# Patient Record
Sex: FEMALE | Race: WHITE | NOT HISPANIC OR LATINO | Employment: OTHER | URBAN - METROPOLITAN AREA
[De-identification: names, ages, dates, MRNs, and addresses within clinical notes are randomized per-mention and may not be internally consistent; named-entity substitution may affect disease eponyms.]

---

## 2017-11-06 ENCOUNTER — OFFICE VISIT (OUTPATIENT)
Dept: MEDICAL GROUP | Facility: PHYSICIAN GROUP | Age: 67
End: 2017-11-06
Payer: MEDICARE

## 2017-11-06 VITALS
WEIGHT: 143.08 LBS | OXYGEN SATURATION: 97 % | SYSTOLIC BLOOD PRESSURE: 124 MMHG | HEIGHT: 66 IN | TEMPERATURE: 98.4 F | HEART RATE: 58 BPM | BODY MASS INDEX: 22.99 KG/M2 | DIASTOLIC BLOOD PRESSURE: 80 MMHG

## 2017-11-06 DIAGNOSIS — Z79.891 CHRONIC USE OF OPIATE FOR THERAPEUTIC PURPOSE: ICD-10-CM

## 2017-11-06 DIAGNOSIS — G89.29 CHRONIC BILATERAL LOW BACK PAIN WITH BILATERAL SCIATICA: ICD-10-CM

## 2017-11-06 DIAGNOSIS — E89.0 POSTSURGICAL HYPOTHYROIDISM: ICD-10-CM

## 2017-11-06 DIAGNOSIS — M54.42 CHRONIC BILATERAL LOW BACK PAIN WITH BILATERAL SCIATICA: ICD-10-CM

## 2017-11-06 DIAGNOSIS — I47.10 PSVT (PAROXYSMAL SUPRAVENTRICULAR TACHYCARDIA): ICD-10-CM

## 2017-11-06 DIAGNOSIS — I49.1 PAC (PREMATURE ATRIAL CONTRACTION): ICD-10-CM

## 2017-11-06 DIAGNOSIS — F11.20 OPIATE DEPENDENCE, CONTINUOUS (HCC): ICD-10-CM

## 2017-11-06 DIAGNOSIS — Z85.850 HISTORY OF THYROID CANCER: ICD-10-CM

## 2017-11-06 DIAGNOSIS — M54.41 CHRONIC BILATERAL LOW BACK PAIN WITH BILATERAL SCIATICA: ICD-10-CM

## 2017-11-06 DIAGNOSIS — Z23 NEED FOR IMMUNIZATION AGAINST INFLUENZA: ICD-10-CM

## 2017-11-06 DIAGNOSIS — K21.9 GASTROESOPHAGEAL REFLUX DISEASE, ESOPHAGITIS PRESENCE NOT SPECIFIED: ICD-10-CM

## 2017-11-06 PROCEDURE — 99000 SPECIMEN HANDLING OFFICE-LAB: CPT | Performed by: FAMILY MEDICINE

## 2017-11-06 PROCEDURE — 99204 OFFICE O/P NEW MOD 45 MIN: CPT | Mod: 25 | Performed by: FAMILY MEDICINE

## 2017-11-06 PROCEDURE — G0008 ADMIN INFLUENZA VIRUS VAC: HCPCS | Performed by: FAMILY MEDICINE

## 2017-11-06 PROCEDURE — 90662 IIV NO PRSV INCREASED AG IM: CPT | Performed by: FAMILY MEDICINE

## 2017-11-06 RX ORDER — ATENOLOL 25 MG/1
25 TABLET ORAL DAILY
COMMUNITY
End: 2018-02-07

## 2017-11-06 RX ORDER — ASPIRIN 81 MG/1
81 TABLET, CHEWABLE ORAL
COMMUNITY
End: 2023-04-03

## 2017-11-06 RX ORDER — MULTIVIT,IRON,MINERALS/LUTEIN
1 TABLET ORAL DAILY
COMMUNITY
End: 2023-01-05

## 2017-11-06 RX ORDER — HYDROCODONE BITARTRATE AND ACETAMINOPHEN 5; 325 MG/1; MG/1
1 TABLET ORAL EVERY 8 HOURS PRN
Qty: 100 TAB | Refills: 0 | Status: SHIPPED | OUTPATIENT
Start: 2017-11-06 | End: 2018-02-06 | Stop reason: SDUPTHER

## 2017-11-06 RX ORDER — LEVOTHYROXINE SODIUM 88 UG/1
88 TABLET ORAL
COMMUNITY
End: 2017-11-27 | Stop reason: SDUPTHER

## 2017-11-06 RX ORDER — TRAZODONE HYDROCHLORIDE 50 MG/1
50 TABLET ORAL NIGHTLY
COMMUNITY
End: 2019-01-21

## 2017-11-06 RX ORDER — METOPROLOL SUCCINATE 25 MG/1
25 TABLET, EXTENDED RELEASE ORAL DAILY
COMMUNITY
End: 2017-11-27 | Stop reason: SDUPTHER

## 2017-11-06 RX ORDER — LANOLIN ALCOHOL/MO/W.PET/CERES
1000 CREAM (GRAM) TOPICAL DAILY
COMMUNITY
End: 2023-04-03

## 2017-11-06 RX ORDER — OMEPRAZOLE 40 MG/1
40 CAPSULE, DELAYED RELEASE ORAL DAILY
COMMUNITY
End: 2018-08-06 | Stop reason: SDUPTHER

## 2017-11-06 RX ORDER — HYDROCODONE BITARTRATE AND ACETAMINOPHEN 5; 325 MG/1; MG/1
1-2 TABLET ORAL EVERY 4 HOURS PRN
COMMUNITY
End: 2017-11-06 | Stop reason: SDUPTHER

## 2017-11-06 ASSESSMENT — ENCOUNTER SYMPTOMS: DEPRESSION: 0

## 2017-11-06 ASSESSMENT — LIFESTYLE VARIABLES: HISTORY_ALCOHOL_USE: 0

## 2017-11-06 ASSESSMENT — PATIENT HEALTH QUESTIONNAIRE - PHQ9: CLINICAL INTERPRETATION OF PHQ2 SCORE: 0

## 2017-11-06 NOTE — LETTER
[facility]  Cheryl Pineda M.D.  [address]  [place] [address]  Fax: [phone]   Authorization for Release/Disclosure of   Protected Health Information   Name: KRISSY JIMENEZ : 1950 SSN: [id number]   Address: [address] [place] [address] Phone:    [phone] (home)    I authorize the entity listed below to release/disclose the PHI below to:   [facility]/Cheryl Pineda M.D. and Cheryl Pineda M.D.   Provider or Entity Name:  [facility], [place], [place] Phone:      Fax:     Reason for request: continuity of care   Information to be released:    [  ] LAST COLONOSCOPY,  including any PATH REPORT and follow-up  [  ] LAST FIT/COLOGUARD RESULT [  ] LAST DEXA  [  ] LAST MAMMOGRAM  [  ] LAST PAP  [  ] LAST LABS [  ] RETINA EXAM REPORT  [  ] IMMUNIZATION RECORDS  [  ] Release all info      [  ] Check here and initial the line next to each item to release ALL health information INCLUDING  _____ Care and treatment for drug and / or alcohol abuse  _____ HIV testing, infection status, or AIDS  _____ Genetic Testing    DATES OF SERVICE OR TIME PERIOD TO BE DISCLOSED: _____________  I understand and acknowledge that:  * This Authorization may be revoked at any time by you in writing, except if your health information has already been used or disclosed.  * Your health information that will be used or disclosed as a result of you signing this authorization could be re-disclosed by the recipient. If this occurs, your re-disclosed health information may no longer be protected by State or Federal laws.  * You may refuse to sign this Authorization. Your refusal will not affect your ability to obtain treatment.  * This Authorization becomes effective upon signing and will  on (date) __________.      If no date is indicated, this Authorization will  one (1) year from the signature date.    Name: Krissy Jimenez    Signature:   Date:     2017       PLEASE  FAX REQUESTED RECORDS BACK TO: [phone]

## 2017-11-07 NOTE — PROGRESS NOTES
Subjective:      Argelia Jimenez is a 67 y.o. female who presents with Establish Care and Immunizations (flu HD)            HPI     This is a 67-year-old white female who is here as a new patient to get established. She most from the [place], [place] in July 2017.    She takes metoprolol XL for rapid heartbeat and  PACs. She said she was previously on atenolol which was switched to weeks ago to metoprolol due to shortage of the medication. Since she moved to the area, she has noticed shortness of breath and more palpitations which she attributes to the altitude. They are lasting longer than what they used to be from 20 seconds to about 3-5 minutes. She denies feeling faint or having presyncope or syncopal episode due to the rapid heartbeat. She said he can go up as high as 180 bpm.    She has history of thyroid cancer and underwent initially a left thyroid lobectomy and then later on she had right thyroid lobectomy to complete the total thyroidectomy in the 1990s. She said she has been cancer free. She takes thyroid replacement levothyroxine 88 µg 6 days a week. She said her last TSH was in June 2017.    She also has GERD for which he takes omeprazole 40 mg daily. She has been on this dose for a while and has not tried a lower dose or on as needed basis only.    She takes trazodone for insomnia 50 mg 2 times a week with good results.    She suffers from chronic low back pain with radiation to the lower extremities worse on the right than the left. She said prior MRI showed stenosis. She has tried ibuprofen in the past which has not helped. Her previous PCP in [place] put her on hydrocodone/APAP 5/325 which she takes 1-3 tablets a day and sometimes she doesn't take it at all in one day. #100 tablets last her 3 months. She said her last refill was in July 2017 and so she needs one today.    Chronic pain recheck:   Last dose of controlled substance: 3-4 days ago  Chronic pain treated with hydrocodone/APAP 5/325  one to 3 tablets a day as needed and sometimes she doesn't take it at all for one day     She  reports that she drinks alcohol.  She  reports that she does not use drugs.    Consequences of Chronic Opiate therapy:  (5 A's)  Analgesia: Compared to no treatment or prior treatment, pain is currently not changed  Activity: not changed  Adverse Events: She denies constipation, dry mouth, itchy skin, nausea and sedation  Aberrant Behaviors: She reports she is taking medication as prescribed and is not veering from agreed treatment regimen. There have been no inappropriate refills or lost/stolen meds reported.   Affect/Mood: Pain is not impacting patient's mood.  Patient reports anxiety.    Nonnarcotic treatments that are being used: NSAIDs/RICK-2-she has tried ibuprofen which did not work.   Treatment goals discussed.    Opioid Risk Score: 1    Interpretation of Opioid Risk Score   Score 0-3 = Low risk of abuse. Do UDS at least once per year.  Score 4-7 = Moderate risk of abuse. Do UDS 1-4 times per year.  Score 8+ = High risk of abuse. Refer to specialist.    Last order of CONTROLLED SUBSTANCE TREATMENT AGREEMENT was found on 11/6/2017 from Office Visit on 11/6/2017     UDS Summary                URINE DRUG SCREEN Overdue 1950         No prior UDS.    I have reviewed the medical records, the Prescription Monitoring Program and I have determined that controlled substance treatment is medically indicated.    Her last mammogram was this year and she said it came back normal. She said she does her mammogram yearly because her mother had breast cancer in her 60s. She also does her colonoscopy regularly every 5 years because of brother had colon cancer in his 40s. The last mammogram was last year and was normal. All her mammograms have been normal. Per patient she already had Prevnar 13 and Pneumovax 23 as well as tdap and shingles from her previous physician.    I reviewed the following    Past Medical History:    Diagnosis Date   • Chronic bilateral low back pain with bilateral sciatica    • Chronic use of opiate for therapeutic purpose    • GERD (gastroesophageal reflux disease)    • History of thyroid cancer    • Insomnia    • PAC (premature atrial contraction)    • Postsurgical hypothyroidism    • PSVT (paroxysmal supraventricular tachycardia) (CMS-[facility])         Past Surgical History:   Procedure Laterality Date   • APPENDECTOMY     • OTHER Left     ruptured ovarian cyst   • THYROIDECTOMY Left    • THYROIDECTOMY Right    • TONSILLECTOMY         Not on File    Current Outpatient Prescriptions   Medication Sig Dispense Refill   • omeprazole (PRILOSEC) 40 MG delayed-release capsule Take 40 mg by mouth every day.     • metoprolol SR (TOPROL XL) 25 MG TABLET SR 24 HR Take 25 mg by mouth every day.     • atenolol (TENORMIN) 25 MG Tab Take 25 mg by mouth every day.     • levothyroxine (SYNTHROID) 88 MCG Tab Take 88 mcg by mouth Every morning on an empty stomach. Takes 6 days a week     • trazodone (DESYREL) 50 MG Tab Take 50 mg by mouth every evening.     • Cyanocobalamin (VITAMIN B-12) 1000 MCG Tab Take 1,000 mcg by mouth every day.     • Omega-3 Fatty Acids (FISH OIL PO) Take  by mouth.     • Multiple Minerals-Vitamins (CALCIUM-MAGNESIUM-ZINC-D3) Tab Take  by mouth.     • BIOTIN PO Take  by mouth.     • CRANBERRY EXTRACT PO Take  by mouth.     • aspirin (ASA) 81 MG Chew Tab chewable tablet Take 81 mg by mouth every day.     • hydrocodone-acetaminophen (NORCO) 5-325 MG Tab per tablet Take 1 Tab by mouth every 8 hours as needed. 100 Tab 0     No current facility-administered medications for this visit.         Family History   Problem Relation Age of Onset   • Cancer Mother 60     breast cancer   • Heart Disease Sister      rapid heartbeat   • Cancer Brother      colon cancer   • Alcohol/Drug Brother      alcoholism   • No Known Problems Brother        Social History     Social History   • Marital status:      Spouse  "name: N/A   • Number of children: N/A   • Years of education: N/A     Occupational History   • retired dental assistant      Social History Main Topics   • Smoking status: Never Smoker   • Smokeless tobacco: Never Used   • Alcohol use Yes      Comment: 2 beers, 2-3 vodka/week   • Drug use: No   • Sexual activity: Yes     Partners: Male      Comment: 2 childen  (son and daughter)     Other Topics Concern   • Not on file     Social History Narrative   • No narrative on file          ROS     Review of Systems  Constitutional: Negative for fever, chills, weight loss and malaise/fatigue.   HEENT: Negative for ear pain, nosebleeds, congestion, sore throat and neck pain   Eyes: Negative for blurred vision.   Respiratory: Negative for cough, sputum production, shortness of breath and wheezing.    Cardiovascular: Negative for chest pain, palpitations, orthopnea and leg swelling.   Gastrointestinal: Negative for heartburn, nausea, vomiting and abdominal pain.   Genitourinary: Negative for dysuria, urgency and frequency.   Musculoskeletal: Negative for myalgias, joint pain. Positive chronic low back pain.  Skin: Negative for rash and itching.   Neurological: Negative for dizziness, tingling, tremors, sensory change, focal weakness and headaches.   Endo/Heme/Allergies: Does not bruise/bleed easily.   Psychiatric/Behavioral: Negative for depression, anxiety, or memory loss.     All other systems reviewed and are negative except as in HPI.         Objective:     /80   Pulse (!) 58   Temp 36.9 °C (98.4 °F)   Ht 1.676 m (5' 6\")   Wt 64.9 kg (143 lb 1.3 oz)   SpO2 97%   BMI 23.09 kg/m²      Physical Exam   Constitutional: She is oriented to person, place, and time. She appears well-developed and well-nourished. No distress.   HENT:   Head: Normocephalic and atraumatic.   Right Ear: External ear normal.   Left Ear: External ear normal.   Nose: Nose normal.   Mouth/Throat: Oropharynx is clear and moist. No oropharyngeal " exudate.   Eyes: Conjunctivae and EOM are normal. Pupils are equal, round, and reactive to light. Right eye exhibits no discharge. Left eye exhibits no discharge. No scleral icterus.   Neck: Normal range of motion. Neck supple. No JVD present. No tracheal deviation present. No thyromegaly present.   Cardiovascular: Normal rate, regular rhythm, normal heart sounds and intact distal pulses.  Exam reveals no gallop and no friction rub.    No murmur heard.  Pulmonary/Chest: Effort normal and breath sounds normal. No stridor. No respiratory distress. She has no wheezes. She has no rales. She exhibits no tenderness.   Abdominal: Soft. Bowel sounds are normal. She exhibits no distension and no mass. There is no tenderness. There is no rebound and no guarding. No hernia.   Musculoskeletal: Normal range of motion. She exhibits no edema, tenderness or deformity.   Back exam-no pinpoint tenderness spinous processes of the lumbosacral spine, no spasms of the paravertebral muscles lumbosacral area   Lymphadenopathy:     She has no cervical adenopathy.   Neurological: She is alert and oriented to person, place, and time. She has normal reflexes. She displays normal reflexes. No cranial nerve deficit. She exhibits normal muscle tone. Coordination normal.   Skin: Skin is warm and dry. No rash noted. She is not diaphoretic. No erythema. No pallor.   Psychiatric: She has a normal mood and affect. Her behavior is normal. Judgment and thought content normal.   Nursing note and vitals reviewed.                 Assessment/Plan:     1. PAC (premature atrial contraction)  Currently in sinus rhythm without any arrhythmias. Continue metoprolol. I will get records from previous physician.  - TSH; Future  - LIPID PROFILE; Future  - COMP METABOLIC PANEL; Future  - CBC WITH DIFFERENTIAL; Future    2. PSVT (paroxysmal supraventricular tachycardia) (CMS-HCC)  She has more episodes of lasting longer than before since she moved to the area. They  have been better since she has switched to metoprolol. We will keep her on metoprolol. I will get records from previous physician.  - TSH; Future  - LIPID PROFILE; Future  - COMP METABOLIC PANEL; Future  - CBC WITH DIFFERENTIAL; Future    3. History of thyroid cancer  Status post thyroidectomy both right and left lobes done separately. She is on thyroid replacement. Per patient she has been cancer free. I will get records from previous physician.  - TSH; Future  - LIPID PROFILE; Future  - COMP METABOLIC PANEL; Future  - CBC WITH DIFFERENTIAL; Future    4. Postsurgical hypothyroidism  We will do follow-up TSH. We will adjust the dose depending on results.  - TSH; Future  - LIPID PROFILE; Future  - COMP METABOLIC PANEL; Future  - CBC WITH DIFFERENTIAL; Future    5. Gastroesophageal reflux disease, esophagitis presence not specified  Discussed potential long-term problems with PPI especially with high-dose that she is taking. We will decrease the omeprazole 20 mg daily and after that have her take it on as needed basis.  - TSH; Future  - LIPID PROFILE; Future  - COMP METABOLIC PANEL; Future  - CBC WITH DIFFERENTIAL; Future    6. Chronic bilateral low back pain with bilateral sciatica  Per patient her MRI years ago showed spinal stenosis and abnormal curvature of the spine. Ibuprofen was tried which did not work. Controlled substance treatment agreement was signed. Urine drug screen was done today. I refilled her hydrocodone/APAP 5/325 one tablet every 8 hours as needed #100 with zero refills which should last for 3 months according to her. I will get records from previous physician.I have reviewed the medical records, the prescription monitoring program and I have determined that the controlled prescription medication is medically indicated. I have advised the patient to keep the medication in a safe place and not to drive while taking the medication.    7. Chronic use of opiate for therapeutic purpose  Plan the same  as #6.  - Controlled Substance Treatment Agreement  - hydrocodone-acetaminophen (NORCO) 5-325 MG Tab per tablet; Take 1 Tab by mouth every 8 hours as needed.  Dispense: 100 Tab; Refill: 0  - TSH; Future  - LIPID PROFILE; Future  - COMP METABOLIC PANEL; Future  - CBC WITH DIFFERENTIAL; Future  - [facility] SCREEN; Future    8. Opiate dependence, continuous (CMS-HCC)  Plan the same as #6.  - Controlled Substance Treatment Agreement  - hydrocodone-acetaminophen (NORCO) 5-325 MG Tab per tablet; Take 1 Tab by mouth every 8 hours as needed.  Dispense: 100 Tab; Refill: 0  - TSH; Future  - LIPID PROFILE; Future  - COMP METABOLIC PANEL; Future  - CBC WITH DIFFERENTIAL; Future  - [facility] SCREEN; Future    9. Need for immunization against influenza  Flu shot was given.  - INFLUENZA VACCINE, HIGH DOSE (65+ ONLY)    I will see her every 3 months. I will get records from previous physician.      Please note that this dictation was created using voice recognition software. I have worked with consultants from the vendor as well as technical experts from [facility] to optimize the interface. I have made every reasonable attempt to correct obvious errors, but I expect that there are errors of grammar and possibly content I did not discover before finalizing the note.

## 2017-11-08 ENCOUNTER — HOSPITAL ENCOUNTER (OUTPATIENT)
Dept: LAB | Facility: MEDICAL CENTER | Age: 67
End: 2017-11-08
Attending: FAMILY MEDICINE
Payer: MEDICARE

## 2017-11-08 DIAGNOSIS — K21.9 GASTROESOPHAGEAL REFLUX DISEASE, ESOPHAGITIS PRESENCE NOT SPECIFIED: ICD-10-CM

## 2017-11-08 DIAGNOSIS — I49.1 PAC (PREMATURE ATRIAL CONTRACTION): ICD-10-CM

## 2017-11-08 DIAGNOSIS — F11.20 OPIATE DEPENDENCE, CONTINUOUS (HCC): ICD-10-CM

## 2017-11-08 DIAGNOSIS — Z85.850 HISTORY OF THYROID CANCER: ICD-10-CM

## 2017-11-08 DIAGNOSIS — E89.0 POSTSURGICAL HYPOTHYROIDISM: ICD-10-CM

## 2017-11-08 DIAGNOSIS — Z79.891 CHRONIC USE OF OPIATE FOR THERAPEUTIC PURPOSE: ICD-10-CM

## 2017-11-08 DIAGNOSIS — I47.10 PSVT (PAROXYSMAL SUPRAVENTRICULAR TACHYCARDIA): ICD-10-CM

## 2017-11-08 LAB
ALBUMIN SERPL BCP-MCNC: 4.1 G/DL (ref 3.2–4.9)
ALBUMIN/GLOB SERPL: 1.6 G/DL
ALP SERPL-CCNC: 64 U/L (ref 30–99)
ALT SERPL-CCNC: 21 U/L (ref 2–50)
ANION GAP SERPL CALC-SCNC: 9 MMOL/L (ref 0–11.9)
AST SERPL-CCNC: 22 U/L (ref 12–45)
BASOPHILS # BLD AUTO: 1.1 % (ref 0–1.8)
BASOPHILS # BLD: 0.05 K/UL (ref 0–0.12)
BILIRUB SERPL-MCNC: 0.6 MG/DL (ref 0.1–1.5)
BUN SERPL-MCNC: 12 MG/DL (ref 8–22)
CALCIUM SERPL-MCNC: 9.6 MG/DL (ref 8.5–10.5)
CHLORIDE SERPL-SCNC: 102 MMOL/L (ref 96–112)
CHOLEST SERPL-MCNC: 197 MG/DL (ref 100–199)
CO2 SERPL-SCNC: 26 MMOL/L (ref 20–33)
CREAT SERPL-MCNC: 0.66 MG/DL (ref 0.5–1.4)
EOSINOPHIL # BLD AUTO: 0.12 K/UL (ref 0–0.51)
EOSINOPHIL NFR BLD: 2.7 % (ref 0–6.9)
ERYTHROCYTE [DISTWIDTH] IN BLOOD BY AUTOMATED COUNT: 48.4 FL (ref 35.9–50)
GFR SERPL CREATININE-BSD FRML MDRD: >60 ML/MIN/1.73 M 2
GLOBULIN SER CALC-MCNC: 2.5 G/DL (ref 1.9–3.5)
GLUCOSE SERPL-MCNC: 62 MG/DL (ref 65–99)
HCT VFR BLD AUTO: 44.7 % (ref 37–47)
HDLC SERPL-MCNC: 70 MG/DL
HGB BLD-MCNC: 15 G/DL (ref 12–16)
IMM GRANULOCYTES # BLD AUTO: 0.02 K/UL (ref 0–0.11)
IMM GRANULOCYTES NFR BLD AUTO: 0.4 % (ref 0–0.9)
LDLC SERPL CALC-MCNC: 111 MG/DL
LYMPHOCYTES # BLD AUTO: 1.97 K/UL (ref 1–4.8)
LYMPHOCYTES NFR BLD: 44 % (ref 22–41)
MCH RBC QN AUTO: 33.8 PG (ref 27–33)
MCHC RBC AUTO-ENTMCNC: 33.6 G/DL (ref 33.6–35)
MCV RBC AUTO: 100.7 FL (ref 81.4–97.8)
MONOCYTES # BLD AUTO: 0.59 K/UL (ref 0–0.85)
MONOCYTES NFR BLD AUTO: 13.2 % (ref 0–13.4)
NEUTROPHILS # BLD AUTO: 1.73 K/UL (ref 2–7.15)
NEUTROPHILS NFR BLD: 38.6 % (ref 44–72)
NRBC # BLD AUTO: 0 K/UL
NRBC BLD AUTO-RTO: 0 /100 WBC
PLATELET # BLD AUTO: 299 K/UL (ref 164–446)
PMV BLD AUTO: 9.6 FL (ref 9–12.9)
POTASSIUM SERPL-SCNC: 3.9 MMOL/L (ref 3.6–5.5)
PROT SERPL-MCNC: 6.6 G/DL (ref 6–8.2)
RBC # BLD AUTO: 4.44 M/UL (ref 4.2–5.4)
SODIUM SERPL-SCNC: 137 MMOL/L (ref 135–145)
TRIGL SERPL-MCNC: 80 MG/DL (ref 0–149)
TSH SERPL DL<=0.005 MIU/L-ACNC: 3.27 UIU/ML (ref 0.3–3.7)
WBC # BLD AUTO: 4.5 K/UL (ref 4.8–10.8)

## 2017-11-08 PROCEDURE — 36415 COLL VENOUS BLD VENIPUNCTURE: CPT

## 2017-11-08 PROCEDURE — 80061 LIPID PANEL: CPT

## 2017-11-08 PROCEDURE — 85025 COMPLETE CBC W/AUTO DIFF WBC: CPT

## 2017-11-08 PROCEDURE — 84443 ASSAY THYROID STIM HORMONE: CPT

## 2017-11-08 PROCEDURE — 80053 COMPREHEN METABOLIC PANEL: CPT

## 2017-11-09 ENCOUNTER — PATIENT MESSAGE (OUTPATIENT)
Dept: MEDICAL GROUP | Facility: PHYSICIAN GROUP | Age: 67
End: 2017-11-09

## 2017-11-09 DIAGNOSIS — D75.89 MACROCYTOSIS WITHOUT ANEMIA: ICD-10-CM

## 2017-11-09 DIAGNOSIS — D70.9 NEUTROPENIA, UNSPECIFIED TYPE (HCC): ICD-10-CM

## 2017-11-09 NOTE — TELEPHONE ENCOUNTER
----- Message from Cheryl Pineda M.D. sent at 11/9/2017  6:50 AM PST -----  No diabetes. Liver and kidney functions are normal.  Thyroid blood work or TSH came back normal. Continue the same dose of thyroid medication.  No anemia. Slightly low white cell count which could be due to a viral illness. The red cells are bigger than usual which could indicate deficiency and vitamins B-12 or folic acid. Alcohol use can cause this problem. Try to decrease alcohol consumption and if possible quit completely.  Cholesterol panel came back mild elevation of LDL or bad cholesterol. 10 year risk of having heart disease is at 6.0%. We treat with cholesterol medication if 7.5% or higher. We therefore don't need to put patient on cholesterol medication. Watch the fatty foods. Eat more fruits and vegetables and try to have fish 3 times a week. Exercise regularly and maintain a healthy weight.  I will recheck blood work in 1-2 months to recheck the white cell count as well as to check vitamins B-12 and folic acid level. This is a nonfasting blood work. Orders are in the computer. Patient can go to the lab to get this done in 1-2 months.    Cheryl Pineda MD

## 2017-11-09 NOTE — TELEPHONE ENCOUNTER
Phone Number Called: [phone] (home)     Message: Pt notified of results below.     Left Message for patient to call back: N\A

## 2018-01-05 ENCOUNTER — HOSPITAL ENCOUNTER (OUTPATIENT)
Dept: LAB | Facility: MEDICAL CENTER | Age: 68
End: 2018-01-05
Attending: FAMILY MEDICINE
Payer: MEDICARE

## 2018-01-05 DIAGNOSIS — D70.9 NEUTROPENIA, UNSPECIFIED TYPE (HCC): ICD-10-CM

## 2018-01-05 DIAGNOSIS — D75.89 MACROCYTOSIS WITHOUT ANEMIA: ICD-10-CM

## 2018-01-05 LAB
BASOPHILS # BLD AUTO: 1 % (ref 0–1.8)
BASOPHILS # BLD: 0.06 K/UL (ref 0–0.12)
EOSINOPHIL # BLD AUTO: 0.11 K/UL (ref 0–0.51)
EOSINOPHIL NFR BLD: 1.8 % (ref 0–6.9)
ERYTHROCYTE [DISTWIDTH] IN BLOOD BY AUTOMATED COUNT: 45.2 FL (ref 35.9–50)
FOLATE SERPL-MCNC: 12 NG/ML
HCT VFR BLD AUTO: 43.4 % (ref 37–47)
HGB BLD-MCNC: 14.9 G/DL (ref 12–16)
IMM GRANULOCYTES # BLD AUTO: 0.04 K/UL (ref 0–0.11)
IMM GRANULOCYTES NFR BLD AUTO: 0.7 % (ref 0–0.9)
LYMPHOCYTES # BLD AUTO: 1.9 K/UL (ref 1–4.8)
LYMPHOCYTES NFR BLD: 31.1 % (ref 22–41)
MCH RBC QN AUTO: 33.3 PG (ref 27–33)
MCHC RBC AUTO-ENTMCNC: 34.3 G/DL (ref 33.6–35)
MCV RBC AUTO: 97.1 FL (ref 81.4–97.8)
MONOCYTES # BLD AUTO: 0.46 K/UL (ref 0–0.85)
MONOCYTES NFR BLD AUTO: 7.5 % (ref 0–13.4)
NEUTROPHILS # BLD AUTO: 3.54 K/UL (ref 2–7.15)
NEUTROPHILS NFR BLD: 57.9 % (ref 44–72)
NRBC # BLD AUTO: 0 K/UL
NRBC BLD-RTO: 0 /100 WBC
PLATELET # BLD AUTO: 292 K/UL (ref 164–446)
PMV BLD AUTO: 9.5 FL (ref 9–12.9)
RBC # BLD AUTO: 4.47 M/UL (ref 4.2–5.4)
VIT B12 SERPL-MCNC: 1216 PG/ML (ref 211–911)
WBC # BLD AUTO: 6.1 K/UL (ref 4.8–10.8)

## 2018-01-05 PROCEDURE — 85025 COMPLETE CBC W/AUTO DIFF WBC: CPT

## 2018-01-05 PROCEDURE — 36415 COLL VENOUS BLD VENIPUNCTURE: CPT

## 2018-01-05 PROCEDURE — 82607 VITAMIN B-12: CPT

## 2018-01-05 PROCEDURE — 82746 ASSAY OF FOLIC ACID SERUM: CPT

## 2018-01-08 ENCOUNTER — TELEPHONE (OUTPATIENT)
Dept: MEDICAL GROUP | Facility: PHYSICIAN GROUP | Age: 68
End: 2018-01-08

## 2018-01-08 NOTE — TELEPHONE ENCOUNTER
----- Message from Cheryl Pineda M.D. sent at 1/7/2018  2:36 PM PST -----  Blood work came back the white cell count is now back to normal. No anemia. The size of the red cells have improved not as big as before. We don't need any further testing or workup at this time. Folic acid is normal. Vitamin B 12 is over the normal range. No need to supplement on vitamin B 12 or folic acid.

## 2018-01-08 NOTE — TELEPHONE ENCOUNTER
VOICEMAIL  1. Caller Name: Argelia Jimenez                        Call Back Number: [phone] (home)       2. Message: Called and left patient a message to call back to get lab results. LM     3. Patient approves office to leave a detailed voicemail/MyChart message: N\A

## 2018-02-05 ENCOUNTER — TELEPHONE (OUTPATIENT)
Dept: MEDICAL GROUP | Facility: PHYSICIAN GROUP | Age: 68
End: 2018-02-05

## 2018-02-05 NOTE — TELEPHONE ENCOUNTER
ESTABLISHED PATIENT PRE-VISIT PLANNING     Note: Patient will not be contacted if there is no indication to call.     1.  Reviewed notes from the last few office visits within the medical group: Yes    2.  If any orders were placed at last visit or intended to be done for this visit (i.e. 6 mos follow-up), do we have Results/Consult Notes?        •  Labs - Labs ordered, completed on 01/05/18 and results are in chart.   Note: If patient appointment is for lab review and patient did not complete labs, check with provider if OK to reschedule patient until labs completed.       •  Imaging - Imaging was not ordered at last office visit.       •  Referrals - No referrals were ordered at last office visit.    3. Is this appointment scheduled as a Hospital Follow-Up? No    4.  Immunizations were updated in [employer] using Web[employer]?: Yes       •  Web [employer] Recommendations: PREVNAR (PCV13) , TDAP and ZOSTAVAX (Shingles)    5.  Patient is due for the following Health Maintenance Topics:   Health Maintenance Due   Topic Date Due   • Annual Wellness Visit  1950   • IMM DTaP/Tdap/Td Vaccine (1 - Tdap) 08/23/1969   • PAP SMEAR  08/23/1971   • MAMMOGRAM  08/23/1990   • COLONOSCOPY  08/23/2000   • IMM ZOSTER VACCINE  08/23/2010   • BONE DENSITY  08/23/2015   • IMM PNEUMOCOCCAL 65+ (ADULT) LOW/MEDIUM RISK SERIES (1 of 2 - PCV13) 08/23/2015       - Patient has completed FLU Immunization(s) per WebIZ. Chart has been updated.    6.  Patient was NOT informed to arrive 15 min prior to their scheduled appointment and bring in their medication bottles.

## 2018-02-06 ENCOUNTER — OFFICE VISIT (OUTPATIENT)
Dept: MEDICAL GROUP | Facility: PHYSICIAN GROUP | Age: 68
End: 2018-02-06
Payer: MEDICARE

## 2018-02-06 VITALS
SYSTOLIC BLOOD PRESSURE: 118 MMHG | WEIGHT: 142.64 LBS | BODY MASS INDEX: 22.92 KG/M2 | HEIGHT: 66 IN | HEART RATE: 58 BPM | OXYGEN SATURATION: 97 % | TEMPERATURE: 98.5 F | DIASTOLIC BLOOD PRESSURE: 80 MMHG

## 2018-02-06 DIAGNOSIS — I47.10 PSVT (PAROXYSMAL SUPRAVENTRICULAR TACHYCARDIA): ICD-10-CM

## 2018-02-06 DIAGNOSIS — F11.20 OPIATE DEPENDENCE, CONTINUOUS (HCC): ICD-10-CM

## 2018-02-06 DIAGNOSIS — E89.0 POSTSURGICAL HYPOTHYROIDISM: ICD-10-CM

## 2018-02-06 DIAGNOSIS — G89.29 CHRONIC BILATERAL LOW BACK PAIN WITH BILATERAL SCIATICA: ICD-10-CM

## 2018-02-06 DIAGNOSIS — M54.42 CHRONIC BILATERAL LOW BACK PAIN WITH BILATERAL SCIATICA: ICD-10-CM

## 2018-02-06 DIAGNOSIS — I49.1 PAC (PREMATURE ATRIAL CONTRACTION): ICD-10-CM

## 2018-02-06 DIAGNOSIS — Z79.891 CHRONIC USE OF OPIATE FOR THERAPEUTIC PURPOSE: ICD-10-CM

## 2018-02-06 DIAGNOSIS — M54.41 CHRONIC BILATERAL LOW BACK PAIN WITH BILATERAL SCIATICA: ICD-10-CM

## 2018-02-06 PROCEDURE — 99215 OFFICE O/P EST HI 40 MIN: CPT | Performed by: FAMILY MEDICINE

## 2018-02-06 RX ORDER — HYDROCODONE BITARTRATE AND ACETAMINOPHEN 5; 325 MG/1; MG/1
1 TABLET ORAL EVERY 8 HOURS PRN
Qty: 100 TAB | Refills: 0 | Status: SHIPPED | OUTPATIENT
Start: 2018-02-06 | End: 2018-05-09 | Stop reason: SDUPTHER

## 2018-02-07 NOTE — PROGRESS NOTES
Subjective:      Argelia Jimenez is a 67 y.o. female who presents with Follow-Up (Chronic Back Pain)            HPI     Patient returns for follow-up of her medical problems also for chronic pain recheck.    For her chronic low back pain with radiation into the lower extremities due to stenosis as per prior MRI, she continues to take hydrocodone/APAP 5/325 one to 3 tablets per day. Sometimes she doesn't take it all day long. #100 tablets last her for 3 months. She needs refill today. Ibuprofen was tried in the past without help. Her last urine drug screen was in 11/17 that came back hydrocodone was not detected because the last dose of the medication was 3 days before. Negative for illicit drugs or recreational drugs. Last controlled substance treatment agreement was signed in 11/17.    Chronic pain recheck:   Last dose of controlled substance: 3 days ago.  Chronic pain treated with hydrocodone/APAP 5/325 one to 3 tablets per day.     She  reports that she drinks alcohol.  She  reports that she does not use drugs.    Consequences of Chronic Opiate therapy:  (5 A's)  Analgesia: Compared to no treatment or prior treatment, pain is currently not changed  Activity: not changed  Adverse Events: She denies constipation, dry mouth, itchy skin, nausea and sedation  Aberrant Behaviors: She reports she is taking medication as prescribed and is not veering from agreed treatment regimen. There have been no inappropriate refills or lost/stolen meds reported.   Affect/Mood: Pain is not impacting patient's mood.  Patient reports anxiety.    Nonnarcotic treatments that are being used: NSAIDs/RICK-2. Tried ibuprofen which did not help.  Treatment goals discussed.    Opioid Risk Score: 1    Interpretation of Opioid Risk Score   Score 0-3 = Low risk of abuse. Do UDS at least once per year.  Score 4-7 = Moderate risk of abuse. Do UDS 1-4 times per year.  Score 8+ = High risk of abuse. Refer to specialist.    Last order of CONTROLLED  "SUBSTANCE TREATMENT AGREEMENT was found on 11/6/2017 from Office Visit on 11/6/2017     UDS Summary                URINE DRUG SCREEN Next Due 11/4/2018      Done 11/9/2017 [facility] PAIN MANAGEMENT SCREEN     Patient has more history with this topic...        Most recent UDS reviewed today and is not consistent with prescribed medications. Negative for hydrocodone because the last dose was 3 days before the UDS was done.    I have reviewed the medical records, the Prescription Monitoring Program and I have determined that controlled substance treatment is medically indicated.    She continues to take thyroid replacement for hypothyroidism. Last TSH was in 11/17 that came back adequately replaced.    She has history of PACs and PSVT. Patient is on metoprolol XL which controls her problem.       Past medical history, past surgical history, family history reviewed-no changes    Social history reviewed-no changes    Allergies reviewed-no changes    Medications reviewed-no changes    ROS     Review of systems as per history of present illness, the rest are negative.       Objective:     /80   Pulse (!) 58   Temp 36.9 °C (98.5 °F)   Ht 1.676 m (5' 6\")   Wt 64.7 kg (142 lb 10.2 oz)   SpO2 97%   BMI 23.02 kg/m²      Physical Exam     Examined alert, awake, oriented, not in distress    Neck-supple, no lymphadenopathy, no thyromegaly  Lungs-clear to auscultation, no rales, no wheezes  Heart-regular rate and rhythm, no murmur  Extremities-no edema, clubbing, cyanosis  Back exam-no pinpoint tenderness spinous processes of the lumbosacral spine, no spasms of the paravertebral muscles  Neuro exam-motor strength 5/5 lower extremity flexors and extensors, DTRs 2+, sensation intact to light touch          Assessment/Plan:     1. Chronic bilateral low back pain with bilateral sciatica  I refilled her hydrocodone/APAP 5/325 to be taken one tablet every 8 hours as needed. 100 tablets last her 3 months. Consent for " occasional prescription signed. Up-to-date with UDS and controlled substance treatment agreement. Discussed avoidance of alcohol, marijuana, other recreational drugs and illicit drugs when taking opiates. I have reviewed the medical records, the prescription monitoring program and I have determined that the controlled prescription medication is medically indicated. I have advised the patient to keep the medication in a safe place and not to drive while taking the medication.  - TSH; Future  - LIPID PROFILE; Future  - COMP METABOLIC PANEL; Future  - HYDROcodone-acetaminophen (NORCO) 5-325 MG Tab per tablet; Take 1 Tab by mouth every 8 hours as needed for up to 90 days.  Dispense: 100 Tab; Refill: 0  - CONSENT FOR OPIATE PRESCRIPTION    2. Chronic use of opiate for therapeutic purpose  Same as #1.  - TSH; Future  - LIPID PROFILE; Future  - COMP METABOLIC PANEL; Future  - HYDROcodone-acetaminophen (NORCO) 5-325 MG Tab per tablet; Take 1 Tab by mouth every 8 hours as needed for up to 90 days.  Dispense: 100 Tab; Refill: 0  - CONSENT FOR OPIATE PRESCRIPTION    3. Opiate dependence, continuous (CMS-HCC)  Same as #1.  - HYDROcodone-acetaminophen (NORCO) 5-325 MG Tab per tablet; Take 1 Tab by mouth every 8 hours as needed for up to 90 days.  Dispense: 100 Tab; Refill: 0  - CONSENT FOR OPIATE PRESCRIPTION    4. Postsurgical hypothyroidism  Last TSH came back adequately replaced. We will do follow-up TSH next visit which is in 3 months.  - TSH; Future  - LIPID PROFILE; Future  - COMP METABOLIC PANEL; Future    5. PAC (premature atrial contraction)  Stable and currently in sinus rhythm. She has been doing well on metoprolol.  - TSH; Future  - LIPID PROFILE; Future  - COMP METABOLIC PANEL; Future    6. PSVT (paroxysmal supraventricular tachycardia) (CMS-HCC)  Stable and doing well on metoprolol.  - TSH; Future  - LIPID PROFILE; Future  - COMP METABOLIC PANEL; Future    40 minutes were spent on this visit. More than 50% of which  were spent going over chronic pain recheck, risks and benefits of taking opiates, going over consent for opiate prescription, going over her other medical problems, medications, plan of care, follow-up.      Please note that this dictation was created using voice recognition software. I have worked with consultants from the vendor as well as technical experts from [facility] to optimize the interface. I have made every reasonable attempt to correct obvious errors, but I expect that there are errors of grammar and possibly content I did not discover before finalizing the note.

## 2018-02-09 ENCOUNTER — PATIENT OUTREACH (OUTPATIENT)
Dept: HEALTH INFORMATION MANAGEMENT | Facility: OTHER | Age: 68
End: 2018-02-09

## 2018-02-09 NOTE — PROGRESS NOTES
Attempt #:Final    [facility] Verified: yes  Verify PCP: yes    Communication Preference Obtained: no     Review Care Team: yes    Annual Wellness Visit Scheduling  1. Scheduling Status:Not Scheduled. Patient states they are not interested      Care Gap Scheduling (Attempt to Schedule EACH Overdue Care Gap!)// Pt said that all her records are in our system.  Health Maintenance Due   Topic Date Due   • Annual Wellness Visit  1950   • PAP SMEAR  08/23/1971   • MAMMOGRAM  08/23/1990   • COLONOSCOPY  08/23/2000   • BONE DENSITY  08/23/2015     MyChart Activation: already active

## 2018-05-03 ENCOUNTER — HOSPITAL ENCOUNTER (OUTPATIENT)
Dept: LAB | Facility: MEDICAL CENTER | Age: 68
End: 2018-05-03
Attending: FAMILY MEDICINE
Payer: MEDICARE

## 2018-05-03 DIAGNOSIS — Z79.891 CHRONIC USE OF OPIATE FOR THERAPEUTIC PURPOSE: ICD-10-CM

## 2018-05-03 DIAGNOSIS — E89.0 POSTSURGICAL HYPOTHYROIDISM: ICD-10-CM

## 2018-05-03 DIAGNOSIS — I49.1 PAC (PREMATURE ATRIAL CONTRACTION): ICD-10-CM

## 2018-05-03 DIAGNOSIS — M54.41 CHRONIC BILATERAL LOW BACK PAIN WITH BILATERAL SCIATICA: ICD-10-CM

## 2018-05-03 DIAGNOSIS — M54.42 CHRONIC BILATERAL LOW BACK PAIN WITH BILATERAL SCIATICA: ICD-10-CM

## 2018-05-03 DIAGNOSIS — I47.10 PSVT (PAROXYSMAL SUPRAVENTRICULAR TACHYCARDIA): ICD-10-CM

## 2018-05-03 DIAGNOSIS — G89.29 CHRONIC BILATERAL LOW BACK PAIN WITH BILATERAL SCIATICA: ICD-10-CM

## 2018-05-03 LAB
ALBUMIN SERPL BCP-MCNC: 4.3 G/DL (ref 3.2–4.9)
ALBUMIN/GLOB SERPL: 1.6 G/DL
ALP SERPL-CCNC: 65 U/L (ref 30–99)
ALT SERPL-CCNC: 20 U/L (ref 2–50)
ANION GAP SERPL CALC-SCNC: 8 MMOL/L (ref 0–11.9)
AST SERPL-CCNC: 24 U/L (ref 12–45)
BILIRUB SERPL-MCNC: 0.8 MG/DL (ref 0.1–1.5)
BUN SERPL-MCNC: 23 MG/DL (ref 8–22)
CALCIUM SERPL-MCNC: 10 MG/DL (ref 8.5–10.5)
CHLORIDE SERPL-SCNC: 102 MMOL/L (ref 96–112)
CHOLEST SERPL-MCNC: 215 MG/DL (ref 100–199)
CO2 SERPL-SCNC: 27 MMOL/L (ref 20–33)
CREAT SERPL-MCNC: 0.78 MG/DL (ref 0.5–1.4)
GLOBULIN SER CALC-MCNC: 2.7 G/DL (ref 1.9–3.5)
GLUCOSE SERPL-MCNC: 82 MG/DL (ref 65–99)
HDLC SERPL-MCNC: 74 MG/DL
LDLC SERPL CALC-MCNC: 124 MG/DL
POTASSIUM SERPL-SCNC: 4.1 MMOL/L (ref 3.6–5.5)
PROT SERPL-MCNC: 7 G/DL (ref 6–8.2)
SODIUM SERPL-SCNC: 137 MMOL/L (ref 135–145)
TRIGL SERPL-MCNC: 84 MG/DL (ref 0–149)
TSH SERPL DL<=0.005 MIU/L-ACNC: 0.77 UIU/ML (ref 0.38–5.33)

## 2018-05-03 PROCEDURE — 80061 LIPID PANEL: CPT

## 2018-05-03 PROCEDURE — 80053 COMPREHEN METABOLIC PANEL: CPT

## 2018-05-03 PROCEDURE — 84443 ASSAY THYROID STIM HORMONE: CPT

## 2018-05-03 PROCEDURE — 36415 COLL VENOUS BLD VENIPUNCTURE: CPT

## 2018-05-09 ENCOUNTER — OFFICE VISIT (OUTPATIENT)
Dept: MEDICAL GROUP | Facility: PHYSICIAN GROUP | Age: 68
End: 2018-05-09
Payer: MEDICARE

## 2018-05-09 VITALS
WEIGHT: 140.65 LBS | HEART RATE: 55 BPM | TEMPERATURE: 98.7 F | OXYGEN SATURATION: 96 % | SYSTOLIC BLOOD PRESSURE: 130 MMHG | HEIGHT: 66 IN | DIASTOLIC BLOOD PRESSURE: 70 MMHG | BODY MASS INDEX: 22.6 KG/M2

## 2018-05-09 DIAGNOSIS — E89.0 POSTSURGICAL HYPOTHYROIDISM: ICD-10-CM

## 2018-05-09 DIAGNOSIS — R22.32 MASS OF LEFT UPPER EXTREMITY: ICD-10-CM

## 2018-05-09 DIAGNOSIS — M54.41 CHRONIC BILATERAL LOW BACK PAIN WITH BILATERAL SCIATICA: ICD-10-CM

## 2018-05-09 DIAGNOSIS — I49.1 PAC (PREMATURE ATRIAL CONTRACTION): ICD-10-CM

## 2018-05-09 DIAGNOSIS — M54.42 CHRONIC BILATERAL LOW BACK PAIN WITH BILATERAL SCIATICA: ICD-10-CM

## 2018-05-09 DIAGNOSIS — D36.7 DERMOID CYST OF LEFT LOWER EXTREMITY: ICD-10-CM

## 2018-05-09 DIAGNOSIS — I47.10 PSVT (PAROXYSMAL SUPRAVENTRICULAR TACHYCARDIA): ICD-10-CM

## 2018-05-09 DIAGNOSIS — F11.20 OPIATE DEPENDENCE, CONTINUOUS (HCC): ICD-10-CM

## 2018-05-09 DIAGNOSIS — G89.29 CHRONIC BILATERAL LOW BACK PAIN WITH BILATERAL SCIATICA: ICD-10-CM

## 2018-05-09 DIAGNOSIS — Z79.891 CHRONIC USE OF OPIATE FOR THERAPEUTIC PURPOSE: ICD-10-CM

## 2018-05-09 PROCEDURE — 99215 OFFICE O/P EST HI 40 MIN: CPT | Performed by: FAMILY MEDICINE

## 2018-05-09 RX ORDER — HYDROCODONE BITARTRATE AND ACETAMINOPHEN 5; 325 MG/1; MG/1
1 TABLET ORAL EVERY 8 HOURS PRN
Qty: 100 TAB | Refills: 0 | Status: SHIPPED | OUTPATIENT
Start: 2018-05-09 | End: 2018-12-13 | Stop reason: SDUPTHER

## 2018-05-09 RX ORDER — HYDROCODONE BITARTRATE AND ACETAMINOPHEN 5; 325 MG/1; MG/1
1-2 TABLET ORAL EVERY 4 HOURS PRN
COMMUNITY
End: 2018-07-24

## 2018-05-10 NOTE — PROGRESS NOTES
Subjective:      Argelia Jimenez is a 67 y.o. female who presents with Other (lump on lft arm) and Medication Refill (pain)            HPI     Patient returns for follow-up of her medical problems.    In terms of her chronic low back pain with radiation to the lower extremities due to spinal stenosis per MRI, patient continues to take hydrocodone/APAP 5/325 one to 3 tablets per day although sometimes she does not take any at all in one day. She gets 100 tablets in 3 months. She said she has been using less pain meds and she may have needed only 90 tablets in the last 3 months. She tried ibuprofen without help. The last urine drug screen was in 11/17 that came back hydrocodone was not detected because the last dose was 3 days prior to collection. She was negative for illicit drugs or recreational drugs. Controlled substance treatment agreement was signed in 11/17. Consent for opiate prescription was signed in 2/18.    Chronic pain recheck:   Last dose of controlled substance one week ago  Chronic pain treated with hydrocodone/APAP 10/325 taken 1-3 tablets a day as needed    She  reports that she drinks alcohol.  She  reports that she does not use drugs.    Consequences of Chronic Opiate therapy:  (5 A's)  Analgesia: Compared to no treatment or prior treatment, pain is currently not changed  Activity: not changed  Adverse Events: She denies constipation, dry mouth, itchy skin, nausea and sedation  Aberrant Behaviors: She reports she is taking medication as prescribed and is not veering from agreed treatment regimen. There have been no inappropriate refills or lost/stolen meds reported.   Affect/Mood: Pain is not impacting patient's mood.  Patient reports anxiety    Nonnarcotic treatments that are being used: Tried ibuprofen without help.   Treatment goals discussed.    Opioid Risk Score: 1    Interpretation of Opioid Risk Score   Score 0-3 = Low risk of abuse. Do UDS at least once per year.  Score 4-7 = Moderate risk of  "abuse. Do UDS 1-4 times per year.  Score 8+ = High risk of abuse. Refer to specialist.    Last order of CONTROLLED SUBSTANCE TREATMENT AGREEMENT was found on 11/6/2017 from Office Visit on 11/6/2017     UDS Summary                URINE DRUG SCREEN Next Due 11/4/2018      Done 11/9/2017 [facility] PAIN MANAGEMENT SCREEN     Patient has more history with this topic...        Most recent UDS reviewed today and was negative for hydrocodone which is the medication prescribed because she did not take the medication 3 days prior to the urine collection.    I have reviewed the medical records, the Prescription Monitoring Program and I have determined that controlled substance treatment is medically indicated.      She continues to take thyroid replacement for postsurgical hypothyroidism due to thyroid cancer diagnosed in 1990s. She has been cancer free since then.    She has PACs and PSVT for which she takes metoprolol with good results. She only has very occasional palpitations.    She has noticed a lump in the left cubital area which is nonpainful. She wants to get this removed.    Past medical history, past surgical history, family history reviewed-no changes    Social history reviewed-no changes    Allergies reviewed-no changes    Medications reviewed-no changes  ROS    As per history of present illness, the rest are negative.   Objective:     /70   Pulse (!) 55   Temp 37.1 °C (98.7 °F)   Ht 1.676 m (5' 6\")   Wt 63.8 kg (140 lb 10.5 oz)   SpO2 96%   BMI 22.70 kg/m²      Physical Exam     Examined alert, awake, oriented, not in distress    Neck-supple, no lymphadenopathy, no thyromegaly  Lungs-clear to auscultation, no rales, no wheezes  Heart-regular rate and rhythm, no murmur  Extremities-no edema, clubbing, cyanosis, 1 x 1 cm cyst medial aspect left antecubital fossa consistent with ganglion cyst  Back exam-no pinpoint tenderness spinous processes of the lumbosacral spine, no spasms of the paravertebral " muscles of the lumbosacral area  Neuro exam-motor strength 5/5 lower extremity flexors and extensors, DTRs 2+ lower extremities, sensation intact to light touch       Hospital Outpatient Visit on 05/03/2018   Component Date Value   • TSH 05/03/2018 0.770    • Cholesterol,Tot 05/03/2018 215*   • Triglycerides 05/03/2018 84    • HDL 05/03/2018 74    • LDL 05/03/2018 124*   • Sodium 05/03/2018 137    • Potassium 05/03/2018 4.1    • Chloride 05/03/2018 102    • Co2 05/03/2018 27    • Anion Gap 05/03/2018 8.0    • Glucose 05/03/2018 82    • Bun 05/03/2018 23*   • Creatinine 05/03/2018 0.78    • Calcium 05/03/2018 10.0    • AST(SGOT) 05/03/2018 24    • ALT(SGPT) 05/03/2018 20    • Alkaline Phosphatase 05/03/2018 65    • Total Bilirubin 05/03/2018 0.8    • Albumin 05/03/2018 4.3    • Total Protein 05/03/2018 7.0    • Globulin 05/03/2018 2.7    • A-G Ratio 05/03/2018 1.6    • GFR If  05/03/2018 >60    • GFR If Non  Ameri* 05/03/2018 >60         Assessment/Plan:     1. Chronic bilateral low back pain with bilateral sciatica  I refilled her hydrocodone/APAP 5/325. Up-to-date with urine drug screen, controlled substance treatment agreement and consent for opiate prescription.I have reviewed the medical records, the prescription monitoring program and I have determined that the controlled prescription medication is medically indicated. I have advised the patient to keep the medication in a safe place and not to drive while taking the medication.  - HYDROcodone-acetaminophen (NORCO) 5-325 MG Tab per tablet; Take 1 Tab by mouth every 8 hours as needed for up to 90 days.  Dispense: 100 Tab; Refill: 0    2. Chronic use of opiate for therapeutic purpose  Same as #1.  - HYDROcodone-acetaminophen (NORCO) 5-325 MG Tab per tablet; Take 1 Tab by mouth every 8 hours as needed for up to 90 days.  Dispense: 100 Tab; Refill: 0    3. Opiate dependence, continuous (HCC)  Same as #1.  - HYDROcodone-acetaminophen (NORCO)  5-325 MG Tab per tablet; Take 1 Tab by mouth every 8 hours as needed for up to 90 days.  Dispense: 100 Tab; Refill: 0    4. Postsurgical hypothyroidism  Adequately replaced. Continue same dose of thyroid medication.    5. PAC (premature atrial contraction)  Stable with very occasional episodes of palpitations. Continue metoprolol.    6. PSVT (paroxysmal supraventricular tachycardia) (HCC)  Stable on metoprolol with very occasional episodes of palpitations.    7. Mass of left upper extremity  Referral placed to Dr. Parada at [facility].  - REFERRAL TO ORTHOPEDICS    Patient was seen for 40 minutes face to face of which > 50% of appointment time was spent on counseling and coordination of care regarding the above.      Please note that this dictation was created using voice recognition software. I have worked with consultants from the vendor as well as technical experts from [facility] to optimize the interface. I have made every reasonable attempt to correct obvious errors, but I expect that there are errors of grammar and possibly content I did not discover before finalizing the note.

## 2018-07-24 ENCOUNTER — OFFICE VISIT (OUTPATIENT)
Dept: MEDICAL GROUP | Facility: PHYSICIAN GROUP | Age: 68
End: 2018-07-24
Payer: MEDICARE

## 2018-07-24 VITALS
WEIGHT: 147.27 LBS | TEMPERATURE: 98.3 F | DIASTOLIC BLOOD PRESSURE: 80 MMHG | SYSTOLIC BLOOD PRESSURE: 124 MMHG | BODY MASS INDEX: 23.67 KG/M2 | HEART RATE: 54 BPM | OXYGEN SATURATION: 96 % | HEIGHT: 66 IN

## 2018-07-24 DIAGNOSIS — Z12.39 SCREENING FOR BREAST CANCER: ICD-10-CM

## 2018-07-24 DIAGNOSIS — I49.8 BIGEMINY: ICD-10-CM

## 2018-07-24 DIAGNOSIS — R00.1 BRADYCARDIA: ICD-10-CM

## 2018-07-24 PROCEDURE — 99214 OFFICE O/P EST MOD 30 MIN: CPT | Performed by: FAMILY MEDICINE

## 2018-07-24 PROCEDURE — 93000 ELECTROCARDIOGRAM COMPLETE: CPT | Performed by: FAMILY MEDICINE

## 2018-07-25 NOTE — PROGRESS NOTES
"Subjective:      Argelia Jimenez is a 67 y.o. female who presents with Medication Management            HPI     Patient is here complaining of low heart rate in the 40s.    She said that 2 weeks ago her resting heart rate was running in the low 40s for about a day with associated fatigue but no dizziness or lightheadedness. She denied having any chest pain or shortness of breath, palpitations. Yesterday her heart rate was running low again from low to mid 40s on and off the whole day. She was also feeling fatigued at that time.    Patient has been on metoprolol XL 25 mg one tablet daily for a while now or PSVT and PACs which was diagnosed when she was still living in the [place]. She has been doing well on the medication without any palpitations.    Patient is on thyroid replacement for hypothyroidism. She is status post thyroidectomy for thyroid cancer in the 1990s. The last TSH was adequately replaced in May 2018.    Past medical history, past surgical history, family history reviewed-no changes    Social history reviewed-no changes    Allergies reviewed-no changes    Medications reviewed-no changes    ROS     As per history of present illness, the rest are negative.       Objective:     /80   Pulse (!) 54   Temp 36.8 °C (98.3 °F)   Ht 1.676 m (5' 6\")   Wt 66.8 kg (147 lb 4.3 oz)   SpO2 96%   BMI 23.77 kg/m²      Physical Exam     Examined alert, awake, oriented, not in distress    Neck-supple, no lymphadenopathy, no thyromegaly  Lungs-clear to auscultation, no rales, no wheezes  Heart-bradycardic in the high 50s, positive irregular heartbeat, no murmur  Extremities-no edema, clubbing, cyanosis       EKG in the office showed sinus rhythm rate of 57, normal axis, positive ventricular bigeminy    No visits with results within 1 Month(s) from this visit.   Latest known visit with results is:   Hospital Outpatient Visit on 05/03/2018   Component Date Value   • TSH 05/03/2018 0.770    • " Cholesterol,Tot 05/03/2018 215*   • Triglycerides 05/03/2018 84    • HDL 05/03/2018 74    • LDL 05/03/2018 124*   • Sodium 05/03/2018 137    • Potassium 05/03/2018 4.1    • Chloride 05/03/2018 102    • Co2 05/03/2018 27    • Anion Gap 05/03/2018 8.0    • Glucose 05/03/2018 82    • Bun 05/03/2018 23*   • Creatinine 05/03/2018 0.78    • Calcium 05/03/2018 10.0    • AST(SGOT) 05/03/2018 24    • ALT(SGPT) 05/03/2018 20    • Alkaline Phosphatase 05/03/2018 65    • Total Bilirubin 05/03/2018 0.8    • Albumin 05/03/2018 4.3    • Total Protein 05/03/2018 7.0    • Globulin 05/03/2018 2.7    • A-G Ratio 05/03/2018 1.6    • GFR If  05/03/2018 >60    • GFR If Non  Ameri* 05/03/2018 >60         Assessment/Plan:     1. Bradycardia  She has mild bradycardia but with ventricular bigeminy. She had 2 episodes of bradycardia in the 40s lasting for the whole day 2 weeks ago and yesterday which spontaneously resolved with fatigue is the only associated symptom. I will keep her on metoprolol XL 25 mg one tablet daily which was initially given for her PSVT and PACs. I will refer her to the cardiologist for further evaluation. ER precautions given if she has persistent bradycardia with dizziness or lightheadedness that does not spontaneously resolve especially if she has chest pain.   - EKG - Clinic Performed  - REFERRAL TO CARDIOLOGY  - MA-MAMMO SCREENING BILAT W/JUANITA W/CAD; Future    2. Bigeminy  Plan the same as #1.  - REFERRAL TO CARDIOLOGY  - MA-MAMMO SCREENING BILAT W/JUANITA W/CAD; Future    3. Screening for breast cancer  She is due for mammogram and she will set up one.      Please note that this dictation was created using voice recognition software. I have worked with consultants from the vendor as well as technical experts from [facility] to optimize the interface. I have made every reasonable attempt to correct obvious errors, but I expect that there are errors of grammar and possibly content I did not  discover before finalizing the note.

## 2018-08-06 RX ORDER — OMEPRAZOLE 40 MG/1
40 CAPSULE, DELAYED RELEASE ORAL DAILY
Qty: 90 CAP | Refills: 1 | Status: SHIPPED | OUTPATIENT
Start: 2018-08-06 | End: 2019-04-08 | Stop reason: SDUPTHER

## 2018-08-06 NOTE — TELEPHONE ENCOUNTER
----- Message from Argelia Jimenez sent at 8/6/2018 12:05 PM PDT -----  Regarding: Prescription Question  Contact: [phone]  Could you you phone in an Rx for Omeprazole 40 mg, 90 tablets, to On license of [facility], [phone]?  I am still only taking 1 tablet every other day.  Thanks

## 2018-08-27 ENCOUNTER — HOSPITAL ENCOUNTER (OUTPATIENT)
Dept: RADIOLOGY | Facility: MEDICAL CENTER | Age: 68
End: 2018-08-27
Attending: FAMILY MEDICINE
Payer: MEDICARE

## 2018-08-27 DIAGNOSIS — I49.8 BIGEMINY: ICD-10-CM

## 2018-08-27 DIAGNOSIS — R00.1 BRADYCARDIA: ICD-10-CM

## 2018-08-27 PROCEDURE — 77067 SCR MAMMO BI INCL CAD: CPT

## 2018-08-28 ENCOUNTER — HOSPITAL ENCOUNTER (OUTPATIENT)
Dept: RADIOLOGY | Facility: MEDICAL CENTER | Age: 68
End: 2018-08-28

## 2018-09-12 ENCOUNTER — NON-PROVIDER VISIT (OUTPATIENT)
Dept: MEDICAL GROUP | Facility: PHYSICIAN GROUP | Age: 68
End: 2018-09-12
Payer: MEDICARE

## 2018-09-12 DIAGNOSIS — Z23 NEED FOR IMMUNIZATION AGAINST INFLUENZA: ICD-10-CM

## 2018-09-12 DIAGNOSIS — Z23 FLU VACCINE NEED: ICD-10-CM

## 2018-09-12 PROCEDURE — 90662 IIV NO PRSV INCREASED AG IM: CPT | Performed by: FAMILY MEDICINE

## 2018-09-12 PROCEDURE — G0008 ADMIN INFLUENZA VIRUS VAC: HCPCS | Performed by: FAMILY MEDICINE

## 2018-09-12 NOTE — PROGRESS NOTES
Patient had Tdap in 2012 so I canceled this order.  She needs appointment if she needs hepatitis B immunization.  This has to be discussed with her first if this is necessary or not.

## 2018-09-12 NOTE — PROGRESS NOTES
"Argelia Jimenez is a 68 y.o. female here for a non-provider visit for:   FLU    Reason for immunization: Annual Flu Vaccine  Immunization records indicate need for vaccine: Yes, confirmed with Epic  Minimum interval has been met for this vaccine: Yes  ABN completed: Yes    Order and dose verified by: cookie RODRIGUEZ Dated  9/12/2018 was given to patient: Yes  All IAC Questionnaire questions were answered \"No.\"    Patient tolerated injection and no adverse effects were observed or reported: Yes    Pt scheduled for next dose in series: No  "

## 2018-09-13 ENCOUNTER — OFFICE VISIT (OUTPATIENT)
Dept: MEDICAL GROUP | Facility: PHYSICIAN GROUP | Age: 68
End: 2018-09-13
Payer: MEDICARE

## 2018-09-13 VITALS
HEIGHT: 66 IN | SYSTOLIC BLOOD PRESSURE: 110 MMHG | WEIGHT: 145.72 LBS | TEMPERATURE: 98.8 F | BODY MASS INDEX: 23.42 KG/M2 | OXYGEN SATURATION: 95 % | DIASTOLIC BLOOD PRESSURE: 80 MMHG | HEART RATE: 54 BPM

## 2018-09-13 DIAGNOSIS — Z11.59 NEED FOR HEPATITIS C SCREENING TEST: ICD-10-CM

## 2018-09-13 DIAGNOSIS — Z11.59 NEED FOR HEPATITIS B SCREENING TEST: ICD-10-CM

## 2018-09-13 PROCEDURE — 99212 OFFICE O/P EST SF 10 MIN: CPT | Performed by: FAMILY MEDICINE

## 2018-09-14 ENCOUNTER — HOSPITAL ENCOUNTER (OUTPATIENT)
Dept: LAB | Facility: MEDICAL CENTER | Age: 68
End: 2018-09-14
Attending: FAMILY MEDICINE
Payer: MEDICARE

## 2018-09-14 DIAGNOSIS — Z11.59 NEED FOR HEPATITIS B SCREENING TEST: ICD-10-CM

## 2018-09-14 DIAGNOSIS — Z11.59 NEED FOR HEPATITIS C SCREENING TEST: ICD-10-CM

## 2018-09-14 LAB
HBV SURFACE AB SERPL IA-ACNC: 7.36 MIU/ML (ref 0–10)
HBV SURFACE AG SER QL: NEGATIVE
HCV AB SER QL: NEGATIVE

## 2018-09-14 PROCEDURE — 86803 HEPATITIS C AB TEST: CPT

## 2018-09-14 PROCEDURE — 36415 COLL VENOUS BLD VENIPUNCTURE: CPT

## 2018-09-14 PROCEDURE — 87340 HEPATITIS B SURFACE AG IA: CPT

## 2018-09-14 PROCEDURE — 86706 HEP B SURFACE ANTIBODY: CPT

## 2018-09-14 NOTE — PROGRESS NOTES
"Subjective:      Argelia Jimenez is a 68 y.o. female who presents with Immunizations (Hep B)            HPI     Patient is here because she wants to have hepatitis B immunization.  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  They require hepatitis B vaccination documentation or titers to prove that she is immune.  She states that she already had the hepatitis B vaccine series in the 1980s at [place] but she cannot get records anymore.    No prior hepatitis C screening and she would like to be screened.  Patient was born in 1950 and so she qualifies for the screening per CDC guidelines.    Past medical history, past surgical history, family history reviewed-no changes    Social history reviewed-no changes    Allergies reviewed-no changes    Medications reviewed-no changes    ROS   As per [facility]       Objective:     /80   Pulse (!) 54   Temp 37.1 °C (98.8 °F)   Ht 1.676 m (5' 6\")   Wt 66.1 kg (145 lb 11.6 oz)   SpO2 95%   BMI 23.52 kg/m²      Physical Exam     Examined alert, awake, oriented, not in distress    Neck-supple, no lymphadenopathy, no thyromegaly  Lungs-clear to auscultation, no rales, no wheezes  Heart-regular rate and rhythm, no murmur  Extremities-no edema, clubbing, cyanosis            Assessment/Plan:     1. Need for hepatitis B screening test  We will check hepatitis surface b antibody and hepatitis B antigen.  If she is immune then she will not need to have the vaccination.  If she is not immune then she will return for the hepatitis B vaccine series.  - HEP B SURFACE ANTIGEN; Future  - HEP B SURFACE AB; Future    2. Need for hepatitis C screening test  We will screen for hepatitis C.  - HEP C VIRUS ANTIBODY; Future      Please note that this dictation was created using voice recognition software. I have worked with consultants from the vendor as well as technical experts from "[employer] to optimize the interface. I have made every reasonable attempt " to correct obvious errors, but I expect that there are errors of grammar and possibly content I did not discover before finalizing the note.

## 2018-11-28 ENCOUNTER — NON-PROVIDER VISIT (OUTPATIENT)
Dept: MEDICAL GROUP | Facility: PHYSICIAN GROUP | Age: 68
End: 2018-11-28
Payer: MEDICARE

## 2018-11-28 DIAGNOSIS — Z23 NEED FOR VACCINATION: ICD-10-CM

## 2018-11-28 PROCEDURE — 90746 HEPB VACCINE 3 DOSE ADULT IM: CPT | Performed by: FAMILY MEDICINE

## 2018-11-28 PROCEDURE — G0010 ADMIN HEPATITIS B VACCINE: HCPCS | Performed by: FAMILY MEDICINE

## 2018-11-28 NOTE — PROGRESS NOTES
"Argelia Jimenez is a 68 y.o. female here for a non-provider visit for:   HEPATITIS B 1 of 3    Reason for immunization: lack of immunity demonstrated on prior labs or testing  Immunization records indicate need for vaccine: Yes, confirmed with Epic  Minimum interval has been met for this vaccine: Yes  ABN completed: Yes    Order and dose verified by: mb  VIS Dated   was given to patient: Yes  All IAC Questionnaire questions were answered \"No.\"    Patient tolerated injection and no adverse effects were observed or reported: Yes    Pt scheduled for next dose in series: Yes  "

## 2018-12-03 ENCOUNTER — OFFICE VISIT (OUTPATIENT)
Dept: CARDIOLOGY | Facility: MEDICAL CENTER | Age: 68
End: 2018-12-03
Payer: MEDICARE

## 2018-12-03 VITALS
HEIGHT: 67 IN | HEART RATE: 50 BPM | WEIGHT: 150 LBS | OXYGEN SATURATION: 98 % | DIASTOLIC BLOOD PRESSURE: 89 MMHG | SYSTOLIC BLOOD PRESSURE: 139 MMHG | BODY MASS INDEX: 23.54 KG/M2

## 2018-12-03 DIAGNOSIS — I49.1 PAC (PREMATURE ATRIAL CONTRACTION): ICD-10-CM

## 2018-12-03 DIAGNOSIS — I47.10 SVT (SUPRAVENTRICULAR TACHYCARDIA): ICD-10-CM

## 2018-12-03 DIAGNOSIS — I47.10 PSVT (PAROXYSMAL SUPRAVENTRICULAR TACHYCARDIA): ICD-10-CM

## 2018-12-03 PROCEDURE — 99204 OFFICE O/P NEW MOD 45 MIN: CPT | Mod: 25 | Performed by: INTERNAL MEDICINE

## 2018-12-03 PROCEDURE — 93000 ELECTROCARDIOGRAM COMPLETE: CPT | Performed by: INTERNAL MEDICINE

## 2018-12-03 RX ORDER — HYDROCODONE BITARTRATE AND ACETAMINOPHEN 5; 325 MG/1; MG/1
1-2 TABLET ORAL EVERY 4 HOURS PRN
COMMUNITY
End: 2019-07-22

## 2018-12-03 ASSESSMENT — ENCOUNTER SYMPTOMS
INSOMNIA: 0
COUGH: 0
CLAUDICATION: 0
NAUSEA: 0
DEPRESSION: 0
MYALGIAS: 0
NECK PAIN: 0
FEVER: 0
WEAKNESS: 0
HEARTBURN: 0
PALPITATIONS: 1
MEMORY LOSS: 0
WEIGHT LOSS: 0
BLURRED VISION: 0
ABDOMINAL PAIN: 0
PND: 0
BRUISES/BLEEDS EASILY: 0
SHORTNESS OF BREATH: 0
LOSS OF CONSCIOUSNESS: 0
WHEEZING: 0
CHILLS: 0
DOUBLE VISION: 0
NERVOUS/ANXIOUS: 0
DIZZINESS: 0

## 2018-12-03 NOTE — PROGRESS NOTES
Chief Complaint   Patient presents with   • Bradycardia     new patient       Subjective:   Argelia Jimenez is a 68 y.o. female who presents today as a new patient in regards to bradycardia as well as concern for arrhythmia    Retired here a year and a half ago with her .  Used to be a dental hygienist.  From the [place].  Had a dora trip to [employer] cruise and went to [place].  Enjoying her life    Often walks 2-1/2 miles in the hills, that she was just adjusting to year but does find herself breathless often.  Was told she had a low heart rate.  In the past when her thyroid axis was off she had SVT.  This has been quiesced sent.  Here to make sure she does not have a problem in this situation    Past Medical History:   Diagnosis Date   • Chronic bilateral low back pain with bilateral sciatica    • Chronic use of opiate for therapeutic purpose    • GERD (gastroesophageal reflux disease)    • History of thyroid cancer    • Insomnia    • PAC (premature atrial contraction)    • Postsurgical hypothyroidism    • PSVT (paroxysmal supraventricular tachycardia) (HCC)      Past Surgical History:   Procedure Laterality Date   • COLONOSCOPY  2016    normal   • APPENDECTOMY     • OTHER Left     ruptured ovarian cyst   • THYROIDECTOMY Left    • THYROIDECTOMY Right    • TONSILLECTOMY       Family History   Problem Relation Age of Onset   • Cancer Mother 60        breast cancer   • Heart Disease Sister         rapid heartbeat   • Cancer Brother         colon cancer   • Alcohol/Drug Brother         alcoholism   • No Known Problems Brother      Social History     Social History   • Marital status:      Spouse name: N/A   • Number of children: N/A   • Years of education: N/A     Occupational History   • retired dental assistant      Social History Main Topics   • Smoking status: Never Smoker   • Smokeless tobacco: Never Used   • Alcohol use Yes      Comment: 2 beers, 2-3 vodka/week   • Drug use: No   • Sexual activity:  Yes     Partners: Male      Comment: 2 childen  (son and daughter)     Other Topics Concern   • Not on file     Social History Narrative   • No narrative on file     No Known Allergies  Outpatient Encounter Prescriptions as of 12/3/2018   Medication Sig Dispense Refill   • HYDROcodone-acetaminophen (NORCO) 5-325 MG Tab per tablet Take 1-2 Tabs by mouth every four hours as needed.     • omeprazole (PRILOSEC) 40 MG delayed-release capsule Take 1 Cap by mouth every day. (Patient taking differently: Take 40 mg by mouth every 48 hours.) 90 Cap 1   • metoprolol SR (TOPROL XL) 25 MG TABLET SR 24 HR TAKE ONE TABLET BY MOUTH ONCE DAILY 90 Tab 1   • levothyroxine (SYNTHROID) 88 MCG Tab TAKE ONE TABLET BY MOUTH IN THE MORNING 6  DAYS  A  WEEK  ON  EMPTY  STOMACH 90 Tab 1   • Cyanocobalamin (VITAMIN B-12) 1000 MCG Tab Take 1,000 mcg by mouth every day.     • Omega-3 Fatty Acids (FISH OIL PO) Take  by mouth.     • Multiple Minerals-Vitamins (CALCIUM-MAGNESIUM-ZINC-D3) Tab Take  by mouth.     • BIOTIN PO Take  by mouth.     • CRANBERRY EXTRACT PO Take  by mouth.     • aspirin (ASA) 81 MG Chew Tab chewable tablet Take 81 mg by mouth every 48 hours.     • trazodone (DESYREL) 50 MG Tab Take 50 mg by mouth every evening.       No facility-administered encounter medications on file as of 12/3/2018.      Review of Systems   Constitutional: Positive for malaise/fatigue. Negative for chills, fever and weight loss.   Eyes: Negative for blurred vision and double vision.   Respiratory: Negative for cough, shortness of breath and wheezing.    Cardiovascular: Positive for palpitations. Negative for chest pain, claudication, leg swelling and PND.   Gastrointestinal: Negative for abdominal pain, heartburn and nausea.   Musculoskeletal: Negative for myalgias and neck pain.   Skin: Negative for rash.   Neurological: Negative for dizziness, loss of consciousness and weakness.   Endo/Heme/Allergies: Negative for environmental allergies. Does not  "bruise/bleed easily.   Psychiatric/Behavioral: Negative for depression and memory loss. The patient is not nervous/anxious and does not have insomnia.    All other systems reviewed and are negative.       Objective:   /89 (BP Location: Left arm, Patient Position: Sitting)   Pulse (!) 50   Ht 1.702 m (5' 7\")   Wt 68 kg (150 lb)   SpO2 98%   BMI 23.49 kg/m²     Physical Exam   Constitutional: She is oriented to person, place, and time. She appears well-developed and well-nourished.   HENT:   Head: Normocephalic and atraumatic.   Eyes: Pupils are equal, round, and reactive to light. EOM are normal.   Neck: Neck supple. No JVD present. No thyromegaly present.   Cardiovascular: Normal rate, normal heart sounds and intact distal pulses.    No murmur heard.  Pulmonary/Chest: Breath sounds normal. No respiratory distress.   Abdominal: Bowel sounds are normal. She exhibits no distension.   Musculoskeletal: She exhibits no edema or tenderness.   Neurological: She is alert and oriented to person, place, and time. She exhibits normal muscle tone. Coordination normal.   Skin: Skin is warm and dry. No rash noted.   Psychiatric: She has a normal mood and affect. Her behavior is normal.       Assessment:     1. SVT (supraventricular tachycardia) (HCC)  EC-ECHOCARDIOGRAM COMPLETE W/O CONT    Holter Monitor Study    Treadmill Stress    EKG    CANCELED: EKG   2. PSVT (paroxysmal supraventricular tachycardia) (HCC)     3. PAC (premature atrial contraction)         Medical Decision Making:  Today's Assessment / Status / Plan:     Twelve-lead EKG done here in the office shows mild sinus bradycardia there is no evidence of a delta wave rhythm is sinus bradycardia no PVCs or PACs  Compared to the EKG done in the summer she does have some PACs at that point    Bradycardia  Talked at length about physiology, exam normal and reassuring  Did order blood work including TSH again and talked about thyroid impact on the heart    History " of SVT  Echo and baseline stress test ordered particular with her stress-induced symptoms of breathlessness  Labs as above  Holter monitor ordered talked about home monitoring  Echo results and stress test results to be discussed after follow-up arranged if warranted    Reassurance given talked about the possibility of ischemia or progression of her disease if so she will let us know

## 2018-12-04 LAB — EKG IMPRESSION: NORMAL

## 2018-12-12 DIAGNOSIS — Z79.891 CHRONIC USE OF OPIATE FOR THERAPEUTIC PURPOSE: ICD-10-CM

## 2018-12-12 DIAGNOSIS — M54.42 CHRONIC BILATERAL LOW BACK PAIN WITH BILATERAL SCIATICA: ICD-10-CM

## 2018-12-12 DIAGNOSIS — G89.29 CHRONIC BILATERAL LOW BACK PAIN WITH BILATERAL SCIATICA: ICD-10-CM

## 2018-12-12 DIAGNOSIS — F11.20 OPIATE DEPENDENCE, CONTINUOUS (HCC): ICD-10-CM

## 2018-12-12 DIAGNOSIS — M54.41 CHRONIC BILATERAL LOW BACK PAIN WITH BILATERAL SCIATICA: ICD-10-CM

## 2018-12-13 ENCOUNTER — NON-PROVIDER VISIT (OUTPATIENT)
Dept: CARDIOLOGY | Facility: MEDICAL CENTER | Age: 68
End: 2018-12-13
Payer: MEDICARE

## 2018-12-13 ENCOUNTER — OFFICE VISIT (OUTPATIENT)
Dept: MEDICAL GROUP | Facility: PHYSICIAN GROUP | Age: 68
End: 2018-12-13
Payer: MEDICARE

## 2018-12-13 VITALS
WEIGHT: 148.37 LBS | DIASTOLIC BLOOD PRESSURE: 80 MMHG | OXYGEN SATURATION: 97 % | HEART RATE: 51 BPM | SYSTOLIC BLOOD PRESSURE: 138 MMHG | HEIGHT: 67 IN | TEMPERATURE: 98 F | BODY MASS INDEX: 23.29 KG/M2

## 2018-12-13 VITALS
WEIGHT: 150 LBS | SYSTOLIC BLOOD PRESSURE: 138 MMHG | OXYGEN SATURATION: 100 % | DIASTOLIC BLOOD PRESSURE: 79 MMHG | HEIGHT: 67 IN | BODY MASS INDEX: 23.54 KG/M2 | HEART RATE: 50 BPM

## 2018-12-13 DIAGNOSIS — M54.41 CHRONIC BILATERAL LOW BACK PAIN WITH RIGHT-SIDED SCIATICA: ICD-10-CM

## 2018-12-13 DIAGNOSIS — I47.10 SVT (SUPRAVENTRICULAR TACHYCARDIA): ICD-10-CM

## 2018-12-13 DIAGNOSIS — I10 ESSENTIAL HYPERTENSION: ICD-10-CM

## 2018-12-13 DIAGNOSIS — E89.0 POSTSURGICAL HYPOTHYROIDISM: ICD-10-CM

## 2018-12-13 DIAGNOSIS — G89.29 CHRONIC BILATERAL LOW BACK PAIN WITH RIGHT-SIDED SCIATICA: ICD-10-CM

## 2018-12-13 DIAGNOSIS — I47.10 PSVT (PAROXYSMAL SUPRAVENTRICULAR TACHYCARDIA): ICD-10-CM

## 2018-12-13 DIAGNOSIS — F11.20 OPIATE DEPENDENCE, CONTINUOUS (HCC): ICD-10-CM

## 2018-12-13 DIAGNOSIS — Z79.891 CHRONIC USE OF OPIATE FOR THERAPEUTIC PURPOSE: ICD-10-CM

## 2018-12-13 LAB — TREADMILL STRESS: ABNORMAL

## 2018-12-13 PROCEDURE — 99214 OFFICE O/P EST MOD 30 MIN: CPT | Performed by: FAMILY MEDICINE

## 2018-12-13 PROCEDURE — 93015 CV STRESS TEST SUPVJ I&R: CPT | Performed by: INTERNAL MEDICINE

## 2018-12-13 RX ORDER — HYDROCODONE BITARTRATE AND ACETAMINOPHEN 5; 325 MG/1; MG/1
1 TABLET ORAL EVERY 8 HOURS PRN
Qty: 100 TAB | Refills: 0 | Status: SHIPPED | OUTPATIENT
Start: 2018-12-13 | End: 2019-07-22 | Stop reason: SDUPTHER

## 2018-12-13 ASSESSMENT — PATIENT HEALTH QUESTIONNAIRE - PHQ9: CLINICAL INTERPRETATION OF PHQ2 SCORE: 0

## 2018-12-14 ENCOUNTER — TELEPHONE (OUTPATIENT)
Dept: CARDIOLOGY | Facility: MEDICAL CENTER | Age: 68
End: 2018-12-14

## 2018-12-14 ENCOUNTER — HOSPITAL ENCOUNTER (OUTPATIENT)
Dept: CARDIOLOGY | Facility: MEDICAL CENTER | Age: 68
End: 2018-12-14
Attending: INTERNAL MEDICINE
Payer: MEDICARE

## 2018-12-14 DIAGNOSIS — I47.10 SVT (SUPRAVENTRICULAR TACHYCARDIA): ICD-10-CM

## 2018-12-14 PROCEDURE — 93306 TTE W/DOPPLER COMPLETE: CPT

## 2018-12-14 PROCEDURE — 93306 TTE W/DOPPLER COMPLETE: CPT | Mod: 26 | Performed by: INTERNAL MEDICINE

## 2018-12-14 NOTE — PROGRESS NOTES
Subjective:      Argelia Jimenez is a 68 y.o. female who presents with Back Pain      HPI:  Chronic pain recheck for: Back Pain  Last dose of controlled substance: 13:00 today  Chronic pain treated with Norco 5-325mg taken 2-3 times a day    She  reports that she drinks alcohol.  She  reports that she does not use drugs.    Interval history:   Any major change in health since last appointment? No    Consequences of Chronic Opiate therapy:  (5 A's)  Analgesia: Compared to no treatment or prior treatment, pain is currently worsened  Activity: worsened  Adverse Events:CAPHE@ denies constipation, dry mouth, itchy skin, nausea and sedation  Aberrant Behaviors: She reports she is taking medication as prescribed and is not veering from agreed treatment regimen or provider recommendations. There have been no inappropriate refills or lost/stolen meds reported.  Affect/Mood: Pain is impacting patient's mood.  Patient denies depression/anxiety.    Nonnarcotic treatments that are being used: stretching.     Last imaging: MRI 12-14 years ago    Opioid Risk Score: 1    Interpretation of Opioid Risk Score   Score 0-3 = Low risk of abuse. Do UDS at least once per year.  Score 4-7 = Moderate risk of abuse. Do UDS 1-4 times per year.  Score 8+ = High risk of abuse. Refer to specialist.    Last order of CONTROLLED SUBSTANCE TREATMENT AGREEMENT was found on 11/6/2017 from Office Visit on 11/6/2017     UDS Summary                URINE DRUG SCREEN Overdue 11/4/2018      Done 11/9/2017 [facility] PAIN MANAGEMENT SCREEN     Patient has more history with this topic...        Most recent UDS reviewed today and came back negative for hydrocodone because the last dose was 2 days before the urine drug screen was done, no illicit drugs or recreational drugs detected.  She is due for another UDS today.    I have reviewed the medical records, the Prescription Monitoring Program and I have determined that controlled substance treatment is medically  indicated.       The patient is a 68 y.o. Female who presents for a follow up on her chronic back pain due to spinal stenosis. Her last refill of [place] was in May 2018 for 100 tablets, and she has not needed more since then. She reports that over the summer she rarely needed the medicine, however for the past month her pain has changed and worsened. She states that standing from sitting is very painful, and it takes her a few minutes to be able to stand up straight and begin walking. She has been requiring 2-3 Norco 5-325mg daily because of the worsening pain. 7 weeks ago she went on vacation and had difficulty sleeping because she was unable to get comfortable due to her back pain, and since then it has not improved. She states that her back pain is slightly improved when she walks, however she can't walk normally and has to shuffle when the pain flares up. She thinks her pain worsens on days she's very active, or sits for extended periods of time. She has days where her pain is resolved, however it always returns. Her pain shoots down her right leg and into her foot. She experiences a burning numbness and tingling when the pain shoots. She denies bowel or bladder incontinence. She is requesting a refill.     She did have her first Hepatitis B vaccine, and has an appointment for the second one on 12/31/18. She has also received an influenza vaccine.     She continues to take metoprolol XL 25 mg 1 tablet daily for hypertension.  Blood pressure is still under control although higher than her baseline which she thinks may be from the pain.    She continues to take levothyroxine for postsurgical hypothyroidism due to thyroid cancer.  The last TSH was in May 2018 that came back adequately replaced.    Past medical history, past surgical history, family history reviewed-no changes    Social history reviewed-no changes    Allergies reviewed-no changes    Medications reviewed-no changes      ROS:  As per the HPI as shown  "above, the rest are negative.       Objective:     /80 (BP Location: Left arm, Patient Position: Sitting, BP Cuff Size: Adult)   Pulse (!) 51   Temp 36.7 °C (98 °F) (Temporal)   Ht 1.702 m (5' 7\")   Wt 67.3 kg (148 lb 5.9 oz)   SpO2 97%   BMI 23.24 kg/m²     Physical Exam  Examined alert, awake, oriented, not in distress    Neck-supple, no lymphadenopathy, no thyromegaly  Lungs-clear to auscultation, no rales, no wheezes  Heart-regular rate and rhythm, no murmur  Extremities-no edema, clubbing, cyanosis  Back exam there is no pinpoint tenderness spinous processes lumbosacral spine, no spasms of the paravertebral muscles lumbosacral area  Neuro exam-motor strength 5/5 lower extremity flexors and extensors, DTRs 2+, sensation intact to light touch     Assessment/Plan:   1. Chronic bilateral low back pain with right-sided sciatica  I have ordered an x-ray of the patient's back to evaluate for worsening spinal stenosis. Depending on the results we may proceed with an MRI.  I refilled her hydrocodone/APAP 5/325 today.  We updated the urine drug screen and controlled substance treatment agreement.I have reviewed the medical records, the prescription monitoring program and I have determined that the controlled prescription medication is medically indicated. I have advised the patient to keep the medication in a safe place and not to drive while taking the medication.  - DX-LUMBAR SPINE-2 OR 3 VIEWS; Future  - [facility] PAIN MANAGEMENT SCREEN; Future  - Controlled Substance Treatment Agreement  - HYDROcodone-acetaminophen (NORCO) 5-325 MG Tab per tablet; Take 1 Tab by mouth every 8 hours as needed for up to 90 days.  Dispense: 100 Tab; Refill: 0    2. Chronic use of opiate for therapeutic purpose  I have refilled the patient's Norco. I discussed the risks/benefits of taking controlled substances, and she understands. She will continue taking the medicine as prescribed. The patient had a repeat UDS today.   - " [facility] PAIN MANAGEMENT SCREEN; Future  - Controlled Substance Treatment Agreement  - HYDROcodone-acetaminophen (NORCO) 5-325 MG Tab per tablet; Take 1 Tab by mouth every 8 hours as needed for up to 90 days.  Dispense: 100 Tab; Refill: 0    3. Opiate dependence, continuous (HCC)  See problem #2.   - HYDROcodone-acetaminophen (NORCO) 5-325 MG Tab per tablet; Take 1 Tab by mouth every 8 hours as needed for up to 90 days.  Dispense: 100 Tab; Refill: 0    4. Essential hypertension  The patient's blood pressure has been mildly elevated recently due to her increase in back pain. No medication changes. Stable on metoprolol 25mg. Continue taking as prescribed.     5. Postsurgical hypothyroidism  The patient is due for a repeat TSH panel. Hypothyroidism well controlled on levothyroxine 88mcg. Continue taking as prescribed.   - TSH; Future        Giselle SWIFT (Scribe), am scribing for, and in the presence of, Cheryl Pineda MD     Electronically signed by: Giselle Feliz (Scribe), 12/13/2018    ICheryl MD personally performed the services described in this documentation, as scribed by Giselle Feliz in my presence, and it is both accurate and complete.

## 2018-12-15 NOTE — TELEPHONE ENCOUNTER
[facility] Treadmill Stress   Notes recorded by Whitney Mckenna M.D. on 12/14/2018 at 1:39 PM PST  Good news    No evidence of ischemia, great exercise tolerance  Some PVCs no SVT.  If these are bothering her we can easily change or increase her medications.  No concerns on our part     Called pt and notified, pt verbalizes understanding

## 2018-12-17 ENCOUNTER — HOSPITAL ENCOUNTER (OUTPATIENT)
Dept: RADIOLOGY | Facility: MEDICAL CENTER | Age: 68
End: 2018-12-17
Attending: FAMILY MEDICINE
Payer: MEDICARE

## 2018-12-17 ENCOUNTER — TELEPHONE (OUTPATIENT)
Dept: CARDIOLOGY | Facility: MEDICAL CENTER | Age: 68
End: 2018-12-17

## 2018-12-17 ENCOUNTER — HOSPITAL ENCOUNTER (OUTPATIENT)
Dept: LAB | Facility: MEDICAL CENTER | Age: 68
End: 2018-12-17
Attending: FAMILY MEDICINE
Payer: MEDICARE

## 2018-12-17 DIAGNOSIS — G89.29 CHRONIC BILATERAL LOW BACK PAIN WITH RIGHT-SIDED SCIATICA: ICD-10-CM

## 2018-12-17 DIAGNOSIS — M54.41 CHRONIC BILATERAL LOW BACK PAIN WITH RIGHT-SIDED SCIATICA: ICD-10-CM

## 2018-12-17 DIAGNOSIS — E89.0 POSTSURGICAL HYPOTHYROIDISM: ICD-10-CM

## 2018-12-17 DIAGNOSIS — M51.36 OTHER INTERVERTEBRAL DISC DEGENERATION, LUMBAR REGION: ICD-10-CM

## 2018-12-17 LAB
LV EJECT FRACT  99904: 55
LV EJECT FRACT MOD 2C 99903: 60.75
LV EJECT FRACT MOD 4C 99902: 49.33
LV EJECT FRACT MOD BP 99901: 54.42
TSH SERPL DL<=0.005 MIU/L-ACNC: 6.78 UIU/ML (ref 0.38–5.33)

## 2018-12-17 PROCEDURE — 36415 COLL VENOUS BLD VENIPUNCTURE: CPT

## 2018-12-17 PROCEDURE — 84443 ASSAY THYROID STIM HORMONE: CPT

## 2018-12-17 PROCEDURE — 72100 X-RAY EXAM L-S SPINE 2/3 VWS: CPT

## 2018-12-17 NOTE — TELEPHONE ENCOUNTER
----- Message -----   From: Whitney Mckenna M.D.   Sent: 12/17/2018   7:44 AM   To: Susan Oglesby R.N.     Normal echo - great news      Attempted to call pt, no answer, left vm to call back    Letter mailed to pt.

## 2018-12-17 NOTE — LETTER
December 17, 2018        Argelia Jimenez  [address] [place] [address]        Dear Argelia:    Dr Whitney Mckenna reviewed your recent Echocardiogram and she said:    Normal echocardiogram - great news     If you have any questions or concerns, please don't hesitate to call our office, [phone]        Sincerely,    Steph LUZ/Dr Whitney Mckenna

## 2018-12-19 ENCOUNTER — HOSPITAL ENCOUNTER (OUTPATIENT)
Dept: RADIOLOGY | Facility: MEDICAL CENTER | Age: 68
End: 2018-12-19
Attending: FAMILY MEDICINE
Payer: MEDICARE

## 2018-12-19 DIAGNOSIS — M54.41 CHRONIC BILATERAL LOW BACK PAIN WITH RIGHT-SIDED SCIATICA: ICD-10-CM

## 2018-12-19 DIAGNOSIS — M51.36 OTHER INTERVERTEBRAL DISC DEGENERATION, LUMBAR REGION: ICD-10-CM

## 2018-12-19 DIAGNOSIS — M48.062 SPINAL STENOSIS OF LUMBAR REGION WITH NEUROGENIC CLAUDICATION: ICD-10-CM

## 2018-12-19 DIAGNOSIS — G89.29 CHRONIC BILATERAL LOW BACK PAIN WITH RIGHT-SIDED SCIATICA: ICD-10-CM

## 2018-12-19 PROCEDURE — 72148 MRI LUMBAR SPINE W/O DYE: CPT

## 2018-12-26 DIAGNOSIS — Z23 NEED FOR HEPATITIS B VACCINATION: ICD-10-CM

## 2018-12-28 DIAGNOSIS — Z23 NEED FOR VACCINATION: ICD-10-CM

## 2018-12-31 ENCOUNTER — NON-PROVIDER VISIT (OUTPATIENT)
Dept: MEDICAL GROUP | Facility: PHYSICIAN GROUP | Age: 68
End: 2018-12-31
Payer: MEDICARE

## 2018-12-31 PROCEDURE — 90746 HEPB VACCINE 3 DOSE ADULT IM: CPT | Performed by: FAMILY MEDICINE

## 2018-12-31 PROCEDURE — G0010 ADMIN HEPATITIS B VACCINE: HCPCS | Performed by: FAMILY MEDICINE

## 2018-12-31 NOTE — PROGRESS NOTES
"Argelia Jimenez is a 68 y.o. female here for a non-provider visit for:   HEPATITIS B 2 of 2    Reason for immunization: needed for work/school  Immunization records indicate need for vaccine: Yes, confirmed with Epic  Minimum interval has been met for this vaccine: Yes  ABN completed: Yes    Order and dose verified by: IRENE  VIS Dated  10/12/18 was given to patient: Yes  All IAC Questionnaire questions were answered \"No.\"    Patient tolerated injection and no adverse effects were observed or reported: Yes    Pt scheduled for next dose in series: Yes  "

## 2019-01-09 RX ORDER — METOPROLOL SUCCINATE 25 MG/1
TABLET, EXTENDED RELEASE ORAL
Qty: 90 TAB | Refills: 1 | OUTPATIENT
Start: 2019-01-09

## 2019-01-09 RX ORDER — METOPROLOL SUCCINATE 25 MG/1
TABLET, EXTENDED RELEASE ORAL
Qty: 90 TAB | Refills: 1 | Status: SHIPPED | OUTPATIENT
Start: 2019-01-09 | End: 2019-07-08 | Stop reason: SDUPTHER

## 2019-01-09 RX ORDER — LEVOTHYROXINE SODIUM 88 UG/1
TABLET ORAL
Qty: 90 TAB | Refills: 1 | OUTPATIENT
Start: 2019-01-09

## 2019-01-09 RX ORDER — LEVOTHYROXINE SODIUM 88 UG/1
88 TABLET ORAL
Qty: 90 TAB | Refills: 1 | Status: SHIPPED | OUTPATIENT
Start: 2019-01-09 | End: 2019-07-08 | Stop reason: SDUPTHER

## 2019-01-14 ENCOUNTER — TELEPHONE (OUTPATIENT)
Dept: MEDICAL GROUP | Facility: PHYSICIAN GROUP | Age: 69
End: 2019-01-14

## 2019-01-14 ENCOUNTER — NON-PROVIDER VISIT (OUTPATIENT)
Dept: CARDIOLOGY | Facility: MEDICAL CENTER | Age: 69
End: 2019-01-14
Payer: MEDICARE

## 2019-01-14 DIAGNOSIS — R00.2 PALPITATIONS: ICD-10-CM

## 2019-01-14 PROCEDURE — 93224 XTRNL ECG REC UP TO 48 HRS: CPT | Performed by: INTERNAL MEDICINE

## 2019-01-14 NOTE — TELEPHONE ENCOUNTER
PVP WITH OUTREACH  Future Appointments       Provider Department Center    1/14/2019 3:00 PM [facility]     1/21/2019 1:00 PM Cheryl Pineda M.D.; [facility]  [facility] - [address]    7/2/2019 11:30 AM [facility] [address]          ANNUAL WELLNESS VISIT PRE-VISIT PLANNING     1.  Immunizations were updated in Epic using WebIZ?: Epic matches WebIZ       •  WebIZ Recommendations: SHINGRIX (Shingles)       •  Is patient due for Tdap? NO       •  Is patient due for Shingles? NO     2.  Specialty Comments was updated with diagnosis information provided by [facility]: NO

## 2019-01-17 ENCOUNTER — TELEPHONE (OUTPATIENT)
Dept: CARDIOLOGY | Facility: MEDICAL CENTER | Age: 69
End: 2019-01-17

## 2019-01-17 DIAGNOSIS — I47.10 SVT (SUPRAVENTRICULAR TACHYCARDIA): ICD-10-CM

## 2019-01-21 ENCOUNTER — OFFICE VISIT (OUTPATIENT)
Dept: MEDICAL GROUP | Facility: PHYSICIAN GROUP | Age: 69
End: 2019-01-21
Payer: MEDICARE

## 2019-01-21 VITALS
RESPIRATION RATE: 16 BRPM | SYSTOLIC BLOOD PRESSURE: 135 MMHG | OXYGEN SATURATION: 98 % | TEMPERATURE: 98.3 F | BODY MASS INDEX: 23.1 KG/M2 | WEIGHT: 143.74 LBS | DIASTOLIC BLOOD PRESSURE: 100 MMHG | HEART RATE: 54 BPM | HEIGHT: 66 IN

## 2019-01-21 DIAGNOSIS — Z78.0 POSTMENOPAUSAL: ICD-10-CM

## 2019-01-21 DIAGNOSIS — M54.41 CHRONIC BILATERAL LOW BACK PAIN WITH RIGHT-SIDED SCIATICA: ICD-10-CM

## 2019-01-21 DIAGNOSIS — Z00.00 MEDICARE ANNUAL WELLNESS VISIT, INITIAL: ICD-10-CM

## 2019-01-21 DIAGNOSIS — E89.0 POSTSURGICAL HYPOTHYROIDISM: ICD-10-CM

## 2019-01-21 DIAGNOSIS — I10 ESSENTIAL HYPERTENSION: ICD-10-CM

## 2019-01-21 DIAGNOSIS — F11.20 OPIOID DEPENDENCE, UNCOMPLICATED (HCC): ICD-10-CM

## 2019-01-21 DIAGNOSIS — I47.10 PSVT (PAROXYSMAL SUPRAVENTRICULAR TACHYCARDIA): ICD-10-CM

## 2019-01-21 DIAGNOSIS — G89.29 CHRONIC BILATERAL LOW BACK PAIN WITH RIGHT-SIDED SCIATICA: ICD-10-CM

## 2019-01-21 DIAGNOSIS — Z79.891 CHRONIC USE OF OPIATE FOR THERAPEUTIC PURPOSE: ICD-10-CM

## 2019-01-21 PROCEDURE — G0438 PPPS, INITIAL VISIT: HCPCS | Performed by: FAMILY MEDICINE

## 2019-01-21 ASSESSMENT — ACTIVITIES OF DAILY LIVING (ADL): BATHING_REQUIRES_ASSISTANCE: 0

## 2019-01-21 ASSESSMENT — PATIENT HEALTH QUESTIONNAIRE - PHQ9: CLINICAL INTERPRETATION OF PHQ2 SCORE: 0

## 2019-01-21 NOTE — PROGRESS NOTES
Chief Complaint   Patient presents with   • Annual Wellness Visit         HPI:  Argelia is a 68 y.o. here for Medicare Annual Wellness Visit        Patient Active Problem List    Diagnosis Date Noted   • PAC (premature atrial contraction)    • PSVT (paroxysmal supraventricular tachycardia) (HCC)    • History of thyroid cancer    • Postsurgical hypothyroidism    • GERD (gastroesophageal reflux disease)    • Insomnia    • Chronic use of opiate for therapeutic purpose        Current Outpatient Prescriptions   Medication Sig Dispense Refill   • levothyroxine (SYNTHROID) 88 MCG Tab Take 1 Tab by mouth Every morning on an empty stomach. 90 Tab 1   • metoprolol SR (TOPROL XL) 25 MG TABLET SR 24 HR TAKE 1 TABLET BY MOUTH ONCE DAILY 90 Tab 1   • HYDROcodone-acetaminophen (NORCO) 5-325 MG Tab per tablet Take 1 Tab by mouth every 8 hours as needed for up to 90 days. 100 Tab 0   • HYDROcodone-acetaminophen (NORCO) 5-325 MG Tab per tablet Take 1-2 Tabs by mouth every four hours as needed.     • omeprazole (PRILOSEC) 40 MG delayed-release capsule Take 1 Cap by mouth every day. (Patient taking differently: Take 40 mg by mouth every 48 hours.) 90 Cap 1   • Cyanocobalamin (VITAMIN B-12) 1000 MCG Tab Take 1,000 mcg by mouth every day.     • Omega-3 Fatty Acids (FISH OIL PO) Take  by mouth.     • Multiple Minerals-Vitamins (CALCIUM-MAGNESIUM-ZINC-D3) Tab Take  by mouth.     • BIOTIN PO Take  by mouth.     • CRANBERRY EXTRACT PO Take  by mouth.     • trazodone (DESYREL) 50 MG Tab Take 50 mg by mouth every evening.     • aspirin (ASA) 81 MG Chew Tab chewable tablet Take 81 mg by mouth every 48 hours.       No current facility-administered medications for this visit.         Current supplements as per medication list.     Allergies: Patient has no known allergies.    Current social contact/activities: mission projects with [employer] friends family travel.     Is patient current with immunizations? No, due for SHINGRIX (Shingles). Patient is  interested in receiving NONE today.    She  reports that she has never smoked. She has never used smokeless tobacco. She reports that she drinks alcohol. She reports that she does not use drugs.  Counseling given: Not Answered        DPA/Advanced directive: Patient has Advanced Directive on file.     ROS:    Gait: Uses no assistive device   Ostomy: No   Other tubes: No   Amputations: No   Chronic oxygen use No   Last eye exam appt 01/29/19   Wears hearing aids: No   : Reports urinary leakage during the last 6 months that has not interfered at all with their daily activities or sleep.      Screening:        Little interest or pleasure in doing things?  0 - not at all  Feeling down, depressed, or hopeless? 0 - not at all  Patient Health Questionnaire Score: 0    If depressive symptoms identified deferred to follow up visit unless specifically addressed in assessment and plan.    Interpretation of PHQ-9 Total Score   Score Severity   1-4 No Depression   5-9 Mild Depression   10-14 Moderate Depression   15-19 Moderately Severe Depression   20-27 Severe Depression    Screening for Cognitive Impairment    Three Minute Recall (leader, season, table)  3/3 Leader, season Table  Martín clock face with all 12 numbers and set the hands to show 10 past 11.  Yes Time 11:10    5/5  If cognitive concerns identified, deferred for follow up unless specifically addressed in assessment and plan.    Fall Risk Assessment    Has the patient had two or more falls in the last year or any fall with injury in the last year?  Yes  If fall risk identified, deferred for follow up unless specifically addressed in assessment and plan.    Safety Assessment    Throw rugs on floor.  Yes  Handrails on all stairs.  Yes  Good lighting in all hallways.  Yes  Difficulty hearing.  No  Patient counseled about all safety risks that were identified.    Functional Assessment ADLs    Are there any barriers preventing you from cooking for yourself or meeting  nutritional needs?  No.    Are there any barriers preventing you from driving safely or obtaining transportation?  No.    Are there any barriers preventing you from using a telephone or calling for help?  No.    Are there any barriers preventing you from shopping?  No.    Are there any barriers preventing you from taking care of your own finances?  No.    Are there any barriers preventing you from managing your medications?  No.    Are there any barriers preventing you from showering, bathing or dressing yourself?  No.    Are you currently engaging in any exercise or physical activity?  Yes.  walk  What is your perception of your health? good    Health Maintenance Summary                Annual Wellness Visit Overdue 1950     PAP SMEAR Overdue 8/23/1971     COLONOSCOPY Overdue 8/23/2000     IMM ZOSTER VACCINES Overdue 11/28/2011      Done 10/3/2011 Imm Admin: Zoster Vaccine Live (ZVL) (Zostavax)    BONE DENSITY Overdue 8/23/2015     IMM HEP B VACCINE Next Due 3/28/2019      Done 12/31/2018 Imm Admin: Hepatitis B Vaccine Recombivax (Adol/Adult)     Patient has more history with this topic...    MAMMOGRAM Next Due 8/27/2019      Done 8/27/2018 [facility]-MAMMO SCREENING BILAT W/TOMOSYNTHESIS W/CAD    URINE DRUG SCREEN Next Due 12/8/2019      Done 12/13/2018 [facility] SCREEN     Patient has more history with this topic...    IMM DTaP/Tdap/Td Vaccine Next Due 7/17/2022      Done 7/17/2012 Imm Admin: Tdap Vaccine          Patient Care Team:  Cheryl Pineda M.D. as PCP - General (Family Medicine)    Social History   Substance Use Topics   • Smoking status: Never Smoker   • Smokeless tobacco: Never Used   • Alcohol use Yes      Comment: 2 beers, 2-3 vodka/week     Family History   Problem Relation Age of Onset   • Cancer Mother 60        breast cancer   • Heart Disease Sister         rapid heartbeat   • Cancer Brother         colon cancer   • Alcohol/Drug Brother         alcoholism   • No Known Problems  "Brother      She  has a past medical history of Chronic bilateral low back pain with bilateral sciatica; Chronic use of opiate for therapeutic purpose; GERD (gastroesophageal reflux disease); History of thyroid cancer; Insomnia; PAC (premature atrial contraction); Postsurgical hypothyroidism; and PSVT (paroxysmal supraventricular tachycardia) ([facility]).   Past Surgical History:   Procedure Laterality Date   • COLONOSCOPY  2016    normal   • APPENDECTOMY     • OTHER Left     ruptured ovarian cyst   • THYROIDECTOMY Left    • THYROIDECTOMY Right    • TONSILLECTOMY             Exam:     Blood pressure 135/100, pulse (!) 54, temperature 36.8 °C (98.3 °F), temperature source Temporal, resp. rate 16, height 1.676 m (5' 6\"), weight 65.2 kg (143 lb 11.8 oz), SpO2 98 %. Body mass index is 23.2 kg/m².    Hearing good.    Dentition   Alert, oriented in no acute distress.  Eye contact is good, speech goal directed, affect calm  Skin:                 Warm, no rashes in visible areas    Head:               Atraumatic, normocephalic    Eyes:               Pupils equal, round and reactive to light, extraocular muscles movement                           intact, clear conjunctivae    Ears:               Tympanic membranes intact without evidence of infection    Nose:              No nasal discharge, no obstruction    Mouth:             No oral lesions    Throat:            Tonsils not enlarged, no exudates    Neck:              Trachea midline, supple, no lymphadenopathy, no thyromegaly    Lungs:             Clear to auscultation, no rales, no wheezes, no rhonchi    Heart:              Regular rate and rhythm, no murmur    Abdomen:       Good bowel sounds, soft, nontender, no hepatosplenomegaly, no masses    Extremities:    No edema, no clubbing, no cyanosis    Psych:            Alert and oriented x3, normal affect    Neuro:            Cranial nerves intact, Motor strength 5/5 upper and lower extremities, DTRs 2+, sensation intact to " light touch, negative Romberg    Assessment and Plan. The following treatment and monitoring plan is recommended:     1. Medicare annual wellness visit, initial  Up-to-date with all immunizations except for shingrix which is not yet available and she will get it when we have it in the office.  Last colonoscopy 2016 and she will be due in 2021 because of family history of colon cancer.  Last mammogram was in 8/18.  She is aged out for Pap smear.    2. Postmenopausal  We discussed bone density scan and we will order at next visit.    3. Chronic bilateral low back pain with right-sided sciatica  She had recent increase in her bilateral low back pain with radiation down the right leg.  X-ray of the lumbar spine in December 2018 showed multilevel DDD and grade 1 L4 anterolisthesis.  MRI showed multilevel DDD, spondylosis with central canal stenosis marked at L3-L4 level.  I have referred her to [facility] [place] and she was seen by Dr. Villagran but she was not happy with the care.  She felt that he was not sensitive to her problems and it took a while for referral for physical therapy to get set up.  She requests referral to PMR Dr. Thomas at [facility] and referral was placed.  She takes hydrocodone/APAP 5/325 very sparingly and number 100 tablets last her 3-6 months.    4. Chronic use of opiate for therapeutic purpose  She takes hydrocodone/APAP 5/325 very sparingly with 100 tablets lasting 3-6 months.  She is up-to-date with urine drug screen, controlled substance treatment agreement and informed consent.    5. Opioid dependence, uncomplicated (HCC)  Same as #4.    6. Essential hypertension  Blood pressure is under control on metoprolol.    7. Postsurgical hypothyroidism  She takes levothyroxine for hypothyroidism.  We have recently increased the dose from 1 tablet 6 days a week to 1 tablet daily because she was under replaced.  She will do follow-up TSH 8 weeks from the time we change the dose.    8. PSVT  (paroxysmal supraventricular tachycardia) (HCC)  She is followed by the cardiologist and she is on metoprolol.  She has occasional palpitations.  The most recent cardiac monitoring showed short episodes of bradycardia and PVCs.    Services suggested: No services needed at this time  Health Care Screening recommendations as per orders if indicated.  Referrals offered: PT/OT/Nutrition counseling/Behavioral Health/Smoking cessation as per orders if indicated.    Discussion today about general wellness and lifestyle habits:    · Prevent falls and reduce trip hazards; Cautioned about securing or removing rugs.  · Have a working fire alarm and carbon monoxide detector;   · Engage in regular physical activity and social activities       Follow-up: Follow-up in 6 months or sooner if needed.

## 2019-02-15 LAB — EKG IMPRESSION: NORMAL

## 2019-03-28 ENCOUNTER — HOSPITAL ENCOUNTER (OUTPATIENT)
Dept: LAB | Facility: MEDICAL CENTER | Age: 69
End: 2019-03-28
Attending: FAMILY MEDICINE
Payer: MEDICARE

## 2019-03-28 DIAGNOSIS — E89.0 POSTSURGICAL HYPOTHYROIDISM: ICD-10-CM

## 2019-03-28 LAB — TSH SERPL DL<=0.005 MIU/L-ACNC: 1.1 UIU/ML (ref 0.38–5.33)

## 2019-03-28 PROCEDURE — 36415 COLL VENOUS BLD VENIPUNCTURE: CPT

## 2019-03-28 PROCEDURE — 84443 ASSAY THYROID STIM HORMONE: CPT

## 2019-06-17 ENCOUNTER — OFFICE VISIT (OUTPATIENT)
Dept: MEDICAL GROUP | Facility: PHYSICIAN GROUP | Age: 69
End: 2019-06-17
Payer: MEDICARE

## 2019-06-17 VITALS
HEIGHT: 66 IN | WEIGHT: 148.59 LBS | SYSTOLIC BLOOD PRESSURE: 110 MMHG | TEMPERATURE: 97.8 F | BODY MASS INDEX: 23.88 KG/M2 | OXYGEN SATURATION: 96 % | DIASTOLIC BLOOD PRESSURE: 70 MMHG | HEART RATE: 65 BPM

## 2019-06-17 DIAGNOSIS — D48.5 NEOPLASM OF UNCERTAIN BEHAVIOR OF SKIN: ICD-10-CM

## 2019-06-17 DIAGNOSIS — L82.1 SEBORRHEIC KERATOSIS: ICD-10-CM

## 2019-06-17 PROCEDURE — 99213 OFFICE O/P EST LOW 20 MIN: CPT | Performed by: FAMILY MEDICINE

## 2019-06-17 RX ORDER — GABAPENTIN 300 MG/1
900 CAPSULE ORAL DAILY
COMMUNITY
End: 2021-02-25

## 2019-06-17 NOTE — PROGRESS NOTES
"Subjective:      Argelia Jimenez is a 68 y.o. female who presents with Other (sun spots)      HPI:    Patient presents today with concerns for new sun spots on her back. She states she just got back 3 days ago from a 4 week vacation in [place], and she noticed these spots during her trip. Prior to this, she was on a separate cruise/vacation for several weeks in [place]. She regularly applies sunscreen, but she is unable to reach her back on her own. She had her  apply sunscreen occasionally, but she adds he was not always around to do so when she was in sunlight. She does not have any history of skin cancer.         Past medical history, past surgical history, family history reviewed-no changes    Social history reviewed-no changes    Allergies reviewed-no changes    Medications reviewed-no changes      ROS:  As per the HPI as shown above, the rest are negative.       Objective:     /70 (BP Location: Left arm, Patient Position: Sitting, BP Cuff Size: Adult)   Pulse 65   Temp 36.6 °C (97.8 °F) (Temporal)   Ht 1.676 m (5' 6\")   Wt 67.4 kg (148 lb 9.4 oz)   SpO2 96%   BMI 23.98 kg/m²     Physical Exam    Examined alert, awake, oriented, not in distress    Skin- center of the mid-back has a 7 mm elevated, shiny, skin lesion with regular borders with different shades of dark brown to black color suspicious for skin cancer. Below this, a 5 mm irregular, rough textured, uniformly brown slightly elevated patch consistent with seborrheic keratoses.       Assessment/Plan:     1. Neoplasm of uncertain behavior of skin  Worrisome for skin cancer as melanoma. Referral placed to dermatology. Advised patient that it may take several months to get an appointment. Advised to continue applying sunscreen regularly with any sun exposure. Encouraged her to contact us if she notices any rapid changes in size or character.  She has an appointment to see me in a month and I will recheck at that time if she has not " seen a dermatologist yet.  There is significant change in the character we will do an urgent referral to dermatologist.  - REFERRAL TO DERMATOLOGY    2. Seborrheic keratosis  Smaller skin lesion below #1.  Reassured patient this is a benign problem and may not need further intervention. Since she will go to the dermatologist I asked her to get this looked at and also possibly a full skin check.        IBry (Scribe), am scribing for, and in the presence of, Cheryl Pineda MD     Electronically signed by: Bry Szymanski (Scribe), 6/17/2019    ICheryl MD personally performed the services described in this documentation, as scribed by Bry Szymanski in my presence, and it is both accurate and complete.

## 2019-07-08 RX ORDER — METOPROLOL SUCCINATE 25 MG/1
TABLET, EXTENDED RELEASE ORAL
Qty: 90 TAB | Refills: 1 | Status: SHIPPED | OUTPATIENT
Start: 2019-07-08 | End: 2020-04-03

## 2019-07-08 RX ORDER — LEVOTHYROXINE SODIUM 88 UG/1
TABLET ORAL
Qty: 90 TAB | Refills: 1 | Status: SHIPPED | OUTPATIENT
Start: 2019-07-08 | End: 2020-04-03

## 2019-07-16 ENCOUNTER — TELEPHONE (OUTPATIENT)
Dept: MEDICAL GROUP | Facility: PHYSICIAN GROUP | Age: 69
End: 2019-07-16

## 2019-07-16 NOTE — TELEPHONE ENCOUNTER
ESTABLISHED PATIENT PRE-VISIT PLANNING     Patient was NOT contacted to complete PVP.     Note: Patient will not be contacted if there is no indication to call.     1.  Reviewed notes from the last few office visits within the medical group: Yes    2.  If any orders were placed at last visit or intended to be done for this visit (i.e. 6 mos follow-up), do we have Results/Consult Notes?        •  Labs - Labs were not ordered at last office visit.   Note: If patient appointment is for lab review and patient did not complete labs, check with provider if OK to reschedule patient until labs completed.       •  Imaging - Imaging was not ordered at last office visit.       •  Referrals - Referral ordered, patient has NOT been seen.    3. Is this appointment scheduled as a Hospital Follow-Up? No    4.  Immunizations were updated in Epic using WebIZ?: Epic matches WebIZ       •  Web Iz Recommendations: HEPATITIS A , HEPATITIS B, VARICELLA (Chicken Pox)  and SHINGRIX (Shingles)    5.  Patient is due for the following Health Maintenance Topics:   Health Maintenance Due   Topic Date Due   • PAP SMEAR  08/23/1971   • COLONOSCOPY  08/23/2000   • IMM ZOSTER VACCINES (2 of 3) 11/28/2011   • BONE DENSITY  08/23/2015   • IMM HEP B VACCINE (3 of 3 - Risk 3-dose series) 04/30/2019       - Patient has completed FLU, HEPATITIS A , HEPATITIS B, PNEUMOVAX (PPSV23), PREVNAR (PCV13) , TDAP and SHINGRIX (Shingles) Immunization(s) per WebIZ. Chart has been updated.    6. Orders for overdue Health Maintenance topics pended in Pre-Charting? NO    7.  AHA (MDX) form printed for Provider? No, already completed    8.  Patient was NOT informed to arrive 15 min prior to their scheduled appointment and bring in their medication bottles.

## 2019-07-22 ENCOUNTER — OFFICE VISIT (OUTPATIENT)
Dept: MEDICAL GROUP | Facility: PHYSICIAN GROUP | Age: 69
End: 2019-07-22
Payer: MEDICARE

## 2019-07-22 VITALS
SYSTOLIC BLOOD PRESSURE: 120 MMHG | OXYGEN SATURATION: 91 % | HEART RATE: 67 BPM | WEIGHT: 148.37 LBS | HEIGHT: 66 IN | BODY MASS INDEX: 23.84 KG/M2 | TEMPERATURE: 98.5 F | DIASTOLIC BLOOD PRESSURE: 80 MMHG

## 2019-07-22 DIAGNOSIS — I47.10 PSVT (PAROXYSMAL SUPRAVENTRICULAR TACHYCARDIA): ICD-10-CM

## 2019-07-22 DIAGNOSIS — M54.41 CHRONIC BILATERAL LOW BACK PAIN WITH RIGHT-SIDED SCIATICA: ICD-10-CM

## 2019-07-22 DIAGNOSIS — E89.0 POSTSURGICAL HYPOTHYROIDISM: ICD-10-CM

## 2019-07-22 DIAGNOSIS — F11.20 OPIATE DEPENDENCE, CONTINUOUS (HCC): ICD-10-CM

## 2019-07-22 DIAGNOSIS — E78.5 DYSLIPIDEMIA: ICD-10-CM

## 2019-07-22 DIAGNOSIS — Z23 NEED FOR HEPATITIS A VACCINATION: ICD-10-CM

## 2019-07-22 DIAGNOSIS — Z79.891 CHRONIC USE OF OPIATE FOR THERAPEUTIC PURPOSE: ICD-10-CM

## 2019-07-22 DIAGNOSIS — I10 ESSENTIAL HYPERTENSION: ICD-10-CM

## 2019-07-22 DIAGNOSIS — Z23 NEED FOR HEPATITIS B VACCINATION: ICD-10-CM

## 2019-07-22 DIAGNOSIS — G89.29 CHRONIC BILATERAL LOW BACK PAIN WITH RIGHT-SIDED SCIATICA: ICD-10-CM

## 2019-07-22 PROCEDURE — 99214 OFFICE O/P EST MOD 30 MIN: CPT | Mod: 25 | Performed by: FAMILY MEDICINE

## 2019-07-22 PROCEDURE — 90632 HEPA VACCINE ADULT IM: CPT | Performed by: FAMILY MEDICINE

## 2019-07-22 PROCEDURE — G0010 ADMIN HEPATITIS B VACCINE: HCPCS | Performed by: FAMILY MEDICINE

## 2019-07-22 PROCEDURE — 90472 IMMUNIZATION ADMIN EACH ADD: CPT | Performed by: FAMILY MEDICINE

## 2019-07-22 PROCEDURE — 90746 HEPB VACCINE 3 DOSE ADULT IM: CPT | Performed by: FAMILY MEDICINE

## 2019-07-22 RX ORDER — HYDROCODONE BITARTRATE AND ACETAMINOPHEN 5; 325 MG/1; MG/1
1 TABLET ORAL EVERY 8 HOURS PRN
Qty: 100 TAB | Refills: 0 | Status: SHIPPED | OUTPATIENT
Start: 2019-07-22 | End: 2020-04-22 | Stop reason: SDUPTHER

## 2019-07-22 NOTE — PROGRESS NOTES
Subjective:      Argelia Jimenez is a 68 y.o. female who presents with Pain (chronic) and Immunizations (3rd hep B)      HPI:    Patient presents today for follow-up of her chronic medical problems including a chronic pain recheck.    Patient has a history of chronic bilateral low back pain with right-sided sciatica. X-ray of the lumbar spine in December 2018 showed multilevel DDD and grade 1 L4 anterolisthesis.  MRI showed multilevel DDD, spondylosis with central canal stenosis marked at L3-L4 level. She previously saw Dr. Villagran, but she was unhappy with her care. She requested a referral to PMR Dr. Thomas that I placed on her last follow-up visit in January. She currently takes Hydrocodone/APAP 5-325 mg very sparingly with good control of her pain; 100 tablets will typically last her 3-6 months. She has since followed-up with Dr. Thomas, and she is very pleased with her treatment so far. He prescribed her Meloxicam 15 mg QD and Gabapentin 300 mg TID. Due to this, she states her pain is greatly improved, and she is taking less Norco than before. She was previously taking 8-10 a week, but she is now only taking 3-4 a week. She is also undergoing physical therapy. They have not yet done injections yet based on her improved pain. She is UTD with her UDS, controlled substance agreement and informed consent.    Chronic pain recheck:  Last dose of controlled substance: 3 days ago  Chronic pain treated with Hydrocodone/APAP 5-325 mg taken very sparingly with 100 tablets last 3-6 months.    She  reports that she drinks alcohol.  She  reports that she does not use drugs.    Interval history:   Any major change in health since last appointment? No    Consequences of Chronic Opiate therapy:  (5 A's)  Analgesia: Compared to no treatment or prior treatment, pain is currently significantly improved  Activity: significantly improved  Adverse Events: She denies constipation, dry mouth, itchy skin, nausea and  sedation  Aberrant Behaviors: She reports she is taking medication as prescribed and is not veering from agreed treatment regimen or provider recommendations. There have been no inappropriate refills or lost/stolen meds reported.  Affect/Mood: Pain is not impacting patient's mood.  Patient denies depression/anxiety.    Nonnarcotic treatments that are being used: NSAIDs/RICK-2, Gabapentin and physical therapy.     Last imaging: MRI lumbar spine 12/2018 and DX-lumbar spine 12/2018    Opioid Risk Score: 1    Interpretation of Opioid Risk Score   Score 0-3 = Low risk of abuse. Do UDS at least once per year.  Score 4-7 = Moderate risk of abuse. Do UDS 1-4 times per year.  Score 8+ = High risk of abuse. Refer to specialist.    Last order of CONTROLLED SUBSTANCE TREATMENT AGREEMENT was found on 12/13/2018 from Office Visit on 12/13/2018     UDS Summary                URINE DRUG SCREEN Next Due 12/8/2019      Done 12/13/2018 Molecular Detection PAIN MANAGEMENT SCREEN     Patient has more history with this topic...        Most recent UDS reviewed today and is consistent with prescribed medications.     I have reviewed the medical records, the Prescription Monitoring Program and I have determined that controlled substance treatment is medically indicated.       Essential Hypertension  She takes Metoprolol SR 25 mg for her hypertension without any side effects. Blood pressure levels in the office today are within goal.     PSVT   She states she very occasionally has palpitations, but there has not been any increase in episodes. She is currently taking Metoprolol 25 mg as well. She is followed by cardiology.     Post-surgical hypothyroidism  Patient continues to take thyroid replacement medication as prescribed.  She has history of thyroid cancer status post total thyroidectomy over 20 years ago.    Dyslipidemia  She continues to manage her dyslipidemia through her own efforts.     Neoplasm of uncertain behavior of skin  I previously saw  "the patient last month for two skin abnormalities which she first noticed after her recent overseas travel. She has an appointment with dermatology scheduled in 3 weeks, but she would like me to reevaluate these spots again.        Past medical history, past surgical history, family history reviewed-no changes    Social history reviewed-no changes    Allergies reviewed-no changes    Medications reviewed-no changes      ROS:  As per the HPI as shown above, the rest are negative.       Objective:     /80 (BP Location: Left arm, Patient Position: Sitting, BP Cuff Size: Adult)   Pulse 67   Temp 36.9 °C (98.5 °F) (Temporal)   Ht 1.676 m (5' 6\")   Wt 67.3 kg (148 lb 5.9 oz)   SpO2 91%   BMI 23.95 kg/m²     Physical Exam    Examined alert, awake, oriented, not in distress    Neck-supple, no lymphadenopathy, no thyromegaly  Lungs-clear to auscultation, no rales, no wheezes  Heart-regular rate and rhythm, no murmur  Extremities-no edema, clubbing, cyanosis  Back exam-no pinpoint tenderness spinous processes lumbosacral spine No spasm paraspinous muscles lumbosacral region  Neuro exam-motor strength 5/5 lower extremity flexors and extensors, DTRs 2+ lower extremities, sensation intact to light touch  Skin-unchanged from prior. center of the mid-back has a 7 mm elevated, shiny, skin lesion with regular borders with different shades of dark brown to black color suspicious for skin cancer. Below this, a 5 mm irregular, rough textured, uniformly brown slightly elevated patch consistent with seborrheic keratoses.       Assessment/Plan:     1. Chronic bilateral low back pain with right-sided sciatica  Pain is well controlled on the current dosage of pain medication. It is even greatly improved since following up with PMR who started her on Gabapentin and Meloxicam. Patient provided with a prescription of 100 tablets of Norco 5/325 mg. I have reviewed the medical records and the prescription monitoring program. I have " determined that the controlled prescription medication is medically indicated. I have advised the patient to keep the medication in a safe place and not to drive while taking the medication. [facility] urine drug screen, controlled substance agreement, and informed consent are all up to date. She will follow-up in 6 months for refills of her medication.  - HYDROcodone-acetaminophen (NORCO) 5-325 MG Tab per tablet; Take 1 Tab by mouth every 8 hours as needed for up to 90 days.  Dispense: 100 Tab; Refill: 0  - Lipid Profile; Future  - Comp Metabolic Panel; Future  - CBC WITH DIFFERENTIAL; Future  - TSH; Future    2. Opiate dependence, continuous (HCC)  Same as #1.  - HYDROcodone-acetaminophen (NORCO) 5-325 MG Tab per tablet; Take 1 Tab by mouth every 8 hours as needed for up to 90 days.  Dispense: 100 Tab; Refill: 0  - Lipid Profile; Future  - Comp Metabolic Panel; Future  - CBC WITH DIFFERENTIAL; Future  - TSH; Future    3. Chronic use of opiate for therapeutic purpose  Same as #1.  - HYDROcodone-acetaminophen (NORCO) 5-325 MG Tab per tablet; Take 1 Tab by mouth every 8 hours as needed for up to 90 days.  Dispense: 100 Tab; Refill: 0  - Lipid Profile; Future  - Comp Metabolic Panel; Future  - CBC WITH DIFFERENTIAL; Future  - TSH; Future    4. Essential hypertension  Blood pressure is stable and within goal on Metoprolol 25 mg. We will continue the current dosages. She will continue follow-up with her cardiologist, Dr. Mckenna. I have advised her to avoid salty foods and exercise regularly.  - Lipid Profile; Future  - Comp Metabolic Panel; Future  - CBC WITH DIFFERENTIAL; Future  - TSH; Future    5. PSVT (paroxysmal supraventricular tachycardia) (HCC)  Her palpitations are infrequent and unchanged. Stable and under good control on Metoprolol. The most recent cardiac monitoring showed short episodes of bradycardia and PVCs. She will continue to follow-up with Dr. Mckenna (cardiology).  - Lipid Profile; Future  - Comp  Metabolic Panel; Future  - CBC WITH DIFFERENTIAL; Future  - TSH; Future    6. Postsurgical hypothyroidism  Stable on thyroid replacement medications. Her last TSH was adequately replaced at 1.100. We will continue the current plan of care. Labs have been ordered for the next follow up visit.  - Lipid Profile; Future  - Comp Metabolic Panel; Future  - CBC WITH DIFFERENTIAL; Future  - TSH; Future    7. Dyslipidemia  Her last lipid panel in May 2018 showed elevated total cholesterol and LDL. She will continue to monitor this through her own efforts. Labs have been ordered for the next follow up visit.  We will treat depending on 10-year ASCVD risk.  - Lipid Profile; Future  - Comp Metabolic Panel; Future  - CBC WITH DIFFERENTIAL; Future  - TSH; Future    8. Need for hepatitis B vaccination  She needs to complete her third and last dose of hepatitis B vaccination series.  Vaccination completed.  - Hepatitis B Vaccine Adult IM    9. Need for hepatitis A vaccination  She needs to complete the second and last dose of the hepatitis A.  Vaccination completed.  - Hepatitis A Vaccine Adult IM         Bry SWIFT (C[place]y), am scribing for, and in the presence of, Cheryl Pineda MD     Electronically signed by: Bry Szymanski (Scribe), 7/22/2019    Cheryl SWIFT MD personally performed the services described in this documentation, as scribed by Bry Szymanski in my presence, and it is both accurate and complete.

## 2019-07-25 ENCOUNTER — HOSPITAL ENCOUNTER (OUTPATIENT)
Dept: LAB | Facility: MEDICAL CENTER | Age: 69
End: 2019-07-25
Attending: FAMILY MEDICINE
Payer: MEDICARE

## 2019-07-25 DIAGNOSIS — G89.29 CHRONIC BILATERAL LOW BACK PAIN WITH RIGHT-SIDED SCIATICA: ICD-10-CM

## 2019-07-25 DIAGNOSIS — Z79.891 CHRONIC USE OF OPIATE FOR THERAPEUTIC PURPOSE: ICD-10-CM

## 2019-07-25 DIAGNOSIS — M54.41 CHRONIC BILATERAL LOW BACK PAIN WITH RIGHT-SIDED SCIATICA: ICD-10-CM

## 2019-07-25 DIAGNOSIS — E78.5 DYSLIPIDEMIA: ICD-10-CM

## 2019-07-25 DIAGNOSIS — F11.20 OPIATE DEPENDENCE, CONTINUOUS (HCC): ICD-10-CM

## 2019-07-25 DIAGNOSIS — I47.10 PSVT (PAROXYSMAL SUPRAVENTRICULAR TACHYCARDIA): ICD-10-CM

## 2019-07-25 DIAGNOSIS — I10 ESSENTIAL HYPERTENSION: ICD-10-CM

## 2019-07-25 DIAGNOSIS — E89.0 POSTSURGICAL HYPOTHYROIDISM: ICD-10-CM

## 2019-07-25 LAB
ALBUMIN SERPL BCP-MCNC: 4.1 G/DL (ref 3.2–4.9)
ALBUMIN/GLOB SERPL: 1.5 G/DL
ALP SERPL-CCNC: 55 U/L (ref 30–99)
ALT SERPL-CCNC: 18 U/L (ref 2–50)
ANION GAP SERPL CALC-SCNC: 8 MMOL/L (ref 0–11.9)
AST SERPL-CCNC: 21 U/L (ref 12–45)
BASOPHILS # BLD AUTO: 1.2 % (ref 0–1.8)
BASOPHILS # BLD: 0.05 K/UL (ref 0–0.12)
BILIRUB SERPL-MCNC: 0.5 MG/DL (ref 0.1–1.5)
BUN SERPL-MCNC: 18 MG/DL (ref 8–22)
CALCIUM SERPL-MCNC: 9.7 MG/DL (ref 8.5–10.5)
CHLORIDE SERPL-SCNC: 104 MMOL/L (ref 96–112)
CHOLEST SERPL-MCNC: 203 MG/DL (ref 100–199)
CO2 SERPL-SCNC: 25 MMOL/L (ref 20–33)
CREAT SERPL-MCNC: 0.99 MG/DL (ref 0.5–1.4)
EOSINOPHIL # BLD AUTO: 0.15 K/UL (ref 0–0.51)
EOSINOPHIL NFR BLD: 3.7 % (ref 0–6.9)
ERYTHROCYTE [DISTWIDTH] IN BLOOD BY AUTOMATED COUNT: 48.7 FL (ref 35.9–50)
FASTING STATUS PATIENT QL REPORTED: NORMAL
GLOBULIN SER CALC-MCNC: 2.7 G/DL (ref 1.9–3.5)
GLUCOSE SERPL-MCNC: 90 MG/DL (ref 65–99)
HCT VFR BLD AUTO: 39.9 % (ref 37–47)
HDLC SERPL-MCNC: 64 MG/DL
HGB BLD-MCNC: 13.2 G/DL (ref 12–16)
IMM GRANULOCYTES # BLD AUTO: 0.02 K/UL (ref 0–0.11)
IMM GRANULOCYTES NFR BLD AUTO: 0.5 % (ref 0–0.9)
LDLC SERPL CALC-MCNC: 119 MG/DL
LYMPHOCYTES # BLD AUTO: 1.67 K/UL (ref 1–4.8)
LYMPHOCYTES NFR BLD: 41.4 % (ref 22–41)
MCH RBC QN AUTO: 34.1 PG (ref 27–33)
MCHC RBC AUTO-ENTMCNC: 33.1 G/DL (ref 33.6–35)
MCV RBC AUTO: 103.1 FL (ref 81.4–97.8)
MONOCYTES # BLD AUTO: 0.47 K/UL (ref 0–0.85)
MONOCYTES NFR BLD AUTO: 11.7 % (ref 0–13.4)
NEUTROPHILS # BLD AUTO: 1.67 K/UL (ref 2–7.15)
NEUTROPHILS NFR BLD: 41.5 % (ref 44–72)
NRBC # BLD AUTO: 0 K/UL
NRBC BLD-RTO: 0 /100 WBC
PLATELET # BLD AUTO: 317 K/UL (ref 164–446)
PMV BLD AUTO: 9.8 FL (ref 9–12.9)
POTASSIUM SERPL-SCNC: 4.2 MMOL/L (ref 3.6–5.5)
PROT SERPL-MCNC: 6.8 G/DL (ref 6–8.2)
RBC # BLD AUTO: 3.87 M/UL (ref 4.2–5.4)
SODIUM SERPL-SCNC: 137 MMOL/L (ref 135–145)
TRIGL SERPL-MCNC: 98 MG/DL (ref 0–149)
TSH SERPL DL<=0.005 MIU/L-ACNC: 0.46 UIU/ML (ref 0.38–5.33)
WBC # BLD AUTO: 4 K/UL (ref 4.8–10.8)

## 2019-07-25 PROCEDURE — 84443 ASSAY THYROID STIM HORMONE: CPT

## 2019-07-25 PROCEDURE — 80053 COMPREHEN METABOLIC PANEL: CPT

## 2019-07-25 PROCEDURE — 36415 COLL VENOUS BLD VENIPUNCTURE: CPT

## 2019-07-25 PROCEDURE — 80061 LIPID PANEL: CPT

## 2019-07-25 PROCEDURE — 85025 COMPLETE CBC W/AUTO DIFF WBC: CPT

## 2019-07-26 DIAGNOSIS — E78.5 DYSLIPIDEMIA: ICD-10-CM

## 2019-07-26 DIAGNOSIS — D70.9 NEUTROPENIA, UNSPECIFIED TYPE (HCC): ICD-10-CM

## 2019-08-28 ENCOUNTER — APPOINTMENT (RX ONLY)
Dept: URBAN - METROPOLITAN AREA CLINIC 20 | Facility: CLINIC | Age: 69
Setting detail: DERMATOLOGY
End: 2019-08-28

## 2019-08-28 DIAGNOSIS — L57.8 OTHER SKIN CHANGES DUE TO CHRONIC EXPOSURE TO NONIONIZING RADIATION: ICD-10-CM

## 2019-08-28 DIAGNOSIS — D17 BENIGN LIPOMATOUS NEOPLASM: ICD-10-CM

## 2019-08-28 DIAGNOSIS — L81.4 OTHER MELANIN HYPERPIGMENTATION: ICD-10-CM

## 2019-08-28 DIAGNOSIS — L82.1 OTHER SEBORRHEIC KERATOSIS: ICD-10-CM

## 2019-08-28 DIAGNOSIS — D18.0 HEMANGIOMA: ICD-10-CM

## 2019-08-28 DIAGNOSIS — D22 MELANOCYTIC NEVI: ICD-10-CM

## 2019-08-28 PROBLEM — D48.5 NEOPLASM OF UNCERTAIN BEHAVIOR OF SKIN: Status: ACTIVE | Noted: 2019-08-28

## 2019-08-28 PROBLEM — D18.01 HEMANGIOMA OF SKIN AND SUBCUTANEOUS TISSUE: Status: ACTIVE | Noted: 2019-08-28

## 2019-08-28 PROBLEM — D22.5 MELANOCYTIC NEVI OF TRUNK: Status: ACTIVE | Noted: 2019-08-28

## 2019-08-28 PROCEDURE — ? COUNSELING

## 2019-08-28 PROCEDURE — ? OBSERVATION

## 2019-08-28 PROCEDURE — 99203 OFFICE O/P NEW LOW 30 MIN: CPT

## 2019-08-28 ASSESSMENT — LOCATION DETAILED DESCRIPTION DERM
LOCATION DETAILED: LEFT ANTECUBITAL SKIN
LOCATION DETAILED: RIGHT INFERIOR UPPER BACK
LOCATION DETAILED: RIGHT ANTERIOR DISTAL THIGH
LOCATION DETAILED: RIGHT ANTERIOR PROXIMAL UPPER ARM
LOCATION DETAILED: RIGHT PROXIMAL DORSAL FOREARM
LOCATION DETAILED: RIGHT CENTRAL MALAR CHEEK
LOCATION DETAILED: LEFT ANTERIOR PROXIMAL UPPER ARM
LOCATION DETAILED: LEFT PROXIMAL DORSAL FOREARM
LOCATION DETAILED: LEFT INFERIOR CENTRAL MALAR CHEEK
LOCATION DETAILED: LEFT MEDIAL SUPERIOR CHEST
LOCATION DETAILED: LEFT ANTERIOR DISTAL THIGH
LOCATION DETAILED: RIGHT SUPERIOR MEDIAL UPPER BACK
LOCATION DETAILED: RIGHT MID-UPPER BACK

## 2019-08-28 ASSESSMENT — LOCATION SIMPLE DESCRIPTION DERM
LOCATION SIMPLE: RIGHT UPPER ARM
LOCATION SIMPLE: RIGHT THIGH
LOCATION SIMPLE: LEFT FOREARM
LOCATION SIMPLE: RIGHT UPPER BACK
LOCATION SIMPLE: LEFT UPPER ARM
LOCATION SIMPLE: LEFT THIGH
LOCATION SIMPLE: RIGHT FOREARM
LOCATION SIMPLE: CHEST
LOCATION SIMPLE: LEFT CHEEK
LOCATION SIMPLE: RIGHT CHEEK

## 2019-08-28 ASSESSMENT — LOCATION ZONE DERM
LOCATION ZONE: ARM
LOCATION ZONE: LEG
LOCATION ZONE: FACE
LOCATION ZONE: TRUNK

## 2019-09-16 ENCOUNTER — HOSPITAL ENCOUNTER (OUTPATIENT)
Dept: RADIOLOGY | Facility: MEDICAL CENTER | Age: 69
End: 2019-09-16
Attending: FAMILY MEDICINE
Payer: MEDICARE

## 2019-09-16 DIAGNOSIS — Z12.31 SCREENING MAMMOGRAM, ENCOUNTER FOR: ICD-10-CM

## 2019-09-16 PROCEDURE — 77063 BREAST TOMOSYNTHESIS BI: CPT

## 2019-09-24 ENCOUNTER — NON-PROVIDER VISIT (OUTPATIENT)
Dept: MEDICAL GROUP | Facility: PHYSICIAN GROUP | Age: 69
End: 2019-09-24
Payer: MEDICARE

## 2019-09-24 DIAGNOSIS — Z23 NEED FOR VACCINATION: ICD-10-CM

## 2019-09-24 PROCEDURE — G0008 ADMIN INFLUENZA VIRUS VAC: HCPCS | Performed by: FAMILY MEDICINE

## 2019-09-24 PROCEDURE — 90662 IIV NO PRSV INCREASED AG IM: CPT | Performed by: FAMILY MEDICINE

## 2019-09-24 NOTE — PROGRESS NOTES
"Argelia Jimenez is a 69 y.o. female here for a non-provider visit for:   FLU    Reason for immunization: Annual Flu Vaccine  Immunization records indicate need for vaccine: Yes, confirmed with Epic  Minimum interval has been met for this vaccine: Yes  ABN completed: Yes    Order and dose verified by: Radha RODRIGUEZ Dated  08/15/2019 was given to patient: Yes  All IAC Questionnaire questions were answered \"No.\"    Patient tolerated injection and no adverse effects were observed or reported: Yes    Pt scheduled for next dose in series: Not Indicated    "

## 2020-01-22 ENCOUNTER — OFFICE VISIT (OUTPATIENT)
Dept: MEDICAL GROUP | Facility: PHYSICIAN GROUP | Age: 70
End: 2020-01-22
Payer: MEDICARE

## 2020-01-22 VITALS
SYSTOLIC BLOOD PRESSURE: 99 MMHG | BODY MASS INDEX: 23.77 KG/M2 | WEIGHT: 147.93 LBS | HEART RATE: 63 BPM | OXYGEN SATURATION: 97 % | RESPIRATION RATE: 16 BRPM | TEMPERATURE: 98.6 F | HEIGHT: 66 IN | DIASTOLIC BLOOD PRESSURE: 70 MMHG

## 2020-01-22 DIAGNOSIS — Z78.0 POSTMENOPAUSAL: ICD-10-CM

## 2020-01-22 DIAGNOSIS — F11.20 OPIOID DEPENDENCE, UNCOMPLICATED (HCC): ICD-10-CM

## 2020-01-22 DIAGNOSIS — Z00.00 MEDICARE ANNUAL WELLNESS VISIT, SUBSEQUENT: ICD-10-CM

## 2020-01-22 DIAGNOSIS — M54.41 CHRONIC BILATERAL LOW BACK PAIN WITH RIGHT-SIDED SCIATICA: ICD-10-CM

## 2020-01-22 DIAGNOSIS — G89.29 CHRONIC BILATERAL LOW BACK PAIN WITH RIGHT-SIDED SCIATICA: ICD-10-CM

## 2020-01-22 DIAGNOSIS — E89.0 POSTSURGICAL HYPOTHYROIDISM: ICD-10-CM

## 2020-01-22 DIAGNOSIS — Z23 NEED FOR SHINGLES VACCINE: ICD-10-CM

## 2020-01-22 DIAGNOSIS — I47.10 SVT (SUPRAVENTRICULAR TACHYCARDIA) (HCC): ICD-10-CM

## 2020-01-22 DIAGNOSIS — E78.5 DYSLIPIDEMIA: ICD-10-CM

## 2020-01-22 DIAGNOSIS — I10 ESSENTIAL HYPERTENSION: ICD-10-CM

## 2020-01-22 DIAGNOSIS — K21.9 GASTROESOPHAGEAL REFLUX DISEASE, ESOPHAGITIS PRESENCE NOT SPECIFIED: ICD-10-CM

## 2020-01-22 DIAGNOSIS — Z79.891 CHRONIC USE OF OPIATE FOR THERAPEUTIC PURPOSE: ICD-10-CM

## 2020-01-22 DIAGNOSIS — D70.9 NEUTROPENIA, UNSPECIFIED TYPE (HCC): ICD-10-CM

## 2020-01-22 PROCEDURE — 8041 PR SCP AHA: Performed by: FAMILY MEDICINE

## 2020-01-22 PROCEDURE — G0439 PPPS, SUBSEQ VISIT: HCPCS | Performed by: FAMILY MEDICINE

## 2020-01-22 PROCEDURE — 90471 IMMUNIZATION ADMIN: CPT | Performed by: FAMILY MEDICINE

## 2020-01-22 PROCEDURE — 90750 HZV VACC RECOMBINANT IM: CPT | Performed by: FAMILY MEDICINE

## 2020-01-22 RX ORDER — HYDROCODONE BITARTRATE AND ACETAMINOPHEN 5; 325 MG/1; MG/1
1 TABLET ORAL EVERY 8 HOURS PRN
COMMUNITY
End: 2020-04-22

## 2020-01-22 ASSESSMENT — PATIENT HEALTH QUESTIONNAIRE - PHQ9: CLINICAL INTERPRETATION OF PHQ2 SCORE: 0

## 2020-01-22 ASSESSMENT — ACTIVITIES OF DAILY LIVING (ADL): BATHING_REQUIRES_ASSISTANCE: 0

## 2020-01-22 ASSESSMENT — ENCOUNTER SYMPTOMS: GENERAL WELL-BEING: GOOD

## 2020-01-22 NOTE — PROGRESS NOTES
Chief Complaint   Patient presents with   • Annual Wellness Visit         HPI:  Argelia is a 69 y.o. here for Medicare Annual Wellness Visit        Patient Active Problem List    Diagnosis Date Noted   • Dyslipidemia 07/26/2019   • PAC (premature atrial contraction)    • PSVT (paroxysmal supraventricular tachycardia) (HCC)    • History of thyroid cancer    • Postsurgical hypothyroidism    • GERD (gastroesophageal reflux disease)    • Insomnia    • Chronic use of opiate for therapeutic purpose        Current Outpatient Medications   Medication Sig Dispense Refill   • HYDROcodone-acetaminophen (NORCO) 5-325 MG Tab per tablet Take 1 Tab by mouth every 8 hours as needed.     • metoprolol SR (TOPROL XL) 25 MG TABLET SR 24 HR TAKE 1 TABLET BY MOUTH ONCE DAILY 90 Tab 1   • levothyroxine (SYNTHROID) 88 MCG Tab TAKE 1 TABLET BY MOUTH ONCE DAILY IN THE MORNING ON AN EMPTY STOMACH 90 Tab 1   • Meloxicam (MOBIC PO) Take 150 mg by mouth every day.     • gabapentin (NEURONTIN) 300 MG Cap Take 900 mg by mouth every day.     • omeprazole (PRILOSEC) 40 MG delayed-release capsule TAKE 1 CAPSULE BY MOUTH ONCE DAILY 90 Cap 1   • Cyanocobalamin (VITAMIN B-12) 1000 MCG Tab Take 1,000 mcg by mouth every day.     • Omega-3 Fatty Acids (FISH OIL PO) Take  by mouth.     • Multiple Minerals-Vitamins (CALCIUM-MAGNESIUM-ZINC-D3) Tab Take  by mouth.     • BIOTIN PO Take  by mouth.     • CRANBERRY EXTRACT PO Take  by mouth.     • aspirin (ASA) 81 MG Chew Tab chewable tablet Take 81 mg by mouth every 48 hours.       No current facility-administered medications for this visit.         Patient is taking medications as noted in medication list.  Current supplements as per medication list.     Allergies: Patient has no allergy information on record.    Current social contact/activities: travel, car shows, sewing, puzzles  Shinto    Is patient current with immunizations? Yes.    She  reports that she has never smoked. She has never used smokeless  tobacco. She reports current alcohol use. She reports that she does not use drugs.  Counseling given: Not Answered        DPA/Advanced directive: Patient has Living Will on file.     ROS:    Gait: Uses no assistive device   Ostomy: No   Other tubes: No   Amputations: No   Chronic oxygen use No   Last eye exam 01/2020   Wears hearing aids: No   : Reports urinary leakage during the last 6 months that has not interfered at all with their daily activities or sleep.      Screening:        Little interest or pleasure in doing things?  0 - not at all  Feeling down, depressed, or hopeless? 0 - not at all  Patient Health Questionnaire Score: 0    If depressive symptoms identified deferred to follow up visit unless specifically addressed in assessment and plan.    Interpretation of PHQ-9 Total Score   Score Severity   1-4 No Depression   5-9 Mild Depression   10-14 Moderate Depression   15-19 Moderately Severe Depression   20-27 Severe Depression    Screening for Cognitive Impairment    Three Minute Recall (village, kitchen, baby)  3/3 Village kitchen baby  Martín clock face with all 12 numbers and set the hands to show 10 past 10.  Yes Time 10:10    5/5  If cognitive concerns identified, deferred for follow up unless specifically addressed in assessment and plan.    Fall Risk Assessment    Has the patient had two or more falls in the last year or any fall with injury in the last year?  No  If fall risk identified, deferred for follow up unless specifically addressed in assessment and plan.    Safety Assessment    Throw rugs on floor.  Yes  Handrails on all stairs.  Yes  Good lighting in all hallways.  Yes  Difficulty hearing.  No  Patient counseled about all safety risks that were identified.    Functional Assessment ADLs    Are there any barriers preventing you from cooking for yourself or meeting nutritional needs?  No.    Are there any barriers preventing you from driving safely or obtaining transportation?  No.    Are  there any barriers preventing you from using a telephone or calling for help?  No.    Are there any barriers preventing you from shopping?  No.    Are there any barriers preventing you from taking care of your own finances?  No.    Are there any barriers preventing you from managing your medications?  No.    Are there any barriers preventing you from showering, bathing or dressing yourself?  No.    Are you currently engaging in any exercise or physical activity?  Yes.  Walking,  What is your perception of your health?  Good.    Health Maintenance Summary                PAP SMEAR Overdue 8/23/1971     BONE DENSITY Overdue 8/23/2015     URINE DRUG SCREEN Overdue 12/8/2019      Done 12/13/2018 [facility] PAIN MANAGEMENT SCREEN     Patient has more history with this topic...    Annual Wellness Visit Overdue 1/22/2020      Done 1/21/2019 Visit Dx: Medicare annual wellness visit, initial    IMM ZOSTER VACCINES Postponed 6/17/2020 Originally 11/28/2011. System: vaccine not available, other system reasons     Done 10/3/2011 Imm Admin: Zoster Vaccine Live (ZVL) (Zostavax)    MAMMOGRAM Next Due 9/16/2020      Done 9/16/2019 MA-SCREENING MAMMO BILAT W/TOMOSYNTHESIS W/CAD     Patient has more history with this topic...    COLONOSCOPY Next Due 10/14/2021      Done 10/14/2016     IMM DTaP/Tdap/Td Vaccine Next Due 7/17/2022      Done 7/17/2012 Imm Admin: Tdap Vaccine          Patient Care Team:  Cheryl Pineda M.D. as PCP - General (Family Medicine)  Alek Sandoval Ass't as      Social History     Tobacco Use   • Smoking status: Never Smoker   • Smokeless tobacco: Never Used   Substance Use Topics   • Alcohol use: Yes     Comment: 2 beers, 2-3 vodka/week   • Drug use: No     Family History   Problem Relation Age of Onset   • Cancer Mother 60        breast cancer   • Heart Disease Sister         rapid heartbeat   • Cancer Brother         colon cancer   • Alcohol/Drug Brother         alcoholism   • No  "Known Problems Brother      She  has a past medical history of Chronic bilateral low back pain with bilateral sciatica, Chronic use of opiate for therapeutic purpose, GERD (gastroesophageal reflux disease), History of thyroid cancer, Insomnia, PAC (premature atrial contraction), Postsurgical hypothyroidism, and PSVT (paroxysmal supraventricular tachycardia) (HCC).   Past Surgical History:   Procedure Laterality Date   • COLONOSCOPY  2016    normal   • THYROIDECTOMY Right 1993   • THYROIDECTOMY Left 1990   • APPENDECTOMY     • OTHER Left     ruptured ovarian cyst   • TONSILLECTOMY             Exam:     BP (!) 99/70 (BP Location: Right arm, Patient Position: Sitting, BP Cuff Size: Adult)   Pulse 63   Temp 37 °C (98.6 °F) (Temporal)   Resp 16   Ht 1.676 m (5' 6\")   Wt 67.1 kg (147 lb 14.9 oz)   SpO2 97%  Body mass index is 23.88 kg/m².    Hearing good.    Dentition fair  Alert, oriented in no acute distress.  Eye contact is good, speech goal directed, affect calm  Skin:                 Warm, no rashes in visible areas    Head:               Atraumatic, normocephalic    Eyes:               Pupils equal, round and reactive to light, extraocular muscles movement intact, clear conjunctivae    Ears:               Tympanic membranes intact without evidence of infection    Nose:              No nasal discharge, no obstruction    Mouth:             No oral lesions    Throat:            Tonsils not enlarged, no exudates    Neck:              Trachea midline, supple, no lymphadenopathy, no thyromegaly    Lungs:             Clear to auscultation, no rales, no wheezes, no rhonchi    Heart:              Regular rate and rhythm, no murmur    Abdomen:       Good bowel sounds, soft, nontender, no hepatosplenomegaly, no masses    Extremities:    No edema, no clubbing, no cyanosis    Psych:            Alert and oriented x3, normal affect    Neuro:            Cranial nerves intact, Motor strength 5/5 upper and lower extremities,    "                              DTRs 2+, sensation intact to light touch, negative [facility] Outpatient Visit on 07/25/2019   Component Date Value Ref Range Status   • Cholesterol,Tot 07/25/2019 203* 100 - 199 mg/dL Final   • Triglycerides 07/25/2019 98  0 - 149 mg/dL Final   • HDL 07/25/2019 64  >=40 mg/dL Final   • LDL 07/25/2019 119* <100 mg/dL Final   • Sodium 07/25/2019 137  135 - 145 mmol/L Final   • Potassium 07/25/2019 4.2  3.6 - 5.5 mmol/L Final   • Chloride 07/25/2019 104  96 - 112 mmol/L Final   • Co2 07/25/2019 25  20 - 33 mmol/L Final   • Anion Gap 07/25/2019 8.0  0.0 - 11.9 Final   • Glucose 07/25/2019 90  65 - 99 mg/dL Final   • Bun 07/25/2019 18  8 - 22 mg/dL Final   • Creatinine 07/25/2019 0.99  0.50 - 1.40 mg/dL Final   • Calcium 07/25/2019 9.7  8.5 - 10.5 mg/dL Final   • AST(SGOT) 07/25/2019 21  12 - 45 U/L Final   • ALT(SGPT) 07/25/2019 18  2 - 50 U/L Final   • Alkaline Phosphatase 07/25/2019 55  30 - 99 U/L Final   • Total Bilirubin 07/25/2019 0.5  0.1 - 1.5 mg/dL Final   • Albumin 07/25/2019 4.1  3.2 - 4.9 g/dL Final   • Total Protein 07/25/2019 6.8  6.0 - 8.2 g/dL Final   • Globulin 07/25/2019 2.7  1.9 - 3.5 g/dL Final   • A-G Ratio 07/25/2019 1.5  g/dL Final   • WBC 07/25/2019 4.0* 4.8 - 10.8 K/uL Final   • RBC 07/25/2019 3.87* 4.20 - 5.40 M/uL Final   • Hemoglobin 07/25/2019 13.2  12.0 - 16.0 g/dL Final   • Hematocrit 07/25/2019 39.9  37.0 - 47.0 % Final   • MCV 07/25/2019 103.1* 81.4 - 97.8 fL Final   • MCH 07/25/2019 34.1* 27.0 - 33.0 pg Final   • MCHC 07/25/2019 33.1* 33.6 - 35.0 g/dL Final   • RDW 07/25/2019 48.7  35.9 - 50.0 fL Final   • Platelet Count 07/25/2019 317  164 - 446 K/uL Final   • MPV 07/25/2019 9.8  9.0 - 12.9 fL Final   • Neutrophils-Polys 07/25/2019 41.50* 44.00 - 72.00 % Final   • Lymphocytes 07/25/2019 41.40* 22.00 - 41.00 % Final   • Monocytes 07/25/2019 11.70  0.00 - 13.40 % Final   • Eosinophils 07/25/2019 3.70  0.00 - 6.90 % Final   • Basophils 07/25/2019  1.20  0.00 - 1.80 % Final   • Immature Granulocytes 07/25/2019 0.50  0.00 - 0.90 % Final   • Nucleated RBC 07/25/2019 0.00  /100 WBC Final   • Neutrophils (Absolute) 07/25/2019 1.67* 2.00 - 7.15 K/uL Final    Includes immature neutrophils, if present.   • Lymphs (Absolute) 07/25/2019 1.67  1.00 - 4.80 K/uL Final   • Monos (Absolute) 07/25/2019 0.47  0.00 - 0.85 K/uL Final   • Eos (Absolute) 07/25/2019 0.15  0.00 - 0.51 K/uL Final   • Baso (Absolute) 07/25/2019 0.05  0.00 - 0.12 K/uL Final   • Immature Granulocytes (abs) 07/25/2019 0.02  0.00 - 0.11 K/uL Final   • NRBC (Absolute) 07/25/2019 0.00  K/uL Final   • TSH 07/25/2019 0.460  0.380 - 5.330 uIU/mL Final    Comment: Please note new reference ranges effective 12/14/2017 10:00 AM  Pregnant Females, 1st Trimester  0.050-3.700  Pregnant Females, 2nd Trimester  0.310-4.350  Pregnant Females, 3rd Trimester  0.410-5.180     • Fasting Status 07/25/2019 Fasting   Final   • GFR If  07/25/2019 >60  >60 mL/min/1.73 m 2 Final   • GFR If Non  07/25/2019 56* >60 mL/min/1.73 m 2 Final         Assessment and Plan. The following treatment and monitoring plan is recommended:    1. Medicare annual wellness visit, subsequent  Up-to-date with all immunizations except for Shingrix and first dose was given.  She will get the second dose 2 to 6 months from now.  Next mammogram is due in September 2020.  Next colonoscopy due in October 2021.  Aged out for GYN exam/Pap smear.    2. Chronic bilateral low back pain with right-sided sciatica  This is a chronic problem due to multilevel DDD and grade 1 L4 anterolisthesis seen on lumbar spine x-ray and multilevel DDD, spondylosis, central canal stenosis marked at L3 and L4 level seen on MRI of the lumbar spine.  She is currently under the care of Dr. Thomas.  Dr. Thomas has prescribed gabapentin 300 mg 1 tablet 3 times a day but she said she has been taking it only twice a day most of the time and meloxicam 15  mg 1 tablet daily.  She takes hydrocodone/APAP 5/325 very sparingly about 2 tablets/week which I have been prescribing.  Her last refill was in July 2018 for 100 tablets.  Urine drug screen was last done in December 2018 that came back consistent with medication prescribed without illicit drugs or recreational drugs.  Her last dose of the medication was 4 to 5 days ago.  She said she does not need any refill at this time.  She wants me to take over with prescribing the meloxicam and gabapentin instead of going back to see Dr. Thomas since she has been stable on her meds.  I told the patient I will take over with prescription but if her symptoms worsen she will have to go back and see Dr. Thomas.  She agrees with the plan.  We will follow-up in 3 months.    3. Chronic use of opiate for therapeutic purpose  Same as #2.    4. Opioid dependence, uncomplicated (HCC)  Same as #2.    5. Essential hypertension  Patient was takes metoprolol for hypertension with good control of her blood pressure.    6.  SVT  She currently takes metoprolol which controls her problem.  She only has very occasional palpitations.  She is followed by the cardiologist.    7. Dyslipidemia  Her last lipid panel in July 2019 came back mild elevation of LDL cholesterol at 119.  10-year ASCVD risk was slightly elevated at 8.7%.  She opted to continue managing this with her own efforts instead of taking statin.  We will do follow-up lipid panel to see if this is improved or worsen.  Consider statin depending on results.    8. Postsurgical hypothyroidism  Status post thyroidectomy for thyroid cancer.  Last TSH came back adequately replaced.  Continue current dose of levothyroxine.    9. Gastroesophageal reflux disease, esophagitis presence not specified  She takes omeprazole every other day with good results.    10. Postmenopausal  She needs screening bone density scan and this was ordered.    11. Neutropenia, unspecified type (HCC)  We have seen mild  neutropenia in November 2017 at 4.5k and then it normalized in January 2018 and then recurred in July 2019 at 4.0K.  This is associated with mild lymphocytosis and mildly low ANC.  We will do a repeat CBC.  Patient is asymptomatic without any B symptoms.    12. Need for shingles vaccine  She was given first dose of Shingrix and she will get a second dose in 2 to 6 months time.    Services suggested: No services needed at this time  Health Care Screening recommendations as per orders if indicated.  Referrals offered: PT/OT/Nutrition counseling/Behavioral Health/Smoking cessation as per orders if indicated.    Discussion today about general wellness and lifestyle habits:    · Prevent falls and reduce trip hazards; Cautioned about securing or removing rugs.  · Have a working fire alarm and carbon monoxide detector;   · Engage in regular physical activity and social activities       Follow-up: No follow-ups on file.

## 2020-01-28 ENCOUNTER — PATIENT OUTREACH (OUTPATIENT)
Dept: HEALTH INFORMATION MANAGEMENT | Facility: OTHER | Age: 70
End: 2020-01-28

## 2020-01-28 NOTE — PROGRESS NOTES
1. HealthConnect Verified: yes    2. Verify PCP: yes    3. Review and add  to Care Team: yes      4. Reviewed/Updated the following with patient:       •   Communication Preference Obtained? YES  • MyChart Activation: already active       •   E-Mail Address Obtained? YES       •   Appointment Day and Time Preferences? YES       •   Preferred Pharmacy? YES       •   Preferred Lab? YES    5. Care Gap Scheduling (Attempt to Schedule EACH Overdue Care Gap!)

## 2020-01-29 ENCOUNTER — APPOINTMENT (OUTPATIENT)
Dept: RADIOLOGY | Facility: MEDICAL CENTER | Age: 70
End: 2020-01-29
Attending: FAMILY MEDICINE
Payer: MEDICARE

## 2020-02-10 ENCOUNTER — APPOINTMENT (OUTPATIENT)
Dept: RADIOLOGY | Facility: MEDICAL CENTER | Age: 70
End: 2020-02-10
Attending: FAMILY MEDICINE
Payer: MEDICARE

## 2020-02-10 ENCOUNTER — HOSPITAL ENCOUNTER (OUTPATIENT)
Dept: LAB | Facility: MEDICAL CENTER | Age: 70
End: 2020-02-10
Attending: FAMILY MEDICINE
Payer: MEDICARE

## 2020-02-10 DIAGNOSIS — E89.0 POSTSURGICAL HYPOTHYROIDISM: ICD-10-CM

## 2020-02-10 DIAGNOSIS — E78.5 DYSLIPIDEMIA: ICD-10-CM

## 2020-02-10 DIAGNOSIS — I10 ESSENTIAL HYPERTENSION: ICD-10-CM

## 2020-02-10 LAB
ALBUMIN SERPL BCP-MCNC: 4.4 G/DL (ref 3.2–4.9)
ALBUMIN/GLOB SERPL: 1.9 G/DL
ALP SERPL-CCNC: 58 U/L (ref 30–99)
ALT SERPL-CCNC: 17 U/L (ref 2–50)
ANION GAP SERPL CALC-SCNC: 7 MMOL/L (ref 0–11.9)
AST SERPL-CCNC: 22 U/L (ref 12–45)
BASOPHILS # BLD AUTO: 1.3 % (ref 0–1.8)
BASOPHILS # BLD: 0.06 K/UL (ref 0–0.12)
BILIRUB SERPL-MCNC: 0.5 MG/DL (ref 0.1–1.5)
BUN SERPL-MCNC: 19 MG/DL (ref 8–22)
CALCIUM SERPL-MCNC: 9.4 MG/DL (ref 8.5–10.5)
CHLORIDE SERPL-SCNC: 104 MMOL/L (ref 96–112)
CHOLEST SERPL-MCNC: 202 MG/DL (ref 100–199)
CO2 SERPL-SCNC: 27 MMOL/L (ref 20–33)
CREAT SERPL-MCNC: 0.89 MG/DL (ref 0.5–1.4)
EOSINOPHIL # BLD AUTO: 0.18 K/UL (ref 0–0.51)
EOSINOPHIL NFR BLD: 3.9 % (ref 0–6.9)
ERYTHROCYTE [DISTWIDTH] IN BLOOD BY AUTOMATED COUNT: 48.4 FL (ref 35.9–50)
FASTING STATUS PATIENT QL REPORTED: NORMAL
GLOBULIN SER CALC-MCNC: 2.3 G/DL (ref 1.9–3.5)
GLUCOSE SERPL-MCNC: 77 MG/DL (ref 65–99)
HCT VFR BLD AUTO: 42 % (ref 37–47)
HDLC SERPL-MCNC: 62 MG/DL
HGB BLD-MCNC: 14.4 G/DL (ref 12–16)
IMM GRANULOCYTES # BLD AUTO: 0.01 K/UL (ref 0–0.11)
IMM GRANULOCYTES NFR BLD AUTO: 0.2 % (ref 0–0.9)
LDLC SERPL CALC-MCNC: 118 MG/DL
LYMPHOCYTES # BLD AUTO: 1.36 K/UL (ref 1–4.8)
LYMPHOCYTES NFR BLD: 29.2 % (ref 22–41)
MCH RBC QN AUTO: 34.4 PG (ref 27–33)
MCHC RBC AUTO-ENTMCNC: 34.3 G/DL (ref 33.6–35)
MCV RBC AUTO: 100.2 FL (ref 81.4–97.8)
MONOCYTES # BLD AUTO: 0.47 K/UL (ref 0–0.85)
MONOCYTES NFR BLD AUTO: 10.1 % (ref 0–13.4)
NEUTROPHILS # BLD AUTO: 2.58 K/UL (ref 2–7.15)
NEUTROPHILS NFR BLD: 55.3 % (ref 44–72)
NRBC # BLD AUTO: 0 K/UL
NRBC BLD-RTO: 0 /100 WBC
PLATELET # BLD AUTO: 280 K/UL (ref 164–446)
PMV BLD AUTO: 9.7 FL (ref 9–12.9)
POTASSIUM SERPL-SCNC: 3.8 MMOL/L (ref 3.6–5.5)
PROT SERPL-MCNC: 6.7 G/DL (ref 6–8.2)
RBC # BLD AUTO: 4.19 M/UL (ref 4.2–5.4)
SODIUM SERPL-SCNC: 138 MMOL/L (ref 135–145)
TRIGL SERPL-MCNC: 109 MG/DL (ref 0–149)
TSH SERPL DL<=0.005 MIU/L-ACNC: 2.31 UIU/ML (ref 0.38–5.33)
WBC # BLD AUTO: 4.7 K/UL (ref 4.8–10.8)

## 2020-02-10 PROCEDURE — 80053 COMPREHEN METABOLIC PANEL: CPT

## 2020-02-10 PROCEDURE — 36415 COLL VENOUS BLD VENIPUNCTURE: CPT

## 2020-02-10 PROCEDURE — 84443 ASSAY THYROID STIM HORMONE: CPT

## 2020-02-10 PROCEDURE — 85025 COMPLETE CBC W/AUTO DIFF WBC: CPT

## 2020-02-10 PROCEDURE — 80061 LIPID PANEL: CPT

## 2020-02-10 RX ORDER — MELOXICAM 15 MG/1
15 TABLET ORAL DAILY
Qty: 30 TAB | Refills: 2 | Status: SHIPPED | OUTPATIENT
Start: 2020-02-10 | End: 2020-06-08

## 2020-02-14 ENCOUNTER — HOSPITAL ENCOUNTER (OUTPATIENT)
Dept: RADIOLOGY | Facility: MEDICAL CENTER | Age: 70
End: 2020-02-14
Attending: FAMILY MEDICINE
Payer: MEDICARE

## 2020-02-14 DIAGNOSIS — Z78.0 POSTMENOPAUSAL: ICD-10-CM

## 2020-02-14 PROCEDURE — 77080 DXA BONE DENSITY AXIAL: CPT

## 2020-04-03 RX ORDER — METOPROLOL SUCCINATE 25 MG/1
TABLET, EXTENDED RELEASE ORAL
Qty: 100 TAB | Refills: 1 | Status: SHIPPED | OUTPATIENT
Start: 2020-04-03 | End: 2020-09-28 | Stop reason: SDUPTHER

## 2020-04-03 RX ORDER — LEVOTHYROXINE SODIUM 88 UG/1
TABLET ORAL
Qty: 100 TAB | Refills: 1 | Status: SHIPPED | OUTPATIENT
Start: 2020-04-03 | End: 2020-09-28 | Stop reason: SDUPTHER

## 2020-04-14 ENCOUNTER — HOSPITAL ENCOUNTER (OUTPATIENT)
Facility: MEDICAL CENTER | Age: 70
End: 2020-04-14
Attending: FAMILY MEDICINE
Payer: MEDICARE

## 2020-04-14 ENCOUNTER — OFFICE VISIT (OUTPATIENT)
Dept: MEDICAL GROUP | Facility: PHYSICIAN GROUP | Age: 70
End: 2020-04-14
Payer: MEDICARE

## 2020-04-14 VITALS
BODY MASS INDEX: 24.27 KG/M2 | HEIGHT: 66 IN | OXYGEN SATURATION: 95 % | HEART RATE: 54 BPM | DIASTOLIC BLOOD PRESSURE: 80 MMHG | TEMPERATURE: 97.9 F | SYSTOLIC BLOOD PRESSURE: 126 MMHG | WEIGHT: 151 LBS

## 2020-04-14 DIAGNOSIS — N39.0 URINARY TRACT INFECTION WITHOUT HEMATURIA, SITE UNSPECIFIED: ICD-10-CM

## 2020-04-14 LAB
APPEARANCE UR: NORMAL
BILIRUB UR STRIP-MCNC: NORMAL MG/DL
COLOR UR AUTO: NORMAL
GLUCOSE UR STRIP.AUTO-MCNC: NORMAL MG/DL
KETONES UR STRIP.AUTO-MCNC: NORMAL MG/DL
LEUKOCYTE ESTERASE UR QL STRIP.AUTO: NORMAL
NITRITE UR QL STRIP.AUTO: NORMAL
PH UR STRIP.AUTO: 7 [PH] (ref 5–8)
PROT UR QL STRIP: NORMAL MG/DL
RBC UR QL AUTO: NORMAL
SP GR UR STRIP.AUTO: 1.01
UROBILINOGEN UR STRIP-MCNC: 0.2 MG/DL

## 2020-04-14 PROCEDURE — 99214 OFFICE O/P EST MOD 30 MIN: CPT | Performed by: FAMILY MEDICINE

## 2020-04-14 PROCEDURE — 99000 SPECIMEN HANDLING OFFICE-LAB: CPT | Performed by: FAMILY MEDICINE

## 2020-04-14 PROCEDURE — 81002 URINALYSIS NONAUTO W/O SCOPE: CPT | Performed by: FAMILY MEDICINE

## 2020-04-14 PROCEDURE — 87186 SC STD MICRODIL/AGAR DIL: CPT

## 2020-04-14 PROCEDURE — 87077 CULTURE AEROBIC IDENTIFY: CPT

## 2020-04-14 PROCEDURE — 87086 URINE CULTURE/COLONY COUNT: CPT

## 2020-04-14 RX ORDER — OMEPRAZOLE 40 MG/1
CAPSULE, DELAYED RELEASE ORAL
Qty: 100 CAP | Refills: 1 | Status: SHIPPED | OUTPATIENT
Start: 2020-04-14 | End: 2021-04-20

## 2020-04-14 RX ORDER — NITROFURANTOIN 25; 75 MG/1; MG/1
100 CAPSULE ORAL 2 TIMES DAILY
Qty: 10 CAP | Refills: 0 | Status: SHIPPED
Start: 2020-04-14 | End: 2020-04-22

## 2020-04-14 ASSESSMENT — FIBROSIS 4 INDEX: FIB4 SCORE: 1.31

## 2020-04-14 NOTE — TELEPHONE ENCOUNTER
Received request via: Pharmacy    Was the patient seen in the last year in this department? Yes    Does the patient have an active prescription (recently filled or refills available) for medication(s) requested? No     Future Appointments       Provider Department Center    4/14/2020 2:20 PM Cheryl B Galdo, M.D. [facility] - [address]    4/22/2020 10:00 AM ST[facility] - [address]

## 2020-04-14 NOTE — PROGRESS NOTES
"Subjective:      Argelia Jimenez is a 69 y.o. female who presents with Cystitis            HPI     Patient is here complaining of UTI symptoms.  She said it started 2 days ago as urinary urgency and cloudy urine.  Last night she had chills and temperature was 99.4 °F.  She also had some nausea but no vomiting.  Denies any dysuria, flank pain, vomiting.  Denies any blood in the urine.    Past medical history, past surgical history, family history reviewed-no changes    Social history reviewed-no changes    Allergies reviewed-no changes    Medications reviewed-no changes    ROS     As per HPI, the rest are negative.       Objective:     /80 (BP Location: Right arm, Patient Position: Sitting, BP Cuff Size: Adult)   Pulse (!) 54   Temp 36.6 °C (97.9 °F) (Temporal)   Ht 1.676 m (5' 6\")   Wt 68.5 kg (151 lb)   SpO2 95%   BMI 24.37 kg/m²      Physical Exam     Examined alert, awake, oriented, not in distress    Neck-supple, no lymphadenopathy, no thyromegaly  Lungs-clear to auscultation, no rales, no wheezes  Heart-regular rate and rhythm, no murmur  Abdomen- good bowel sounds, soft, nontender, no hepatosplenomegaly, no masses, no CVA tenderness  Extremities-no edema, clubbing, cyanosis       Urine dipstick    Ref Range & Units 2:31 PM    POC Color Negative yl    POC Appearance Negative slightly cloudy    POC Leukocyte Esterase Negative mod    POC Nitrites Negative pos    POC Urobiligen Negative (0.2) mg/dL 0.2    POC Protein Negative mg/dL neg    POC Urine PH 5.0 - 8.0 7.0    POC Blood Negative mod    POC Specific Gravity <1.005 - >1.030 1.010    POC Ketones Negative mg/dL neg    POC Bilirubin Negative mg/dL neg    POC Glucose Negative mg/dL neg    Resulting Agency  [facility]         Specimen Collected: 04/14/20  2:31 PM Last Resulted: 04/14/20  2:31 PM             Assessment/Plan:       1. Urinary tract infection without hematuria, site unspecified  Urine dipstick consistent with UTI.  We will treat " with antibiotics pending culture results.  Advised increase p.o. fluids.  I will communicate results once they are in.   - POCT Urinalysis  - URINE CULTURE(NEW); Future  - nitrofurantoin (MACROBID) 100 MG Cap; Take 1 Cap by mouth 2 times a day.  Dispense: 10 Cap; Refill: 0    Keep appointment as scheduled next week.  She prefers to do virtual visit on that day.    Please note that this dictation was created using voice recognition software. I have worked with consultants from the vendor as well as technical experts from [facility] to optimize the interface. I have made every reasonable attempt to correct obvious errors, but I expect that there are errors of grammar and possibly content I did not discover before finalizing the note.

## 2020-04-18 LAB
BACTERIA UR CULT: ABNORMAL
BACTERIA UR CULT: ABNORMAL
SIGNIFICANT IND 70042: ABNORMAL
SITE SITE: ABNORMAL
SOURCE SOURCE: ABNORMAL

## 2020-04-22 ENCOUNTER — TELEMEDICINE (OUTPATIENT)
Dept: MEDICAL GROUP | Facility: PHYSICIAN GROUP | Age: 70
End: 2020-04-22
Payer: MEDICARE

## 2020-04-22 DIAGNOSIS — E89.0 POSTSURGICAL HYPOTHYROIDISM: ICD-10-CM

## 2020-04-22 DIAGNOSIS — F11.20 OPIOID DEPENDENCE, UNCOMPLICATED (HCC): ICD-10-CM

## 2020-04-22 DIAGNOSIS — E78.5 DYSLIPIDEMIA: ICD-10-CM

## 2020-04-22 DIAGNOSIS — I10 ESSENTIAL HYPERTENSION: ICD-10-CM

## 2020-04-22 DIAGNOSIS — G89.29 CHRONIC BILATERAL LOW BACK PAIN WITH BILATERAL SCIATICA: ICD-10-CM

## 2020-04-22 DIAGNOSIS — M54.42 CHRONIC BILATERAL LOW BACK PAIN WITH BILATERAL SCIATICA: ICD-10-CM

## 2020-04-22 DIAGNOSIS — M54.41 CHRONIC BILATERAL LOW BACK PAIN WITH BILATERAL SCIATICA: ICD-10-CM

## 2020-04-22 DIAGNOSIS — Z79.891 CHRONIC USE OF OPIATE FOR THERAPEUTIC PURPOSE: ICD-10-CM

## 2020-04-22 PROCEDURE — 99214 OFFICE O/P EST MOD 30 MIN: CPT | Mod: 95,CR | Performed by: FAMILY MEDICINE

## 2020-04-22 RX ORDER — HYDROCODONE BITARTRATE AND ACETAMINOPHEN 5; 325 MG/1; MG/1
1 TABLET ORAL EVERY 8 HOURS PRN
Qty: 100 TAB | Refills: 0 | Status: SHIPPED | OUTPATIENT
Start: 2020-04-22 | End: 2020-09-16 | Stop reason: SDUPTHER

## 2020-04-22 NOTE — PROGRESS NOTES
Telemedicine Visit: Established Patient     This encounter was conducted via [employer].   Verbal consent was obtained. Patient's identity was verified.    Subjective:   CC: Chronic pain recheck and follow-up of medical problems.  Argelia Jimenez is a 69 y.o. female presenting for evaluation and management of:    Patient has a history of chronic bilateral low back pain with Bilateral sciatica. X-ray of the lumbar spine in December 2018 showed multilevel DDD and grade 1 L4 anterolisthesis.  MRI showed multilevel DDD, spondylosis with central canal stenosis marked at L3-L4 level. She previously saw Dr. Villagran, but she was unhappy with her care. She requested a referral to PMR Dr. Thomas that I placed on her last follow-up visit in January. She currently takes Hydrocodone/APAP 5-325 mg very sparingly with good control of her pain; 100 tablets will typically last her 3-6 months. She has since followed-up with Dr. Thomas, and she is very pleased with her treatment so far. He prescribed her Meloxicam 15 mg QD and Gabapentin 300 mg TID. Due to this, she states her pain is greatly improved, and she is taking less Norco1 to 2 tablets 2 to 3 days a week.She also finished physical therapy which has helped significantly.  She was recommended to have epidural shot.  She said she has done it 10 to 15 years ago which did not help but she is thinking of exploring it again. Her last urine drug screen was in December 2018 that came back consistent with medication prescribed without illicit drugs or recreational drugs.  We will get a urine drug screen when she returns for an office visit.  We will also get the informed consent and controlled substance treatment agreement at that time.    Chronic pain recheck for: Chronic bilateral low back pain with bilateral sciatica  Last dose of controlled substance: 3 days ago  Chronic pain treated with Hydrocodone/APAP 5/325 1 to 2 tablets 2-3 times a week    She  reports current alcohol  use.  She  reports no history of drug use.    Interval history:   Any major change in health since last appointment? Yes She had UTI on 4/14/2020 confirmed by positive urine culture which were treated successfully with antibiotics with complete resolution of symptoms.    Consequences of Chronic Opiate therapy:  (5 A's)  Analgesia: Compared to no treatment or prior treatment, pain is currently improved  Activity: improved  Adverse Events: She denies constipation, dry mouth, itchy skin, nausea and sedation  Aberrant Behaviors: She reports she is taking medication as prescribed and is not veering from agreed treatment regimen or provider recommendations. There have been no inappropriate refills or lost/stolen meds reported.  Affect/Mood: Pain is not impacting patient's mood.  Patient denies depression/anxiety.    Nonnarcotic treatments that are being used: NSAIDs/RICK-2 , Gabapentin and physical therapy     Last imaging: MRI lumbar spine 12/18, x-ray lumbar spine 12/18    Opioid Risk Score: 1    Interpretation of Opioid Risk Score   Score 0-3 = Low risk of abuse. Do UDS at least once per year.  Score 4-7 = Moderate risk of abuse. Do UDS 1-4 times per year.  Score 8+ = High risk of abuse. Refer to specialist.    Last order of CONTROLLED SUBSTANCE TREATMENT AGREEMENT was found on 12/13/2018 from Office Visit on 12/13/2018     UDS Summary                URINE DRUG SCREEN Overdue 12/8/2019      Done 12/13/2018 [facility] PAIN MANAGEMENT SCREEN     Patient has more history with this topic...        Most recent UDS reviewed today and is consistent with prescribed medications.     I have reviewed the medical records, the Prescription Monitoring Program and I have determined that controlled substance treatment is medically indicated.     ROS  Denies any recent fevers or chills. No nausea or vomiting. No chest pains or shortness of breath.     Not on File    Current medicines (including changes today)  Current Outpatient  Medications   Medication Sig Dispense Refill   • HYDROcodone-acetaminophen (NORCO) 5-325 MG Tab per tablet Take 1 Tab by mouth every 8 hours as needed for up to 90 days. 100 Tab 0   • omeprazole (PRILOSEC) 40 MG delayed-release capsule Take 1 capsule by mouth once daily 100 Cap 1   • levothyroxine (SYNTHROID) 88 MCG Tab TAKE 1 TABLET BY MOUTH ONCE DAILY IN THE MORNING ON AN EMPTY STOMACH 100 Tab 1   • metoprolol SR (TOPROL XL) 25 MG TABLET SR 24 HR Take 1 tablet by mouth once daily 100 Tab 1   • meloxicam (MOBIC) 15 MG tablet Take 1 Tab by mouth every day. 30 Tab 2   • gabapentin (NEURONTIN) 300 MG Cap Take 900 mg by mouth every day.     • Cyanocobalamin (VITAMIN B-12) 1000 MCG Tab Take 1,000 mcg by mouth every day.     • Omega-3 Fatty Acids (FISH OIL PO) Take  by mouth.     • Multiple Minerals-Vitamins (CALCIUM-MAGNESIUM-ZINC-D3) Tab Take  by mouth.     • BIOTIN PO Take  by mouth.     • CRANBERRY EXTRACT PO Take  by mouth.     • aspirin (ASA) 81 MG Chew Tab chewable tablet Take 81 mg by mouth every 48 hours.       No current facility-administered medications for this visit.        Patient Active Problem List    Diagnosis Date Noted   • Dyslipidemia 07/26/2019   • PAC (premature atrial contraction)    • PSVT (paroxysmal supraventricular tachycardia) (HCC)    • History of thyroid cancer    • Postsurgical hypothyroidism    • GERD (gastroesophageal reflux disease)    • Insomnia    • Chronic use of opiate for therapeutic purpose        Family History   Problem Relation Age of Onset   • Cancer Mother 60        breast cancer   • Heart Disease Sister         rapid heartbeat   • Cancer Brother         colon cancer   • Alcohol/Drug Brother         alcoholism   • No Known Problems Brother        She  has a past medical history of Chronic bilateral low back pain with bilateral sciatica, Chronic use of opiate for therapeutic purpose, GERD (gastroesophageal reflux disease), History of thyroid cancer, Insomnia, PAC (premature  atrial contraction), Postsurgical hypothyroidism, and PSVT (paroxysmal supraventricular tachycardia) (HCC).  She  has a past surgical history that includes thyroidectomy (Left, 1990); thyroidectomy (Right, 1993); other (Left); appendectomy; tonsillectomy; and colonoscopy (2016).    patient continues to take thyroid replacement for postsurgical hypothyroidism.  The last TSH was adequately replaced in February 2020.    Patient continues to manage dyslipidemia with her own efforts.  The last lipid panel was in February 2020 which came back LDL improved from 119-118 with 10-year ASCVD risk of 6.7%.    In terms of her hypertension, she continues to take metoprolol XL 25 mg 1 tablet daily with good control of her blood pressure.     Objective:   Vitals obtained by patient:  Weight: 144.7 pounds  Height 5 feet 6 inches    Physical Exam:  Constitutional: Alert, no distress, well-groomed.  Skin: No rashes in visible areas.  Eye: Round. Conjunctiva clear, lids normal. No icterus.   ENMT: Lips pink without lesions, good dentition, moist mucous membranes. Phonation normal.  Neck: No masses, no thyromegaly. Moves freely without pain.  CV: Pulse as reported by patient  Respiratory: Unlabored respiratory effort, no cough or audible wheeze  Psych: Alert and oriented x3, normal affect and mood.       Assessment and Plan:   The following treatment plan was discussed:     1. Chronic bilateral low back pain with bilateral sciatica  - HYDROcodone-acetaminophen (NORCO) 5-325 MG Tab per tablet; Take 1 Tab by mouth every 8 hours as needed for up to 90 days.  Dispense: 100 Tab; Refill: 0    2. Chronic use of opiate for therapeutic purpose  - HYDROcodone-acetaminophen (NORCO) 5-325 MG Tab per tablet; Take 1 Tab by mouth every 8 hours as needed for up to 90 days.  Dispense: 100 Tab; Refill: 0    3. Opioid dependence, uncomplicated (HCC)  - HYDROcodone-acetaminophen (NORCO) 5-325 MG Tab per tablet; Take 1 Tab by mouth every 8 hours as needed  for up to 90 days.  Dispense: 100 Tab; Refill: 0    4. Postsurgical hypothyroidism    5. Dyslipidemia    6. Essential hypertension    1.Continue pain medication.  Refill sent to pharmacy electronically.  We will do updated UDS, controlled substance treatment agreement and informed consent when she comes for in office appointment in 3 months.I have reviewed the medical records, the prescription monitoring program and I have determined that the controlled prescription medication is medically indicated. I have advised the patient to keep the medication in a safe place and not to drive while taking the medication.    2.Same as #1.    3.Same as #1.    4.Continue thyroid replacement.  We will check TSH prior to her next visit in 3 months.    5  She will continue to manage this with her own efforts and we will recheck lipid panel next visit.    6.Controlled on her medication.  Continue metoprolol.    Follow-up in 3 months.      Please note that this dictation was created using voice recognition software. I have worked with consultants from the vendor as well as technical experts from [employer] to optimize the interface. I have made every reasonable attempt to correct obvious errors, but I expect that there are errors of grammar and possibly content I did not discover before finalizing the note.      Follow-up: 3 mos.      Please note that this dictation was created using voice recognition software. I have worked with consultants from the vendor as well as technical experts from [employer] to optimize the interface. I have made every reasonable attempt to correct obvious errors, but I expect that there are errors of grammar and possibly content I did not discover before finalizing the note.

## 2020-06-08 RX ORDER — MELOXICAM 15 MG/1
TABLET ORAL
Qty: 30 TAB | Refills: 0 | Status: SHIPPED | OUTPATIENT
Start: 2020-06-08 | End: 2020-07-14

## 2020-07-14 RX ORDER — MELOXICAM 15 MG/1
TABLET ORAL
Qty: 30 TAB | Refills: 1 | Status: SHIPPED | OUTPATIENT
Start: 2020-07-14 | End: 2020-09-16

## 2020-09-14 ENCOUNTER — TELEPHONE (OUTPATIENT)
Dept: MEDICAL GROUP | Facility: PHYSICIAN GROUP | Age: 70
End: 2020-09-14

## 2020-09-14 ENCOUNTER — HOSPITAL ENCOUNTER (OUTPATIENT)
Dept: LAB | Facility: MEDICAL CENTER | Age: 70
End: 2020-09-14
Attending: FAMILY MEDICINE
Payer: MEDICARE

## 2020-09-14 DIAGNOSIS — I10 ESSENTIAL HYPERTENSION: ICD-10-CM

## 2020-09-14 DIAGNOSIS — D70.9 NEUTROPENIA, UNSPECIFIED TYPE (HCC): ICD-10-CM

## 2020-09-14 DIAGNOSIS — E89.0 POSTSURGICAL HYPOTHYROIDISM: ICD-10-CM

## 2020-09-14 DIAGNOSIS — E78.5 DYSLIPIDEMIA: ICD-10-CM

## 2020-09-14 LAB
ALBUMIN SERPL BCP-MCNC: 4.4 G/DL (ref 3.2–4.9)
ALBUMIN/GLOB SERPL: 1.7 G/DL
ALP SERPL-CCNC: 66 U/L (ref 30–99)
ALT SERPL-CCNC: 18 U/L (ref 2–50)
ANION GAP SERPL CALC-SCNC: 14 MMOL/L (ref 7–16)
AST SERPL-CCNC: 19 U/L (ref 12–45)
BASOPHILS # BLD AUTO: 0.4 % (ref 0–1.8)
BASOPHILS # BLD: 0.03 K/UL (ref 0–0.12)
BILIRUB SERPL-MCNC: 0.3 MG/DL (ref 0.1–1.5)
BUN SERPL-MCNC: 15 MG/DL (ref 8–22)
CALCIUM SERPL-MCNC: 9.8 MG/DL (ref 8.5–10.5)
CHLORIDE SERPL-SCNC: 98 MMOL/L (ref 96–112)
CHOLEST SERPL-MCNC: 226 MG/DL (ref 100–199)
CO2 SERPL-SCNC: 24 MMOL/L (ref 20–33)
CREAT SERPL-MCNC: 0.8 MG/DL (ref 0.5–1.4)
EOSINOPHIL # BLD AUTO: 0.02 K/UL (ref 0–0.51)
EOSINOPHIL NFR BLD: 0.3 % (ref 0–6.9)
ERYTHROCYTE [DISTWIDTH] IN BLOOD BY AUTOMATED COUNT: 48.5 FL (ref 35.9–50)
FASTING STATUS PATIENT QL REPORTED: NORMAL
GLOBULIN SER CALC-MCNC: 2.6 G/DL (ref 1.9–3.5)
GLUCOSE SERPL-MCNC: 83 MG/DL (ref 65–99)
HCT VFR BLD AUTO: 39.2 % (ref 37–47)
HDLC SERPL-MCNC: 74 MG/DL
HGB BLD-MCNC: 12.6 G/DL (ref 12–16)
IMM GRANULOCYTES # BLD AUTO: 0.05 K/UL (ref 0–0.11)
IMM GRANULOCYTES NFR BLD AUTO: 0.6 % (ref 0–0.9)
LDLC SERPL CALC-MCNC: 121 MG/DL
LYMPHOCYTES # BLD AUTO: 2.23 K/UL (ref 1–4.8)
LYMPHOCYTES NFR BLD: 28.4 % (ref 22–41)
MCH RBC QN AUTO: 33.2 PG (ref 27–33)
MCHC RBC AUTO-ENTMCNC: 32.1 G/DL (ref 33.6–35)
MCV RBC AUTO: 103.4 FL (ref 81.4–97.8)
MONOCYTES # BLD AUTO: 0.78 K/UL (ref 0–0.85)
MONOCYTES NFR BLD AUTO: 9.9 % (ref 0–13.4)
NEUTROPHILS # BLD AUTO: 4.73 K/UL (ref 2–7.15)
NEUTROPHILS NFR BLD: 60.4 % (ref 44–72)
NRBC # BLD AUTO: 0 K/UL
NRBC BLD-RTO: 0 /100 WBC
PLATELET # BLD AUTO: 338 K/UL (ref 164–446)
PMV BLD AUTO: 9.7 FL (ref 9–12.9)
POTASSIUM SERPL-SCNC: 3.8 MMOL/L (ref 3.6–5.5)
PROT SERPL-MCNC: 7 G/DL (ref 6–8.2)
RBC # BLD AUTO: 3.79 M/UL (ref 4.2–5.4)
SODIUM SERPL-SCNC: 136 MMOL/L (ref 135–145)
TRIGL SERPL-MCNC: 153 MG/DL (ref 0–149)
TSH SERPL DL<=0.005 MIU/L-ACNC: 0.53 UIU/ML (ref 0.38–5.33)
WBC # BLD AUTO: 7.8 K/UL (ref 4.8–10.8)

## 2020-09-14 PROCEDURE — 36415 COLL VENOUS BLD VENIPUNCTURE: CPT

## 2020-09-14 PROCEDURE — 80053 COMPREHEN METABOLIC PANEL: CPT

## 2020-09-14 PROCEDURE — 80061 LIPID PANEL: CPT

## 2020-09-14 PROCEDURE — 85025 COMPLETE CBC W/AUTO DIFF WBC: CPT

## 2020-09-14 PROCEDURE — 84443 ASSAY THYROID STIM HORMONE: CPT

## 2020-09-16 ENCOUNTER — HOSPITAL ENCOUNTER (OUTPATIENT)
Facility: MEDICAL CENTER | Age: 70
End: 2020-09-16
Attending: FAMILY MEDICINE
Payer: MEDICARE

## 2020-09-16 ENCOUNTER — OFFICE VISIT (OUTPATIENT)
Dept: MEDICAL GROUP | Facility: PHYSICIAN GROUP | Age: 70
End: 2020-09-16
Payer: MEDICARE

## 2020-09-16 VITALS
DIASTOLIC BLOOD PRESSURE: 80 MMHG | OXYGEN SATURATION: 97 % | HEART RATE: 62 BPM | BODY MASS INDEX: 23.95 KG/M2 | WEIGHT: 149.03 LBS | SYSTOLIC BLOOD PRESSURE: 130 MMHG | HEIGHT: 66 IN | TEMPERATURE: 98 F

## 2020-09-16 DIAGNOSIS — M54.41 CHRONIC BILATERAL LOW BACK PAIN WITH BILATERAL SCIATICA: ICD-10-CM

## 2020-09-16 DIAGNOSIS — M54.42 CHRONIC BILATERAL LOW BACK PAIN WITH BILATERAL SCIATICA: ICD-10-CM

## 2020-09-16 DIAGNOSIS — E78.5 DYSLIPIDEMIA: ICD-10-CM

## 2020-09-16 DIAGNOSIS — G89.29 CHRONIC BILATERAL LOW BACK PAIN WITH BILATERAL SCIATICA: ICD-10-CM

## 2020-09-16 DIAGNOSIS — F11.20 OPIOID DEPENDENCE, UNCOMPLICATED (HCC): ICD-10-CM

## 2020-09-16 DIAGNOSIS — Z79.891 CHRONIC USE OF OPIATE FOR THERAPEUTIC PURPOSE: ICD-10-CM

## 2020-09-16 DIAGNOSIS — Z23 NEED FOR SHINGLES VACCINE: ICD-10-CM

## 2020-09-16 DIAGNOSIS — E89.0 POSTSURGICAL HYPOTHYROIDISM: ICD-10-CM

## 2020-09-16 DIAGNOSIS — I10 ESSENTIAL HYPERTENSION: ICD-10-CM

## 2020-09-16 PROCEDURE — 99214 OFFICE O/P EST MOD 30 MIN: CPT | Mod: 25 | Performed by: FAMILY MEDICINE

## 2020-09-16 PROCEDURE — 90471 IMMUNIZATION ADMIN: CPT | Performed by: FAMILY MEDICINE

## 2020-09-16 PROCEDURE — 80307 DRUG TEST PRSMV CHEM ANLYZR: CPT

## 2020-09-16 PROCEDURE — 90750 HZV VACC RECOMBINANT IM: CPT | Performed by: FAMILY MEDICINE

## 2020-09-16 RX ORDER — HYDROCODONE BITARTRATE AND ACETAMINOPHEN 5; 325 MG/1; MG/1
1 TABLET ORAL EVERY 8 HOURS PRN
Qty: 100 TAB | Refills: 0 | Status: SHIPPED | OUTPATIENT
Start: 2020-09-16 | End: 2021-04-29 | Stop reason: SDUPTHER

## 2020-09-16 RX ORDER — HYDROCODONE BITARTRATE AND ACETAMINOPHEN 5; 325 MG/1; MG/1
1 TABLET ORAL EVERY 4 HOURS PRN
COMMUNITY
End: 2020-10-13

## 2020-09-16 ASSESSMENT — FIBROSIS 4 INDEX: FIB4 SCORE: 0.93

## 2020-09-16 NOTE — PROGRESS NOTES
Subjective:      Argelia Jimenez is a 70 y.o. female who presents with Follow-Up and Injections (Shingrix)            HPI     Patient is here for chronic pain recheck and follow-up of her medical problems.    For her chronic bilateral low back pain with bilateral sciatica due to multilevel DDD, anterolisthesis, spondylosis, central canal stenosis, she continues to take hydrocodone/APAP 5/325 at least 1 tablet a day as needed.  Number 100 tablets last 4 to 6 months.  She is now under the care of Dr. Thomas, PMR for this problem and she had an epidural steroid shot 5 days ago which she said has worked.  Prior to this she had trigger point injections which did not work.  She tried physical therapy initially which she said did not help.  She is also on meloxicam and gabapentin.    Chronic pain recheck for: Chronic bilateral low back pain with sciatica  Last dose of controlled substance: This morning  Chronic pain treated with hydrocodone/APAP 5/325 1 tablet daily as needed    She  reports current alcohol use.  She  reports no history of drug use.    Interval history:   Any major change in health since last appointment? No    Consequences of Chronic Opiate therapy:  (5 A's)  Analgesia: Compared to no treatment or prior treatment, pain is currently improved  Activity: improved  Adverse Events: She denies constipation, dry mouth, itchy skin, nausea and sedation  Aberrant Behaviors: She reports she is taking medication as prescribed and is not veering from agreed treatment regimen or provider recommendations. There have been no inappropriate refills or lost/stolen meds reported.  Affect/Mood: Pain is not impacting patient's mood.  Patient denies depression/anxiety.    Nonnarcotic treatments that are being used: NSAIDs/RICK-2, gabapentin    Last imaging: X-ray of the lumbar spine 12/18, MRI lumbar spine 12/18    Opioid Risk Score: 1 (alcohol abuse Brother)    Interpretation of Opioid Risk Score   Score 0-3 = Low risk of  "abuse. Do UDS at least once per year.  Score 4-7 = Moderate risk of abuse. Do UDS 1-4 times per year.  Score 8+ = High risk of abuse. Refer to specialist.    Last order of CONTROLLED SUBSTANCE TREATMENT AGREEMENT was found on 9/16/2020 from Office Visit on 9/16/2020     UDS Summary                URINE DRUG SCREEN Overdue 12/8/2019      Done 12/13/2018 [facility] PAIN MANAGEMENT SCREEN     Patient has more history with this topic...        Most recent UDS reviewed today and is consistent with prescribed medications.     I have reviewed the medical records, the Prescription Monitoring Program and I have determined that controlled substance treatment is medically indicated.     We also follow her for postsurgical hypothyroidism due to thyroid cancer.  She continues to take thyroid replacement.    In terms of her dyslipidemia, she is managing this with her own efforts only.    Her blood pressure is under control on metoprolol.    She is due for second dose of Shingrix.  We do not have flu shot available and she will get this in October.    Past medical history, past surgical history, family history reviewed-no changes    Social history reviewed-no changes    Allergies reviewed-no changes    Medications reviewed-no changes    ROS     Per HPI, the rest are negative.       Objective:     /80   Pulse 62   Temp 36.7 °C (98 °F) (Temporal)   Ht 1.676 m (5' 6\")   Wt 67.6 kg (149 lb 0.5 oz)   SpO2 97%   BMI 24.05 kg/m²      Physical Exam     Examined alert, awake, oriented, not in distress    Neck-supple, no lymphadenopathy, no thyromegaly  Lungs-clear to auscultation, no rales, no wheezes  Heart-regular rate and rhythm, no murmur  Extremities-no edema, clubbing, cyanosis  Back exam-no pinpoint tenderness spinous processes lumbosacral spine, no spasms of the paraspinal muscles lumbosacral region  Neuro exam-motor strength 5/5 lower extremity flexors and extensors, DTRs 2+ lower extremities, sensation intact to light " [facility] Outpatient Visit on 09/14/2020   Component Date Value Ref Range Status   • Cholesterol,Tot 09/14/2020 226* 100 - 199 mg/dL Final   • Triglycerides 09/14/2020 153* 0 - 149 mg/dL Final   • HDL 09/14/2020 74  >=40 mg/dL Final   • LDL 09/14/2020 121* <100 mg/dL Final   • Sodium 09/14/2020 136  135 - 145 mmol/L Final   • Potassium 09/14/2020 3.8  3.6 - 5.5 mmol/L Final   • Chloride 09/14/2020 98  96 - 112 mmol/L Final   • Co2 09/14/2020 24  20 - 33 mmol/L Final   • Anion Gap 09/14/2020 14.0  7.0 - 16.0 Final   • Glucose 09/14/2020 83  65 - 99 mg/dL Final   • Bun 09/14/2020 15  8 - 22 mg/dL Final   • Creatinine 09/14/2020 0.80  0.50 - 1.40 mg/dL Final   • Calcium 09/14/2020 9.8  8.5 - 10.5 mg/dL Final   • AST(SGOT) 09/14/2020 19  12 - 45 U/L Final   • ALT(SGPT) 09/14/2020 18  2 - 50 U/L Final   • Alkaline Phosphatase 09/14/2020 66  30 - 99 U/L Final   • Total Bilirubin 09/14/2020 0.3  0.1 - 1.5 mg/dL Final   • Albumin 09/14/2020 4.4  3.2 - 4.9 g/dL Final   • Total Protein 09/14/2020 7.0  6.0 - 8.2 g/dL Final   • Globulin 09/14/2020 2.6  1.9 - 3.5 g/dL Final   • A-G Ratio 09/14/2020 1.7  g/dL Final   • TSH 09/14/2020 0.533  0.380 - 5.330 uIU/mL Final    Comment: Please note new reference ranges effective 12/14/2017 10:00 AM  Pregnant Females, 1st Trimester  0.050-3.700  Pregnant Females, 2nd Trimester  0.310-4.350  Pregnant Females, 3rd Trimester  0.410-5.180     • WBC 09/14/2020 7.8  4.8 - 10.8 K/uL Final   • RBC 09/14/2020 3.79* 4.20 - 5.40 M/uL Final   • Hemoglobin 09/14/2020 12.6  12.0 - 16.0 g/dL Final   • Hematocrit 09/14/2020 39.2  37.0 - 47.0 % Final   • MCV 09/14/2020 103.4* 81.4 - 97.8 fL Final   • MCH 09/14/2020 33.2* 27.0 - 33.0 pg Final   • MCHC 09/14/2020 32.1* 33.6 - 35.0 g/dL Final   • RDW 09/14/2020 48.5  35.9 - 50.0 fL Final   • Platelet Count 09/14/2020 338  164 - 446 K/uL Final   • MPV 09/14/2020 9.7  9.0 - 12.9 fL Final   • Neutrophils-Polys 09/14/2020 60.40  44.00 -  72.00 % Final   • Lymphocytes 09/14/2020 28.40  22.00 - 41.00 % Final   • Monocytes 09/14/2020 9.90  0.00 - 13.40 % Final   • Eosinophils 09/14/2020 0.30  0.00 - 6.90 % Final   • Basophils 09/14/2020 0.40  0.00 - 1.80 % Final   • Immature Granulocytes 09/14/2020 0.60  0.00 - 0.90 % Final   • Nucleated RBC 09/14/2020 0.00  /100 WBC Final   • Neutrophils (Absolute) 09/14/2020 4.73  2.00 - 7.15 K/uL Final    Includes immature neutrophils, if present.   • Lymphs (Absolute) 09/14/2020 2.23  1.00 - 4.80 K/uL Final   • Monos (Absolute) 09/14/2020 0.78  0.00 - 0.85 K/uL Final   • Eos (Absolute) 09/14/2020 0.02  0.00 - 0.51 K/uL Final   • Baso (Absolute) 09/14/2020 0.03  0.00 - 0.12 K/uL Final   • Immature Granulocytes (abs) 09/14/2020 0.05  0.00 - 0.11 K/uL Final   • NRBC (Absolute) 09/14/2020 0.00  K/uL Final   • Fasting Status 09/14/2020 Fasting   Final   • GFR If  09/14/2020 >60  >60 mL/min/1.73 m 2 Final   • GFR If Non  09/14/2020 >60  >60 mL/min/1.73 m 2 Final               The 10-year ASCVD risk score (Sudarshan DC Jr., et al., 2013) is: 12.7%  Assessment/Plan:        1. Chronic bilateral low back pain with bilateral sciatica  Continue current management with pain medication.  I sent a refill of her pain medication electronically to the pharmacy.  Number 100 tablets lasted more than 4 months.  We updated consent for opiate prescription and controlled substance treatment agreement.  We did urine drug screen today.  Continue follow-up with Dr. Thomas, PMR.  She had good results with most recent epidural shot.I have reviewed the medical records, the prescription monitoring program and I have determined that the controlled prescription medication is medically indicated. I have advised the patient to keep the medication in a safe place and not to drive while taking the medication.  - Pain Management Screen; Future  - HYDROcodone-acetaminophen (NORCO) 5-325 MG Tab per tablet; Take 1 Tab by mouth  every 8 hours as needed for up to 90 days.  Dispense: 100 Tab; Refill: 0  - Consent for Opiate Prescription  - Controlled Substance Treatment Agreement    2. Chronic use of opiate for therapeutic purpose  Same as #1.  - Pain Management Screen; Future  - HYDROcodone-acetaminophen (NORCO) 5-325 MG Tab per tablet; Take 1 Tab by mouth every 8 hours as needed for up to 90 days.  Dispense: 100 Tab; Refill: 0  - Consent for Opiate Prescription  - Controlled Substance Treatment Agreement    3. Opioid dependence, uncomplicated (HCC)  Same as #1.  - Pain Management Screen; Future  - HYDROcodone-acetaminophen (NORCO) 5-325 MG Tab per tablet; Take 1 Tab by mouth every 8 hours as needed for up to 90 days.  Dispense: 100 Tab; Refill: 0  - Consent for Opiate Prescription  - Controlled Substance Treatment Agreement    4. Postsurgical hypothyroidism  Adequately replaced.  Continue the same dose of levothyroxine.    5. Dyslipidemia  There is worsening of her LDL cholesterol.  10-year ASCVD risk is at 12.7%.  We discussed treatment with statin, potential side effects.  She wants to hold off on statin and she will work harder on avoidance of fatty foods, eating more fruits and vegetables, eating fish 3 times a week, regular exercises and keeping a healthy weight.  We will follow-up blood work in 4 months.    6. Essential hypertension  Controlled on metoprolol.    7. Need for shingles vaccine  Second dose of Shingrix given.  - Shingles Vaccine (Shingrix)      Please note that this dictation was created using voice recognition software. I have worked with consultants from the vendor as well as technical experts from [facility]  [employer] to optimize the interface. I have made every reasonable attempt to correct obvious errors, but I expect that there are errors of grammar and possibly content I did not discover before finalizing the note.

## 2020-09-18 ENCOUNTER — PATIENT OUTREACH (OUTPATIENT)
Dept: HEALTH INFORMATION MANAGEMENT | Facility: OTHER | Age: 70
End: 2020-09-18

## 2020-09-18 NOTE — PROGRESS NOTES
Member called in to see if out patient hospital services are covered. I advised that per her benefits she has a $275 copayment for outpatient hospital services at a preferred facility and $440 at a non preferred facility. She said the surgeons office was not sure if her exact procedure would be covered. I advised her that the office can call either our provider line or submit prior auth. for the services. The member understood and had no further questions.

## 2020-09-21 LAB
6MAM UR QL: NOT DETECTED
7AMINOCLONAZEPAM UR QL: NOT DETECTED
A-OH ALPRAZ UR QL: NOT DETECTED
ALPRAZ UR QL: NOT DETECTED
AMPHET UR QL SCN: NOT DETECTED
ANNOTATION COMMENT IMP: NORMAL
ANNOTATION COMMENT IMP: NORMAL
BARBITURATES UR QL: NOT DETECTED
BUPRENORPHINE UR QL: NOT DETECTED
BZE UR QL: NOT DETECTED
CARBOXYTHC UR QL: NOT DETECTED
CARISOPRODOL UR QL: NOT DETECTED
CLONAZEPAM UR QL: NOT DETECTED
CODEINE UR QL: NOT DETECTED
DIAZEPAM UR QL: NOT DETECTED
ETHYL GLUCURONIDE UR QL: PRESENT
FENTANYL UR QL: NOT DETECTED
HYDROCODONE UR QL: PRESENT
HYDROMORPHONE UR QL: NOT DETECTED
LORAZEPAM UR QL: NOT DETECTED
MDA UR QL: NOT DETECTED
MDEA UR QL: NOT DETECTED
MDMA UR QL: NOT DETECTED
MEPERIDINE UR QL: NOT DETECTED
METHADONE UR QL: NOT DETECTED
METHAMPHET UR QL: NOT DETECTED
MIDAZOLAM UR QL SCN: NOT DETECTED
MORPHINE UR QL: NOT DETECTED
NORBUPRENORPHINE UR QL CFM: NOT DETECTED
NORDIAZEPAM UR QL: NOT DETECTED
NORFENTANYL UR QL: NOT DETECTED
NORHYDROCODONE UR QL CFM: NOT DETECTED
NOROXYCODONE UR QL CFM: NOT DETECTED
NOROXYMORPH CO100 Q0458: NOT DETECTED
OXAZEPAM UR QL: NOT DETECTED
OXYCODONE UR QL: NOT DETECTED
OXYMORPHONE UR QL: NOT DETECTED
PATHOLOGY STUDY: NORMAL
PCP UR QL: NOT DETECTED
PHENTERMINE UR QL: NOT DETECTED
PPAA UR QL: NOT DETECTED
PROPOXYPH UR QL: NOT DETECTED
SERVICE CMNT-IMP: NORMAL
TAPENADOL OSULF CO200 Q0473: NOT DETECTED
TAPENTADOL UR QL SCN: NOT DETECTED
TEMAZEPAM UR QL: NOT DETECTED
TRAMADOL UR QL: NOT DETECTED
ZOLPIDEM UR QL: NOT DETECTED

## 2020-09-28 RX ORDER — LEVOTHYROXINE SODIUM 88 UG/1
TABLET ORAL
Qty: 100 TAB | Refills: 1 | Status: SHIPPED | OUTPATIENT
Start: 2020-09-28 | End: 2021-03-23

## 2020-09-28 RX ORDER — METOPROLOL SUCCINATE 25 MG/1
TABLET, EXTENDED RELEASE ORAL
Qty: 100 TAB | Refills: 1 | Status: SHIPPED | OUTPATIENT
Start: 2020-09-28 | End: 2021-03-23

## 2020-10-02 ENCOUNTER — HOSPITAL ENCOUNTER (OUTPATIENT)
Dept: RADIOLOGY | Facility: MEDICAL CENTER | Age: 70
End: 2020-10-02
Attending: FAMILY MEDICINE
Payer: MEDICARE

## 2020-10-02 DIAGNOSIS — Z12.31 VISIT FOR SCREENING MAMMOGRAM: ICD-10-CM

## 2020-10-02 PROCEDURE — 77067 SCR MAMMO BI INCL CAD: CPT

## 2020-10-03 DIAGNOSIS — R92.8 ABNORMAL MAMMOGRAM OF LEFT BREAST: ICD-10-CM

## 2020-10-06 ENCOUNTER — NON-PROVIDER VISIT (OUTPATIENT)
Dept: MEDICAL GROUP | Facility: PHYSICIAN GROUP | Age: 70
End: 2020-10-06
Payer: MEDICARE

## 2020-10-06 DIAGNOSIS — Z23 NEED FOR VACCINATION: ICD-10-CM

## 2020-10-06 PROCEDURE — 90662 IIV NO PRSV INCREASED AG IM: CPT | Performed by: FAMILY MEDICINE

## 2020-10-06 PROCEDURE — G0008 ADMIN INFLUENZA VIRUS VAC: HCPCS | Performed by: FAMILY MEDICINE

## 2020-10-06 NOTE — PROGRESS NOTES
"Argelia Jimenez is a 70 y.o. female here for a non-provider visit for:   FLU    Reason for immunization: Annual Flu Vaccine  Immunization records indicate need for vaccine: Yes, confirmed with Epic  Minimum interval has been met for this vaccine: Yes  ABN completed: Yes    Order and dose verified by: Charmaine RODRIGUEZ Dated  8/15/2019 was given to patient: Yes  All IAC Questionnaire questions were answered \"No.\"    Patient tolerated injection and no adverse effects were observed or reported: Yes    Pt scheduled for next dose in series: Not Indicated    "

## 2020-10-12 ENCOUNTER — PATIENT OUTREACH (OUTPATIENT)
Dept: HEALTH INFORMATION MANAGEMENT | Facility: OTHER | Age: 70
End: 2020-10-12

## 2020-10-12 NOTE — PROGRESS NOTES
Member called in to see what her copay would be for an urgent care visit. Verified HIPPA. Advised member that she has a $30 copay at a preferred Urgent Care facility. Advised that [facility] would be a preferred facility. She asked if that would be for the same as next year. I advised that for the [facility] and [facility] plan it would be. She had no other questions.

## 2020-10-13 ENCOUNTER — HOSPITAL ENCOUNTER (OUTPATIENT)
Facility: MEDICAL CENTER | Age: 70
End: 2020-10-13
Attending: FAMILY MEDICINE
Payer: MEDICARE

## 2020-10-13 ENCOUNTER — OFFICE VISIT (OUTPATIENT)
Dept: MEDICAL GROUP | Facility: PHYSICIAN GROUP | Age: 70
End: 2020-10-13
Payer: MEDICARE

## 2020-10-13 VITALS
DIASTOLIC BLOOD PRESSURE: 84 MMHG | TEMPERATURE: 97.4 F | SYSTOLIC BLOOD PRESSURE: 130 MMHG | HEART RATE: 77 BPM | HEIGHT: 66 IN | OXYGEN SATURATION: 96 % | WEIGHT: 148.15 LBS | BODY MASS INDEX: 23.81 KG/M2

## 2020-10-13 DIAGNOSIS — N39.0 URINARY TRACT INFECTION WITHOUT HEMATURIA, SITE UNSPECIFIED: ICD-10-CM

## 2020-10-13 LAB
AMBIGUOUS DTTM AMBI4: NORMAL
APPEARANCE UR: NORMAL
BILIRUB UR STRIP-MCNC: NORMAL MG/DL
COLOR UR AUTO: NORMAL
GLUCOSE UR STRIP.AUTO-MCNC: NORMAL MG/DL
KETONES UR STRIP.AUTO-MCNC: NORMAL MG/DL
LEUKOCYTE ESTERASE UR QL STRIP.AUTO: NORMAL
NITRITE UR QL STRIP.AUTO: NORMAL
PH UR STRIP.AUTO: 5.5 [PH] (ref 5–8)
PROT UR QL STRIP: 30 MG/DL
RBC UR QL AUTO: NORMAL
SP GR UR STRIP.AUTO: 1.02
UROBILINOGEN UR STRIP-MCNC: 0.2 MG/DL

## 2020-10-13 PROCEDURE — 87186 SC STD MICRODIL/AGAR DIL: CPT

## 2020-10-13 PROCEDURE — 87077 CULTURE AEROBIC IDENTIFY: CPT

## 2020-10-13 PROCEDURE — 87086 URINE CULTURE/COLONY COUNT: CPT

## 2020-10-13 PROCEDURE — 81002 URINALYSIS NONAUTO W/O SCOPE: CPT | Performed by: FAMILY MEDICINE

## 2020-10-13 PROCEDURE — 99214 OFFICE O/P EST MOD 30 MIN: CPT | Performed by: FAMILY MEDICINE

## 2020-10-13 PROCEDURE — 99000 SPECIMEN HANDLING OFFICE-LAB: CPT | Performed by: FAMILY MEDICINE

## 2020-10-13 RX ORDER — NITROFURANTOIN 25; 75 MG/1; MG/1
100 CAPSULE ORAL 2 TIMES DAILY
Qty: 10 CAP | Refills: 0 | Status: SHIPPED | OUTPATIENT
Start: 2020-10-13 | End: 2021-02-25

## 2020-10-13 ASSESSMENT — FIBROSIS 4 INDEX: FIB4 SCORE: 0.93

## 2020-10-13 NOTE — PROGRESS NOTES
"Subjective:      Argelia Sevilla is a 70 y.o. female who presents with Cystitis            HPI     Patient is here complaining of UTI symptoms since 3 days ago.  She said she has urgency, mild dysuria and feeling feverish at night.  She said the urine looks cloudy.  Denies any blood in the urine.  She has history of chronic low back pain and she has felt the pain has increased recently.  Denies any nausea or vomiting.  She had UTI confirmed culture which grew out E. coli in April 2020 which we treated successfully with Macrobid.    Past medical history, past surgical history, family history reviewed-no changes    Social history reviewed-no changes    Allergies reviewed-no changes    Medications reviewed-no changes    ROS     Per HPI, the rest are negative.       Objective:     /84   Pulse 77   Temp 36.3 °C (97.4 °F) (Temporal)   Ht 1.676 m (5' 6\")   Wt 67.2 kg (148 lb 2.4 oz)   SpO2 96%   BMI 23.91 kg/m²      Physical Exam     Examined alert, awake, oriented, not in distress    Neck-supple, no lymphadenopathy, no thyromegaly  Lungs-clear to auscultation, no rales, no wheezes  Heart-regular rate and rhythm, no murmur  Abdomen has good bowel sounds, soft, nontender, no hepatosplenomegaly, no masses, no CVA tenderness  Extremities-no edema, clubbing, cyanosis       Urine dipstick    Results for ARGELIA SEVILLA (MRN [id number]) as of 10/13/2020 09:02   Ref. Range 10/13/2020 08:47   POC Color Latest Ref Range: Negative  yl   POC Appearance Latest Ref Range: Negative  cloudy   POC Specific Gravity Latest Ref Range: <1.005 - >1.030  1.020   POC Urine PH Latest Ref Range: 5.0 - 8.0  5.5   POC Glucose Latest Ref Range: Negative mg/dL neg   POC Ketones Latest Ref Range: Negative mg/dL neg   POC Protein Latest Ref Range: Negative mg/dL 30   POC Nitrites Latest Ref Range: Negative  pos   POC Leukocyte Esterase Latest Ref Range: Negative  lrg   POC Blood Latest Ref Range: Negative  Mod   POC Bilirubin Latest " Ref Range: Negative mg/dL neg   POC Urobiligen Latest Ref Range: Negative (0.2) mg/dL 0.2        Assessment/Plan:        1. Urinary tract infection without hematuria, site unspecified  Symptoms and urine dipstick consistent with UTI.  I will treat with Macrobid pending culture results.  Advised increase p.o. fluids.  I will get back with her with results of the culture.  - POCT Urinalysis  - URINE CULTURE(NEW); Future  - nitrofurantoin (MACROBID) 100 MG Cap; Take 1 Cap by mouth 2 times a day.  Dispense: 10 Cap; Refill: 0    Keep appointment scheduled in January 2021 or return sooner if needed.      Please note that this dictation was created using voice recognition software. I have worked with consultants from the vendor as well as technical experts from [facility] to optimize the interface. I have made every reasonable attempt to correct obvious errors, but I expect that there are errors of grammar and possibly content I did not discover before finalizing the note.

## 2020-10-14 ENCOUNTER — HOSPITAL ENCOUNTER (OUTPATIENT)
Dept: RADIOLOGY | Facility: MEDICAL CENTER | Age: 70
End: 2020-10-14
Attending: FAMILY MEDICINE
Payer: MEDICARE

## 2020-10-14 DIAGNOSIS — R92.8 ABNORMAL MAMMOGRAM OF LEFT BREAST: ICD-10-CM

## 2020-10-14 DIAGNOSIS — R92.8 ABNORMAL FINDING ON BREAST IMAGING: ICD-10-CM

## 2020-10-14 PROCEDURE — 76642 ULTRASOUND BREAST LIMITED: CPT | Mod: LT

## 2020-10-14 PROCEDURE — G0279 TOMOSYNTHESIS, MAMMO: HCPCS | Mod: LT

## 2020-11-06 ENCOUNTER — PATIENT MESSAGE (OUTPATIENT)
Dept: MEDICAL GROUP | Facility: PHYSICIAN GROUP | Age: 70
End: 2020-11-06

## 2020-11-06 ENCOUNTER — OFFICE VISIT (OUTPATIENT)
Dept: MEDICAL GROUP | Facility: PHYSICIAN GROUP | Age: 70
End: 2020-11-06
Payer: MEDICARE

## 2020-11-06 ENCOUNTER — HOSPITAL ENCOUNTER (OUTPATIENT)
Facility: MEDICAL CENTER | Age: 70
End: 2020-11-06
Attending: FAMILY MEDICINE
Payer: MEDICARE

## 2020-11-06 VITALS
RESPIRATION RATE: 16 BRPM | TEMPERATURE: 98.8 F | SYSTOLIC BLOOD PRESSURE: 152 MMHG | HEIGHT: 66 IN | OXYGEN SATURATION: 95 % | HEART RATE: 68 BPM | BODY MASS INDEX: 24.17 KG/M2 | WEIGHT: 150.4 LBS | DIASTOLIC BLOOD PRESSURE: 98 MMHG

## 2020-11-06 DIAGNOSIS — R39.9 UTI SYMPTOMS: ICD-10-CM

## 2020-11-06 LAB
APPEARANCE UR: NORMAL
BILIRUB UR STRIP-MCNC: NORMAL MG/DL
COLOR UR AUTO: YELLOW
GLUCOSE UR STRIP.AUTO-MCNC: NORMAL MG/DL
KETONES UR STRIP.AUTO-MCNC: NORMAL MG/DL
LEUKOCYTE ESTERASE UR QL STRIP.AUTO: NORMAL
NITRITE UR QL STRIP.AUTO: NORMAL
PH UR STRIP.AUTO: 7 [PH] (ref 5–8)
PROT UR QL STRIP: NORMAL MG/DL
RBC UR QL AUTO: NORMAL
SP GR UR STRIP.AUTO: 1.01
UROBILINOGEN UR STRIP-MCNC: 0.2 MG/DL

## 2020-11-06 PROCEDURE — 81002 URINALYSIS NONAUTO W/O SCOPE: CPT | Performed by: FAMILY MEDICINE

## 2020-11-06 PROCEDURE — 87086 URINE CULTURE/COLONY COUNT: CPT

## 2020-11-06 PROCEDURE — 87077 CULTURE AEROBIC IDENTIFY: CPT

## 2020-11-06 PROCEDURE — 87186 SC STD MICRODIL/AGAR DIL: CPT

## 2020-11-06 PROCEDURE — 99214 OFFICE O/P EST MOD 30 MIN: CPT | Performed by: FAMILY MEDICINE

## 2020-11-06 RX ORDER — SULFAMETHOXAZOLE AND TRIMETHOPRIM 800; 160 MG/1; MG/1
1 TABLET ORAL 2 TIMES DAILY
Qty: 6 TAB | Refills: 0 | Status: SHIPPED | OUTPATIENT
Start: 2020-11-06 | End: 2020-11-10 | Stop reason: SDUPTHER

## 2020-11-06 RX ORDER — ESTRADIOL 0.1 MG/G
CREAM VAGINAL
Qty: 42.5 G | Refills: 0 | Status: SHIPPED | OUTPATIENT
Start: 2020-11-06 | End: 2022-04-12 | Stop reason: CLARIF

## 2020-11-06 RX ORDER — CONJUGATED ESTROGENS 0.62 MG/G
CREAM VAGINAL
Qty: 30 G | Refills: 3 | Status: SHIPPED | OUTPATIENT
Start: 2020-11-06 | End: 2020-11-06

## 2020-11-06 RX ORDER — NITROFURANTOIN 25; 75 MG/1; MG/1
100 CAPSULE ORAL 2 TIMES DAILY
Qty: 10 CAP | Refills: 0 | Status: CANCELLED | OUTPATIENT
Start: 2020-11-06 | End: 2020-11-11

## 2020-11-06 ASSESSMENT — FIBROSIS 4 INDEX: FIB4 SCORE: 0.93

## 2020-11-06 NOTE — ASSESSMENT & PLAN NOTE
This is a new condition.    Symptom onset:  Began to develop UTI symptoms two days ago  Current symptoms: UA in the office today is positive for large leukocytes and large amount of microscopic blood.  Since onset symptoms are: Unchanged  Modifying factors: Last UTI was 1 month ago and positive for pansensitive E. coli properly treated with Macrobid but she feels as though the infection never went away.   Treatments tried: Macrobid  Associated symptoms: endorses vaginal dryness.

## 2020-11-06 NOTE — PROGRESS NOTES
Subjective:     Chief Complaint   Patient presents with   • UTI     continuing after antibx       HPI:   Argelia presents today to discuss the following.  PCP: Dr. Pineda    UTI symptoms  This is a new condition.    Symptom onset:  Began to develop UTI symptoms two days ago  Current symptoms: UA in the office today is positive for large leukocytes and large amount of microscopic blood.  Since onset symptoms are: Unchanged  Modifying factors: Last UTI was 1 month ago and positive for pansensitive E. coli properly treated with Macrobid but she feels as though the infection never went away.   Treatments tried: Macrobid  Associated symptoms: endorses vaginal dryness.        Past Medical History:   Diagnosis Date   • Chronic bilateral low back pain with bilateral sciatica    • Chronic use of opiate for therapeutic purpose    • GERD (gastroesophageal reflux disease)    • History of thyroid cancer    • Insomnia    • PAC (premature atrial contraction)    • Postsurgical hypothyroidism    • PSVT (paroxysmal supraventricular tachycardia) ([facility])        Social History     Tobacco Use   • Smoking status: Never Smoker   • Smokeless tobacco: Never Used   Substance Use Topics   • Alcohol use: Yes     Comment: 2 beers, 2-3 vodka/week   • Drug use: No       Current Outpatient Medications Ordered in Epic   Medication Sig Dispense Refill   • sulfamethoxazole-trimethoprim (BACTRIM DS) 800-160 MG tablet Take 1 Tab by mouth 2 times a day for 3 days. 6 Tab 0   • estrogens, conjugated (PREMARIN) 0.625 MG/GM Cream Apply 0.5 grams vaginally for 21 days and then 7 days off. 30 g 3   • metoprolol SR (TOPROL XL) 25 MG TABLET SR 24 HR Take 1 tablet by mouth once daily 100 Tab 1   • levothyroxine (SYNTHROID) 88 MCG Tab TAKE 1 TABLET BY MOUTH ONCE DAILY IN THE MORNING ON AN EMPTY STOMACH 100 Tab 1   • meloxicam (MOBIC) 15 MG tablet Take 1 tablet by mouth once daily 30 Tab 5   • HYDROcodone-acetaminophen (NORCO) 5-325 MG Tab per tablet Take 1 Tab by  "mouth every 8 hours as needed for up to 90 days. 100 Tab 0   • omeprazole (PRILOSEC) 40 MG delayed-release capsule Take 1 capsule by mouth once daily 100 Cap 1   • gabapentin (NEURONTIN) 300 MG Cap Take 900 mg by mouth every day.     • Cyanocobalamin (VITAMIN B-12) 1000 MCG Tab Take 1,000 mcg by mouth every day.     • Omega-3 Fatty Acids (FISH OIL PO) Take  by mouth.     • Multiple Minerals-Vitamins (CALCIUM-MAGNESIUM-ZINC-D3) Tab Take  by mouth.     • BIOTIN PO Take  by mouth.     • CRANBERRY EXTRACT PO Take  by mouth.     • aspirin (ASA) 81 MG Chew Tab chewable tablet Take 81 mg by mouth every 48 hours.     • nitrofurantoin (MACROBID) 100 MG Cap Take 1 Cap by mouth 2 times a day. (Patient not taking: Reported on 11/6/2020) 10 Cap 0     No current Epic-ordered facility-administered medications on file.        Allergies:  Patient has no known allergies.    Health Maintenance: Completed    ROS:  Gen: no fevers/chills, no changes in weight  Eyes: no changes in vision  ENT: no sore throat, no hearing loss, no bloody nose  Pulm: no sob, no cough  CV: no chest pain, no palpitations      Objective:     Exam:  /98 (BP Location: Left arm, Patient Position: Sitting, BP Cuff Size: Adult)   Pulse 68   Temp 37.1 °C (98.8 °F) (Temporal)   Resp 16   Ht 1.676 m (5' 6\")   Wt 68.2 kg (150 lb 6.4 oz)   SpO2 95%   BMI 24.28 kg/m²  Body mass index is 24.28 kg/m².    Constitutional: Alert, no distress, well-groomed.  Skin: Warm, dry, good turgor, no rashes in visible areas.  Eye: Equal, round and reactive, conjunctiva clear, lids normal.  ENMT: Lips without lesions, good dentition, moist mucous membranes.  Neck: Trachea midline, no masses, no thyromegaly.  Respiratory: Unlabored respiratory effort, no cough.  MSK: Normal gait, moves all extremities.  Neuro: Grossly non-focal.   Psych: Alert and oriented x3, normal affect and mood.      Assessment & Plan:     70 y.o. female with the following -     1. UTI symptoms  This is a " new problem.  The patient has recurrent UTI symptoms of 2 days duration.  Last UTI was 1 month ago.  Positive for pansensitive E. coli.  Upon further questioning the patient does have a history of vaginal dryness suggestive of vaginal atrophy which could be predisposing her to UTIs.  She feels as though Macrobid did not help very much last time.  As such, we will do a trial of Bactrim for 3 days.  We will also start a trial of Premarin vaginal cream to see if this helps with her vaginal dryness and ultimately prevent subsequent UTIs.  She will further discuss this regimen with PCP.  - POCT Urinalysis  - URINE CULTURE(NEW); Future  - sulfamethoxazole-trimethoprim (BACTRIM DS) 800-160 MG tablet; Take 1 Tab by mouth 2 times a day for 3 days.  Dispense: 6 Tab; Refill: 0      Return if symptoms worsen or fail to improve.    Please note that this dictation was created using voice recognition software. I have made every reasonable attempt to correct obvious errors, but I expect that there are errors of grammar and possibly content that I did not discover before finalizing the note.

## 2020-11-10 DIAGNOSIS — R39.9 UTI SYMPTOMS: ICD-10-CM

## 2020-11-10 DIAGNOSIS — N39.0 RECURRENT UTI: ICD-10-CM

## 2020-11-10 RX ORDER — SULFAMETHOXAZOLE AND TRIMETHOPRIM 800; 160 MG/1; MG/1
1 TABLET ORAL 2 TIMES DAILY
Qty: 14 TAB | Refills: 0 | Status: SHIPPED | OUTPATIENT
Start: 2020-11-10 | End: 2020-11-17

## 2020-11-10 NOTE — PROGRESS NOTES
Thank you for seeing the patient.  I agree with the vaginal estrogen cream.  I gave her 7 more days of Bactrim DS for the recurrent UTI versus chronic UTI.  I also referred her to the urologist for further evaluation and treatment.     Cheryl

## 2020-11-23 ENCOUNTER — HOSPITAL ENCOUNTER (OUTPATIENT)
Dept: CARDIOLOGY | Facility: MEDICAL CENTER | Age: 70
End: 2020-11-23
Attending: ORTHOPAEDIC SURGERY
Payer: MEDICARE

## 2020-11-23 ENCOUNTER — TELEPHONE (OUTPATIENT)
Dept: MEDICAL GROUP | Facility: PHYSICIAN GROUP | Age: 70
End: 2020-11-23

## 2020-11-23 LAB — EKG IMPRESSION: NORMAL

## 2020-11-23 PROCEDURE — 93010 ELECTROCARDIOGRAM REPORT: CPT | Performed by: INTERNAL MEDICINE

## 2020-11-23 PROCEDURE — 93005 ELECTROCARDIOGRAM TRACING: CPT | Performed by: ANESTHESIOLOGY

## 2020-11-23 NOTE — TELEPHONE ENCOUNTER
Jaye called and left message  About needing a EKG  And if she needed to be seen .  Called and left message to see if she needed Surgical Clearance or just an EKG

## 2020-12-01 ENCOUNTER — HOSPITAL ENCOUNTER (OUTPATIENT)
Dept: LAB | Facility: MEDICAL CENTER | Age: 70
End: 2020-12-01
Attending: ANESTHESIOLOGY
Payer: MEDICARE

## 2020-12-01 PROCEDURE — C9803 HOPD COVID-19 SPEC COLLECT: HCPCS

## 2020-12-01 PROCEDURE — U0003 INFECTIOUS AGENT DETECTION BY NUCLEIC ACID (DNA OR RNA); SEVERE ACUTE RESPIRATORY SYNDROME CORONAVIRUS 2 (SARS-COV-2) (CORONAVIRUS DISEASE [COVID-19]), AMPLIFIED PROBE TECHNIQUE, MAKING USE OF HIGH THROUGHPUT TECHNOLOGIES AS DESCRIBED BY CMS-2020-01-R: HCPCS

## 2020-12-02 LAB — COVID ORDER STATUS COVID19: NORMAL

## 2020-12-03 LAB
SARS-COV-2 RNA RESP QL NAA+PROBE: NOTDETECTED
SPECIMEN SOURCE: NORMAL

## 2021-01-15 DIAGNOSIS — Z23 NEED FOR VACCINATION: ICD-10-CM

## 2021-02-11 ENCOUNTER — APPOINTMENT (RX ONLY)
Dept: URBAN - METROPOLITAN AREA CLINIC 20 | Facility: CLINIC | Age: 71
Setting detail: DERMATOLOGY
End: 2021-02-11

## 2021-02-11 DIAGNOSIS — L57.0 ACTINIC KERATOSIS: ICD-10-CM

## 2021-02-11 DIAGNOSIS — L82.0 INFLAMED SEBORRHEIC KERATOSIS: ICD-10-CM | Status: INADEQUATELY CONTROLLED

## 2021-02-11 DIAGNOSIS — L85.3 XEROSIS CUTIS: ICD-10-CM | Status: INADEQUATELY CONTROLLED

## 2021-02-11 DIAGNOSIS — D22 MELANOCYTIC NEVI: ICD-10-CM

## 2021-02-11 DIAGNOSIS — L82.1 OTHER SEBORRHEIC KERATOSIS: ICD-10-CM

## 2021-02-11 DIAGNOSIS — D18.0 HEMANGIOMA: ICD-10-CM

## 2021-02-11 DIAGNOSIS — L57.8 OTHER SKIN CHANGES DUE TO CHRONIC EXPOSURE TO NONIONIZING RADIATION: ICD-10-CM

## 2021-02-11 DIAGNOSIS — L81.4 OTHER MELANIN HYPERPIGMENTATION: ICD-10-CM

## 2021-02-11 PROBLEM — D48.5 NEOPLASM OF UNCERTAIN BEHAVIOR OF SKIN: Status: ACTIVE | Noted: 2021-02-11

## 2021-02-11 PROBLEM — D22.5 MELANOCYTIC NEVI OF TRUNK: Status: ACTIVE | Noted: 2021-02-11

## 2021-02-11 PROBLEM — D18.01 HEMANGIOMA OF SKIN AND SUBCUTANEOUS TISSUE: Status: ACTIVE | Noted: 2021-02-11

## 2021-02-11 PROCEDURE — 17000 DESTRUCT PREMALG LESION: CPT | Mod: 59

## 2021-02-11 PROCEDURE — ? COUNSELING

## 2021-02-11 PROCEDURE — 17110 DESTRUCTION B9 LES UP TO 14: CPT

## 2021-02-11 PROCEDURE — 11102 TANGNTL BX SKIN SINGLE LES: CPT | Mod: 59

## 2021-02-11 PROCEDURE — 99213 OFFICE O/P EST LOW 20 MIN: CPT | Mod: 25

## 2021-02-11 PROCEDURE — ? BIOPSY BY SHAVE METHOD

## 2021-02-11 PROCEDURE — ? PRESCRIPTION

## 2021-02-11 PROCEDURE — ? LIQUID NITROGEN

## 2021-02-11 RX ORDER — TRIAMCINOLONE ACETONIDE 1 MG/G
CREAM TOPICAL
Qty: 1 | Refills: 3 | Status: ERX | COMMUNITY
Start: 2021-02-11

## 2021-02-11 RX ADMIN — TRIAMCINOLONE ACETONIDE: 1 CREAM TOPICAL at 00:00

## 2021-02-11 ASSESSMENT — LOCATION DETAILED DESCRIPTION DERM
LOCATION DETAILED: RIGHT PROXIMAL DORSAL FOREARM
LOCATION DETAILED: LEFT PROXIMAL POSTERIOR UPPER ARM
LOCATION DETAILED: RIGHT INFERIOR UPPER BACK
LOCATION DETAILED: LEFT ANTERIOR DISTAL THIGH
LOCATION DETAILED: RIGHT ANTERIOR PROXIMAL UPPER ARM
LOCATION DETAILED: RIGHT SUPERIOR MEDIAL UPPER BACK
LOCATION DETAILED: RIGHT ANTERIOR DISTAL THIGH
LOCATION DETAILED: LEFT ANTERIOR PROXIMAL UPPER ARM
LOCATION DETAILED: LEFT INFERIOR CENTRAL MALAR CHEEK
LOCATION DETAILED: LEFT PROXIMAL DORSAL FOREARM
LOCATION DETAILED: LEFT MEDIAL SUPERIOR CHEST
LOCATION DETAILED: RIGHT MID-UPPER BACK
LOCATION DETAILED: XIPHOID
LOCATION DETAILED: RIGHT CENTRAL MALAR CHEEK
LOCATION DETAILED: RIGHT INFERIOR MEDIAL UPPER BACK
LOCATION DETAILED: LEFT PROXIMAL LATERAL POSTERIOR UPPER ARM

## 2021-02-11 ASSESSMENT — LOCATION SIMPLE DESCRIPTION DERM
LOCATION SIMPLE: LEFT POSTERIOR UPPER ARM
LOCATION SIMPLE: RIGHT THIGH
LOCATION SIMPLE: RIGHT FOREARM
LOCATION SIMPLE: LEFT THIGH
LOCATION SIMPLE: LEFT FOREARM
LOCATION SIMPLE: RIGHT CHEEK
LOCATION SIMPLE: ABDOMEN
LOCATION SIMPLE: RIGHT UPPER ARM
LOCATION SIMPLE: LEFT UPPER ARM
LOCATION SIMPLE: RIGHT UPPER BACK
LOCATION SIMPLE: LEFT CHEEK
LOCATION SIMPLE: CHEST

## 2021-02-11 ASSESSMENT — LOCATION ZONE DERM
LOCATION ZONE: ARM
LOCATION ZONE: FACE
LOCATION ZONE: LEG
LOCATION ZONE: TRUNK

## 2021-02-11 NOTE — PROCEDURE: MIPS QUALITY
Quality 431: Preventive Care And Screening: Unhealthy Alcohol Use - Screening: Patient screened for unhealthy alcohol use using a single question and scores less than 2 times per year
Detail Level: Detailed
Quality 111:Pneumonia Vaccination Status For Older Adults: Pneumococcal Vaccination Previously Received
Quality 402: Tobacco Use And Help With Quitting Among Adolescents: Patient screened for tobacco and never smoked
Quality 226: Preventive Care And Screening: Tobacco Use: Screening And Cessation Intervention: Patient screened for tobacco use and is an ex/non-smoker
Quality 130: Documentation Of Current Medications In The Medical Record: Current Medications Documented

## 2021-02-25 ENCOUNTER — HOSPITAL ENCOUNTER (OUTPATIENT)
Dept: RADIOLOGY | Facility: MEDICAL CENTER | Age: 71
End: 2021-02-25
Attending: NEUROLOGICAL SURGERY
Payer: MEDICARE

## 2021-02-25 ENCOUNTER — PRE-ADMISSION TESTING (OUTPATIENT)
Dept: ADMISSIONS | Facility: MEDICAL CENTER | Age: 71
End: 2021-02-25
Attending: NEUROLOGICAL SURGERY
Payer: MEDICARE

## 2021-02-25 DIAGNOSIS — Z01.812 PRE-OPERATIVE LABORATORY EXAMINATION: ICD-10-CM

## 2021-02-25 DIAGNOSIS — Z01.811 PRE-OPERATIVE RESPIRATORY EXAMINATION: ICD-10-CM

## 2021-02-25 LAB
ANION GAP SERPL CALC-SCNC: 10 MMOL/L (ref 7–16)
APPEARANCE UR: CLEAR
APTT PPP: 30 SEC (ref 24.7–36)
BASOPHILS # BLD AUTO: 1.2 % (ref 0–1.8)
BASOPHILS # BLD: 0.06 K/UL (ref 0–0.12)
BILIRUB UR QL STRIP.AUTO: NEGATIVE
BUN SERPL-MCNC: 15 MG/DL (ref 8–22)
CALCIUM SERPL-MCNC: 10.2 MG/DL (ref 8.5–10.5)
CHLORIDE SERPL-SCNC: 104 MMOL/L (ref 96–112)
CO2 SERPL-SCNC: 27 MMOL/L (ref 20–33)
COLOR UR: YELLOW
CREAT SERPL-MCNC: 0.89 MG/DL (ref 0.5–1.4)
EOSINOPHIL # BLD AUTO: 0.11 K/UL (ref 0–0.51)
EOSINOPHIL NFR BLD: 2.2 % (ref 0–6.9)
ERYTHROCYTE [DISTWIDTH] IN BLOOD BY AUTOMATED COUNT: 48.6 FL (ref 35.9–50)
GLUCOSE SERPL-MCNC: 82 MG/DL (ref 65–99)
GLUCOSE UR STRIP.AUTO-MCNC: NEGATIVE MG/DL
HCT VFR BLD AUTO: 44.2 % (ref 37–47)
HGB BLD-MCNC: 14.6 G/DL (ref 12–16)
IMM GRANULOCYTES # BLD AUTO: 0.02 K/UL (ref 0–0.11)
IMM GRANULOCYTES NFR BLD AUTO: 0.4 % (ref 0–0.9)
INR PPP: 0.97 (ref 0.87–1.13)
KETONES UR STRIP.AUTO-MCNC: NEGATIVE MG/DL
LEUKOCYTE ESTERASE UR QL STRIP.AUTO: NEGATIVE
LYMPHOCYTES # BLD AUTO: 1.95 K/UL (ref 1–4.8)
LYMPHOCYTES NFR BLD: 39.2 % (ref 22–41)
MCH RBC QN AUTO: 32.7 PG (ref 27–33)
MCHC RBC AUTO-ENTMCNC: 33 G/DL (ref 33.6–35)
MCV RBC AUTO: 99.1 FL (ref 81.4–97.8)
MICRO URNS: NORMAL
MONOCYTES # BLD AUTO: 0.51 K/UL (ref 0–0.85)
MONOCYTES NFR BLD AUTO: 10.2 % (ref 0–13.4)
NEUTROPHILS # BLD AUTO: 2.33 K/UL (ref 2–7.15)
NEUTROPHILS NFR BLD: 46.8 % (ref 44–72)
NITRITE UR QL STRIP.AUTO: NEGATIVE
NRBC # BLD AUTO: 0 K/UL
NRBC BLD-RTO: 0 /100 WBC
PH UR STRIP.AUTO: 6 [PH] (ref 5–8)
PLATELET # BLD AUTO: 286 K/UL (ref 164–446)
PMV BLD AUTO: 10.2 FL (ref 9–12.9)
POTASSIUM SERPL-SCNC: 4.3 MMOL/L (ref 3.6–5.5)
PROT UR QL STRIP: NEGATIVE MG/DL
PROTHROMBIN TIME: 13.2 SEC (ref 12–14.6)
RBC # BLD AUTO: 4.46 M/UL (ref 4.2–5.4)
RBC UR QL AUTO: NEGATIVE
SODIUM SERPL-SCNC: 141 MMOL/L (ref 135–145)
SP GR UR STRIP.AUTO: 1
UROBILINOGEN UR STRIP.AUTO-MCNC: 0.2 MG/DL
WBC # BLD AUTO: 5 K/UL (ref 4.8–10.8)

## 2021-02-25 PROCEDURE — U0005 INFEC AGEN DETEC AMPLI PROBE: HCPCS

## 2021-02-25 PROCEDURE — 80048 BASIC METABOLIC PNL TOTAL CA: CPT

## 2021-02-25 PROCEDURE — 71046 X-RAY EXAM CHEST 2 VIEWS: CPT

## 2021-02-25 PROCEDURE — C9803 HOPD COVID-19 SPEC COLLECT: HCPCS

## 2021-02-25 PROCEDURE — 81003 URINALYSIS AUTO W/O SCOPE: CPT

## 2021-02-25 PROCEDURE — U0003 INFECTIOUS AGENT DETECTION BY NUCLEIC ACID (DNA OR RNA); SEVERE ACUTE RESPIRATORY SYNDROME CORONAVIRUS 2 (SARS-COV-2) (CORONAVIRUS DISEASE [COVID-19]), AMPLIFIED PROBE TECHNIQUE, MAKING USE OF HIGH THROUGHPUT TECHNOLOGIES AS DESCRIBED BY CMS-2020-01-R: HCPCS

## 2021-02-25 PROCEDURE — 85025 COMPLETE CBC W/AUTO DIFF WBC: CPT

## 2021-02-25 PROCEDURE — 85610 PROTHROMBIN TIME: CPT

## 2021-02-25 PROCEDURE — 36415 COLL VENOUS BLD VENIPUNCTURE: CPT

## 2021-02-25 PROCEDURE — 85730 THROMBOPLASTIN TIME PARTIAL: CPT

## 2021-02-25 RX ORDER — HYDROCODONE BITARTRATE AND ACETAMINOPHEN 5; 325 MG/1; MG/1
1 TABLET ORAL EVERY 4 HOURS PRN
COMMUNITY

## 2021-02-25 RX ORDER — ACETYLCARNITIN/ALPHA LIPOIC AC 400-200 MG
1 CAPSULE ORAL EVERY EVENING
COMMUNITY
End: 2022-01-04

## 2021-02-25 RX ORDER — GABAPENTIN 600 MG/1
600 TABLET ORAL 3 TIMES DAILY
COMMUNITY
End: 2021-08-12 | Stop reason: DRUGHIGH

## 2021-02-25 RX ORDER — DIPHENHYDRAMINE HCL 25 MG
25 TABLET ORAL
COMMUNITY
End: 2022-09-13

## 2021-02-25 ASSESSMENT — FIBROSIS 4 INDEX: FIB4 SCORE: 0.93

## 2021-02-26 LAB
SARS-COV-2 RNA RESP QL NAA+PROBE: NOTDETECTED
SPECIMEN SOURCE: NORMAL

## 2021-03-02 ENCOUNTER — ANESTHESIA EVENT (OUTPATIENT)
Dept: SURGERY | Facility: MEDICAL CENTER | Age: 71
End: 2021-03-02
Payer: MEDICARE

## 2021-03-03 ENCOUNTER — APPOINTMENT (OUTPATIENT)
Dept: RADIOLOGY | Facility: MEDICAL CENTER | Age: 71
End: 2021-03-03
Attending: NEUROLOGICAL SURGERY
Payer: MEDICARE

## 2021-03-03 ENCOUNTER — ANESTHESIA (OUTPATIENT)
Dept: SURGERY | Facility: MEDICAL CENTER | Age: 71
End: 2021-03-03
Payer: MEDICARE

## 2021-03-03 ENCOUNTER — HOSPITAL ENCOUNTER (OUTPATIENT)
Facility: MEDICAL CENTER | Age: 71
End: 2021-03-05
Attending: NEUROLOGICAL SURGERY | Admitting: NEUROLOGICAL SURGERY
Payer: MEDICARE

## 2021-03-03 PROCEDURE — 96375 TX/PRO/DX INJ NEW DRUG ADDON: CPT | Mod: XU

## 2021-03-03 PROCEDURE — 700102 HCHG RX REV CODE 250 W/ 637 OVERRIDE(OP): Performed by: NURSE PRACTITIONER

## 2021-03-03 PROCEDURE — A9270 NON-COVERED ITEM OR SERVICE: HCPCS | Performed by: ANESTHESIOLOGY

## 2021-03-03 PROCEDURE — 160029 HCHG SURGERY MINUTES - 1ST 30 MINS LEVEL 4: Performed by: NEUROLOGICAL SURGERY

## 2021-03-03 PROCEDURE — A9270 NON-COVERED ITEM OR SERVICE: HCPCS | Performed by: NURSE PRACTITIONER

## 2021-03-03 PROCEDURE — 700111 HCHG RX REV CODE 636 W/ 250 OVERRIDE (IP): Performed by: ANESTHESIOLOGY

## 2021-03-03 PROCEDURE — 700111 HCHG RX REV CODE 636 W/ 250 OVERRIDE (IP): Performed by: NEUROLOGICAL SURGERY

## 2021-03-03 PROCEDURE — 700101 HCHG RX REV CODE 250: Performed by: NEUROLOGICAL SURGERY

## 2021-03-03 PROCEDURE — 500367 HCHG DRAIN KIT, HEMOVAC: Performed by: NEUROLOGICAL SURGERY

## 2021-03-03 PROCEDURE — 110454 HCHG SHELL REV 250: Performed by: NEUROLOGICAL SURGERY

## 2021-03-03 PROCEDURE — G0378 HOSPITAL OBSERVATION PER HR: HCPCS

## 2021-03-03 PROCEDURE — A9270 NON-COVERED ITEM OR SERVICE: HCPCS | Performed by: NEUROLOGICAL SURGERY

## 2021-03-03 PROCEDURE — 160035 HCHG PACU - 1ST 60 MINS PHASE I: Performed by: NEUROLOGICAL SURGERY

## 2021-03-03 PROCEDURE — 160041 HCHG SURGERY MINUTES - EA ADDL 1 MIN LEVEL 4: Performed by: NEUROLOGICAL SURGERY

## 2021-03-03 PROCEDURE — 700101 HCHG RX REV CODE 250: Performed by: ANESTHESIOLOGY

## 2021-03-03 PROCEDURE — 160048 HCHG OR STATISTICAL LEVEL 1-5: Performed by: NEUROLOGICAL SURGERY

## 2021-03-03 PROCEDURE — 501838 HCHG SUTURE GENERAL: Performed by: NEUROLOGICAL SURGERY

## 2021-03-03 PROCEDURE — 700111 HCHG RX REV CODE 636 W/ 250 OVERRIDE (IP): Performed by: NURSE PRACTITIONER

## 2021-03-03 PROCEDURE — 72020 X-RAY EXAM OF SPINE 1 VIEW: CPT

## 2021-03-03 PROCEDURE — 700105 HCHG RX REV CODE 258: Performed by: NEUROLOGICAL SURGERY

## 2021-03-03 PROCEDURE — 160009 HCHG ANES TIME/MIN: Performed by: NEUROLOGICAL SURGERY

## 2021-03-03 PROCEDURE — 700102 HCHG RX REV CODE 250 W/ 637 OVERRIDE(OP): Performed by: ANESTHESIOLOGY

## 2021-03-03 PROCEDURE — 96365 THER/PROPH/DIAG IV INF INIT: CPT | Mod: XU

## 2021-03-03 PROCEDURE — 160002 HCHG RECOVERY MINUTES (STAT): Performed by: NEUROLOGICAL SURGERY

## 2021-03-03 PROCEDURE — 160036 HCHG PACU - EA ADDL 30 MINS PHASE I: Performed by: NEUROLOGICAL SURGERY

## 2021-03-03 PROCEDURE — 96376 TX/PRO/DX INJ SAME DRUG ADON: CPT | Mod: XU

## 2021-03-03 PROCEDURE — 500864 HCHG NEEDLE, SPINAL 18G: Performed by: NEUROLOGICAL SURGERY

## 2021-03-03 PROCEDURE — 700101 HCHG RX REV CODE 250: Performed by: NURSE PRACTITIONER

## 2021-03-03 RX ORDER — SODIUM CHLORIDE, SODIUM LACTATE, POTASSIUM CHLORIDE, CALCIUM CHLORIDE 600; 310; 30; 20 MG/100ML; MG/100ML; MG/100ML; MG/100ML
INJECTION, SOLUTION INTRAVENOUS CONTINUOUS
Status: ACTIVE | OUTPATIENT
Start: 2021-03-03 | End: 2021-03-03

## 2021-03-03 RX ORDER — OXYCODONE HYDROCHLORIDE 10 MG/1
10 TABLET ORAL
Status: DISCONTINUED | OUTPATIENT
Start: 2021-03-03 | End: 2021-03-04

## 2021-03-03 RX ORDER — ACETAMINOPHEN 325 MG/1
650 TABLET ORAL EVERY 6 HOURS PRN
Status: DISCONTINUED | OUTPATIENT
Start: 2021-03-08 | End: 2021-03-05 | Stop reason: HOSPADM

## 2021-03-03 RX ORDER — PHENYLEPHRINE HCL IN 0.9% NACL 0.5 MG/5ML
SYRINGE (ML) INTRAVENOUS PRN
Status: DISCONTINUED | OUTPATIENT
Start: 2021-03-03 | End: 2021-03-03 | Stop reason: SURG

## 2021-03-03 RX ORDER — HYDRALAZINE HYDROCHLORIDE 20 MG/ML
5 INJECTION INTRAMUSCULAR; INTRAVENOUS
Status: DISCONTINUED | OUTPATIENT
Start: 2021-03-03 | End: 2021-03-03 | Stop reason: HOSPADM

## 2021-03-03 RX ORDER — ACETAMINOPHEN 500 MG
1000 TABLET ORAL ONCE
Status: DISCONTINUED | OUTPATIENT
Start: 2021-03-03 | End: 2021-03-03 | Stop reason: HOSPADM

## 2021-03-03 RX ORDER — TRIAMCINOLONE ACETONIDE 1 MG/G
1 CREAM TOPICAL 2 TIMES DAILY
COMMUNITY
Start: 2021-02-11 | End: 2022-03-08

## 2021-03-03 RX ORDER — LABETALOL HYDROCHLORIDE 5 MG/ML
5 INJECTION, SOLUTION INTRAVENOUS
Status: DISCONTINUED | OUTPATIENT
Start: 2021-03-03 | End: 2021-03-03 | Stop reason: HOSPADM

## 2021-03-03 RX ORDER — LEVOTHYROXINE SODIUM 88 UG/1
88 TABLET ORAL
Status: DISCONTINUED | OUTPATIENT
Start: 2021-03-04 | End: 2021-03-05 | Stop reason: HOSPADM

## 2021-03-03 RX ORDER — ONDANSETRON 4 MG/1
4 TABLET, ORALLY DISINTEGRATING ORAL EVERY 4 HOURS PRN
Status: DISCONTINUED | OUTPATIENT
Start: 2021-03-03 | End: 2021-03-05 | Stop reason: HOSPADM

## 2021-03-03 RX ORDER — HYDROMORPHONE HYDROCHLORIDE 1 MG/ML
0.5 INJECTION, SOLUTION INTRAMUSCULAR; INTRAVENOUS; SUBCUTANEOUS
Status: DISCONTINUED | OUTPATIENT
Start: 2021-03-03 | End: 2021-03-04

## 2021-03-03 RX ORDER — SODIUM CHLORIDE AND POTASSIUM CHLORIDE 150; 900 MG/100ML; MG/100ML
INJECTION, SOLUTION INTRAVENOUS CONTINUOUS
Status: DISCONTINUED | OUTPATIENT
Start: 2021-03-03 | End: 2021-03-05 | Stop reason: HOSPADM

## 2021-03-03 RX ORDER — ONDANSETRON 2 MG/ML
4 INJECTION INTRAMUSCULAR; INTRAVENOUS EVERY 4 HOURS PRN
Status: DISCONTINUED | OUTPATIENT
Start: 2021-03-03 | End: 2021-03-05 | Stop reason: HOSPADM

## 2021-03-03 RX ORDER — AMOXICILLIN 250 MG
1 CAPSULE ORAL
Status: DISCONTINUED | OUTPATIENT
Start: 2021-03-03 | End: 2021-03-05 | Stop reason: HOSPADM

## 2021-03-03 RX ORDER — METOPROLOL SUCCINATE 25 MG/1
25 TABLET, EXTENDED RELEASE ORAL
Status: DISCONTINUED | OUTPATIENT
Start: 2021-03-04 | End: 2021-03-05 | Stop reason: HOSPADM

## 2021-03-03 RX ORDER — ONDANSETRON 2 MG/ML
4 INJECTION INTRAMUSCULAR; INTRAVENOUS
Status: DISCONTINUED | OUTPATIENT
Start: 2021-03-03 | End: 2021-03-03 | Stop reason: HOSPADM

## 2021-03-03 RX ORDER — CEFAZOLIN SODIUM 2 G/100ML
2 INJECTION, SOLUTION INTRAVENOUS EVERY 8 HOURS
Status: COMPLETED | OUTPATIENT
Start: 2021-03-03 | End: 2021-03-03

## 2021-03-03 RX ORDER — SIMETHICONE 80 MG
80 TABLET,CHEWABLE ORAL ONCE
COMMUNITY
End: 2023-10-31

## 2021-03-03 RX ORDER — OXYCODONE HYDROCHLORIDE 5 MG/1
5 TABLET ORAL
Status: DISCONTINUED | OUTPATIENT
Start: 2021-03-03 | End: 2021-03-04

## 2021-03-03 RX ORDER — AMOXICILLIN 250 MG
1 CAPSULE ORAL NIGHTLY
Status: DISCONTINUED | OUTPATIENT
Start: 2021-03-03 | End: 2021-03-05 | Stop reason: HOSPADM

## 2021-03-03 RX ORDER — BISACODYL 10 MG
10 SUPPOSITORY, RECTAL RECTAL
Status: DISCONTINUED | OUTPATIENT
Start: 2021-03-03 | End: 2021-03-05 | Stop reason: HOSPADM

## 2021-03-03 RX ORDER — HYDROMORPHONE HYDROCHLORIDE 1 MG/ML
0.1 INJECTION, SOLUTION INTRAMUSCULAR; INTRAVENOUS; SUBCUTANEOUS
Status: DISCONTINUED | OUTPATIENT
Start: 2021-03-03 | End: 2021-03-03 | Stop reason: HOSPADM

## 2021-03-03 RX ORDER — DOCUSATE SODIUM 100 MG/1
100 CAPSULE, LIQUID FILLED ORAL 2 TIMES DAILY
Status: DISCONTINUED | OUTPATIENT
Start: 2021-03-03 | End: 2021-03-05 | Stop reason: HOSPADM

## 2021-03-03 RX ORDER — MEPERIDINE HYDROCHLORIDE 25 MG/ML
6.25 INJECTION INTRAMUSCULAR; INTRAVENOUS; SUBCUTANEOUS
Status: DISCONTINUED | OUTPATIENT
Start: 2021-03-03 | End: 2021-03-03 | Stop reason: HOSPADM

## 2021-03-03 RX ORDER — POLYETHYLENE GLYCOL 3350 17 G/17G
1 POWDER, FOR SOLUTION ORAL 2 TIMES DAILY PRN
Status: DISCONTINUED | OUTPATIENT
Start: 2021-03-03 | End: 2021-03-05 | Stop reason: HOSPADM

## 2021-03-03 RX ORDER — DIPHENHYDRAMINE HYDROCHLORIDE 50 MG/ML
12.5 INJECTION INTRAMUSCULAR; INTRAVENOUS
Status: DISCONTINUED | OUTPATIENT
Start: 2021-03-03 | End: 2021-03-03 | Stop reason: HOSPADM

## 2021-03-03 RX ORDER — HYDROMORPHONE HYDROCHLORIDE 1 MG/ML
0.4 INJECTION, SOLUTION INTRAMUSCULAR; INTRAVENOUS; SUBCUTANEOUS
Status: DISCONTINUED | OUTPATIENT
Start: 2021-03-03 | End: 2021-03-03 | Stop reason: HOSPADM

## 2021-03-03 RX ORDER — TIZANIDINE 4 MG/1
2 TABLET ORAL 3 TIMES DAILY PRN
Status: DISCONTINUED | OUTPATIENT
Start: 2021-03-03 | End: 2021-03-05 | Stop reason: HOSPADM

## 2021-03-03 RX ORDER — SUCCINYLCHOLINE/SOD CL,ISO/PF 200MG/10ML
SYRINGE (ML) INTRAVENOUS PRN
Status: DISCONTINUED | OUTPATIENT
Start: 2021-03-03 | End: 2021-03-03 | Stop reason: SURG

## 2021-03-03 RX ORDER — CEFAZOLIN SODIUM 1 G/3ML
INJECTION, POWDER, FOR SOLUTION INTRAMUSCULAR; INTRAVENOUS
Status: DISCONTINUED | OUTPATIENT
Start: 2021-03-03 | End: 2021-03-03 | Stop reason: HOSPADM

## 2021-03-03 RX ORDER — BUPIVACAINE HYDROCHLORIDE AND EPINEPHRINE 2.5; 5 MG/ML; UG/ML
INJECTION, SOLUTION EPIDURAL; INFILTRATION; INTRACAUDAL; PERINEURAL
Status: DISCONTINUED | OUTPATIENT
Start: 2021-03-03 | End: 2021-03-03 | Stop reason: HOSPADM

## 2021-03-03 RX ORDER — HALOPERIDOL 5 MG/ML
1 INJECTION INTRAMUSCULAR
Status: DISCONTINUED | OUTPATIENT
Start: 2021-03-03 | End: 2021-03-03 | Stop reason: HOSPADM

## 2021-03-03 RX ORDER — DIPHENHYDRAMINE HYDROCHLORIDE 50 MG/ML
25 INJECTION INTRAMUSCULAR; INTRAVENOUS EVERY 6 HOURS PRN
Status: DISCONTINUED | OUTPATIENT
Start: 2021-03-03 | End: 2021-03-05 | Stop reason: HOSPADM

## 2021-03-03 RX ORDER — PHENYLEPHRINE HYDROCHLORIDE 10 MG/ML
INJECTION, SOLUTION INTRAMUSCULAR; INTRAVENOUS; SUBCUTANEOUS PRN
Status: DISCONTINUED | OUTPATIENT
Start: 2021-03-03 | End: 2021-03-03

## 2021-03-03 RX ORDER — ACETAMINOPHEN 325 MG/1
650 TABLET ORAL EVERY 6 HOURS
Status: DISCONTINUED | OUTPATIENT
Start: 2021-03-03 | End: 2021-03-05 | Stop reason: HOSPADM

## 2021-03-03 RX ORDER — ROCURONIUM BROMIDE 10 MG/ML
INJECTION, SOLUTION INTRAVENOUS PRN
Status: DISCONTINUED | OUTPATIENT
Start: 2021-03-03 | End: 2021-03-03 | Stop reason: SURG

## 2021-03-03 RX ORDER — ONDANSETRON 2 MG/ML
INJECTION INTRAMUSCULAR; INTRAVENOUS PRN
Status: DISCONTINUED | OUTPATIENT
Start: 2021-03-03 | End: 2021-03-03 | Stop reason: SURG

## 2021-03-03 RX ORDER — DEXAMETHASONE SODIUM PHOSPHATE 4 MG/ML
INJECTION, SOLUTION INTRA-ARTICULAR; INTRALESIONAL; INTRAMUSCULAR; INTRAVENOUS; SOFT TISSUE PRN
Status: DISCONTINUED | OUTPATIENT
Start: 2021-03-03 | End: 2021-03-03 | Stop reason: SURG

## 2021-03-03 RX ORDER — OXYCODONE HCL 5 MG/5 ML
5 SOLUTION, ORAL ORAL
Status: COMPLETED | OUTPATIENT
Start: 2021-03-03 | End: 2021-03-03

## 2021-03-03 RX ORDER — GABAPENTIN 300 MG/1
600 CAPSULE ORAL 3 TIMES DAILY
Status: DISCONTINUED | OUTPATIENT
Start: 2021-03-03 | End: 2021-03-05 | Stop reason: HOSPADM

## 2021-03-03 RX ORDER — DIPHENHYDRAMINE HCL 25 MG
25 TABLET ORAL EVERY 6 HOURS PRN
Status: DISCONTINUED | OUTPATIENT
Start: 2021-03-03 | End: 2021-03-05 | Stop reason: HOSPADM

## 2021-03-03 RX ORDER — ESTRADIOL 0.1 MG/G
1 CREAM VAGINAL
Status: DISCONTINUED | OUTPATIENT
Start: 2021-03-03 | End: 2021-03-03

## 2021-03-03 RX ORDER — OXYCODONE HCL 5 MG/5 ML
10 SOLUTION, ORAL ORAL
Status: COMPLETED | OUTPATIENT
Start: 2021-03-03 | End: 2021-03-03

## 2021-03-03 RX ORDER — ENEMA 19; 7 G/133ML; G/133ML
1 ENEMA RECTAL
Status: DISCONTINUED | OUTPATIENT
Start: 2021-03-03 | End: 2021-03-05 | Stop reason: HOSPADM

## 2021-03-03 RX ORDER — DOCUSATE SODIUM 100 MG/1
100 CAPSULE, LIQUID FILLED ORAL
COMMUNITY
End: 2022-03-15

## 2021-03-03 RX ORDER — SODIUM CHLORIDE, SODIUM LACTATE, POTASSIUM CHLORIDE, CALCIUM CHLORIDE 600; 310; 30; 20 MG/100ML; MG/100ML; MG/100ML; MG/100ML
INJECTION, SOLUTION INTRAVENOUS CONTINUOUS
Status: DISCONTINUED | OUTPATIENT
Start: 2021-03-03 | End: 2021-03-03 | Stop reason: HOSPADM

## 2021-03-03 RX ORDER — HYDROMORPHONE HYDROCHLORIDE 1 MG/ML
0.2 INJECTION, SOLUTION INTRAMUSCULAR; INTRAVENOUS; SUBCUTANEOUS
Status: DISCONTINUED | OUTPATIENT
Start: 2021-03-03 | End: 2021-03-03 | Stop reason: HOSPADM

## 2021-03-03 RX ORDER — CEFAZOLIN SODIUM 1 G/3ML
INJECTION, POWDER, FOR SOLUTION INTRAMUSCULAR; INTRAVENOUS PRN
Status: DISCONTINUED | OUTPATIENT
Start: 2021-03-03 | End: 2021-03-03 | Stop reason: SURG

## 2021-03-03 RX ADMIN — ONDANSETRON 4 MG: 4 TABLET, ORALLY DISINTEGRATING ORAL at 22:19

## 2021-03-03 RX ADMIN — SODIUM CHLORIDE, POTASSIUM CHLORIDE, SODIUM LACTATE AND CALCIUM CHLORIDE: 600; 310; 30; 20 INJECTION, SOLUTION INTRAVENOUS at 06:25

## 2021-03-03 RX ADMIN — Medication 200 MCG: at 08:12

## 2021-03-03 RX ADMIN — ONDANSETRON 4 MG: 2 INJECTION INTRAMUSCULAR; INTRAVENOUS at 11:14

## 2021-03-03 RX ADMIN — FENTANYL CITRATE 100 MCG: 50 INJECTION, SOLUTION INTRAMUSCULAR; INTRAVENOUS at 07:44

## 2021-03-03 RX ADMIN — ROCURONIUM BROMIDE 10 MG: 10 INJECTION, SOLUTION INTRAVENOUS at 07:10

## 2021-03-03 RX ADMIN — POTASSIUM CHLORIDE AND SODIUM CHLORIDE: 900; 150 INJECTION, SOLUTION INTRAVENOUS at 22:18

## 2021-03-03 RX ADMIN — ONDANSETRON 4 MG: 2 INJECTION INTRAMUSCULAR; INTRAVENOUS at 14:41

## 2021-03-03 RX ADMIN — ACETAMINOPHEN 650 MG: 325 TABLET, FILM COATED ORAL at 11:14

## 2021-03-03 RX ADMIN — FENTANYL CITRATE 25 MCG: 50 INJECTION, SOLUTION INTRAMUSCULAR; INTRAVENOUS at 09:19

## 2021-03-03 RX ADMIN — Medication 100 MG: at 07:07

## 2021-03-03 RX ADMIN — DOCUSATE SODIUM 50 MG AND SENNOSIDES 8.6 MG 1 TABLET: 8.6; 5 TABLET, FILM COATED ORAL at 20:35

## 2021-03-03 RX ADMIN — ACETAMINOPHEN 650 MG: 325 TABLET, FILM COATED ORAL at 17:02

## 2021-03-03 RX ADMIN — HALOPERIDOL LACTATE 1 MG: 5 INJECTION, SOLUTION INTRAMUSCULAR at 08:56

## 2021-03-03 RX ADMIN — SUGAMMADEX 200 MG: 100 INJECTION, SOLUTION INTRAVENOUS at 08:28

## 2021-03-03 RX ADMIN — Medication 200 MCG: at 07:19

## 2021-03-03 RX ADMIN — Medication 200 MCG: at 07:21

## 2021-03-03 RX ADMIN — ACETAMINOPHEN 650 MG: 325 TABLET, FILM COATED ORAL at 22:20

## 2021-03-03 RX ADMIN — CEFAZOLIN 2 G: 330 INJECTION, POWDER, FOR SOLUTION INTRAMUSCULAR; INTRAVENOUS at 07:08

## 2021-03-03 RX ADMIN — DEXAMETHASONE SODIUM PHOSPHATE 4 MG: 4 INJECTION, SOLUTION INTRA-ARTICULAR; INTRALESIONAL; INTRAMUSCULAR; INTRAVENOUS; SOFT TISSUE at 07:17

## 2021-03-03 RX ADMIN — OXYCODONE HYDROCHLORIDE 10 MG: 5 SOLUTION ORAL at 09:01

## 2021-03-03 RX ADMIN — OXYCODONE HYDROCHLORIDE 10 MG: 10 TABLET ORAL at 17:02

## 2021-03-03 RX ADMIN — FENTANYL CITRATE 50 MCG: 50 INJECTION, SOLUTION INTRAMUSCULAR; INTRAVENOUS at 09:08

## 2021-03-03 RX ADMIN — GABAPENTIN 600 MG: 300 CAPSULE ORAL at 11:14

## 2021-03-03 RX ADMIN — DOCUSATE SODIUM 100 MG: 100 CAPSULE, LIQUID FILLED ORAL at 11:14

## 2021-03-03 RX ADMIN — Medication 200 MCG: at 08:23

## 2021-03-03 RX ADMIN — FENTANYL CITRATE 25 MCG: 50 INJECTION, SOLUTION INTRAMUSCULAR; INTRAVENOUS at 09:02

## 2021-03-03 RX ADMIN — GABAPENTIN 600 MG: 300 CAPSULE ORAL at 17:01

## 2021-03-03 RX ADMIN — OXYCODONE 5 MG: 5 TABLET ORAL at 12:34

## 2021-03-03 RX ADMIN — Medication 200 MCG: at 08:02

## 2021-03-03 RX ADMIN — PROPOFOL 150 MG: 10 INJECTION, EMULSION INTRAVENOUS at 07:06

## 2021-03-03 RX ADMIN — CEFAZOLIN SODIUM 2 G: 2 INJECTION, SOLUTION INTRAVENOUS at 22:18

## 2021-03-03 RX ADMIN — Medication 200 MCG: at 07:53

## 2021-03-03 RX ADMIN — FENTANYL CITRATE 100 MCG: 50 INJECTION, SOLUTION INTRAMUSCULAR; INTRAVENOUS at 08:32

## 2021-03-03 RX ADMIN — TIZANIDINE 2 MG: 4 TABLET ORAL at 11:14

## 2021-03-03 RX ADMIN — OXYCODONE HYDROCHLORIDE 10 MG: 10 TABLET ORAL at 20:35

## 2021-03-03 RX ADMIN — DOCUSATE SODIUM 100 MG: 100 CAPSULE, LIQUID FILLED ORAL at 17:02

## 2021-03-03 RX ADMIN — ONDANSETRON 4 MG: 2 INJECTION INTRAMUSCULAR; INTRAVENOUS at 07:17

## 2021-03-03 RX ADMIN — CEFAZOLIN SODIUM 2 G: 2 INJECTION, SOLUTION INTRAVENOUS at 14:05

## 2021-03-03 RX ADMIN — Medication 200 MCG: at 07:17

## 2021-03-03 RX ADMIN — POTASSIUM CHLORIDE AND SODIUM CHLORIDE: 900; 150 INJECTION, SOLUTION INTRAVENOUS at 11:15

## 2021-03-03 RX ADMIN — FENTANYL CITRATE 100 MCG: 50 INJECTION, SOLUTION INTRAMUSCULAR; INTRAVENOUS at 07:06

## 2021-03-03 RX ADMIN — POVIDONE IODINE 15 ML: 100 SOLUTION TOPICAL at 06:25

## 2021-03-03 RX ADMIN — GLYCOPYRROLATE 0.4 MG: 0.2 INJECTION INTRAMUSCULAR; INTRAVENOUS at 07:22

## 2021-03-03 ASSESSMENT — COGNITIVE AND FUNCTIONAL STATUS - GENERAL
CLIMB 3 TO 5 STEPS WITH RAILING: A LITTLE
SUGGESTED CMS G CODE MODIFIER DAILY ACTIVITY: CJ
DRESSING REGULAR LOWER BODY CLOTHING: A LITTLE
MOVING TO AND FROM BED TO CHAIR: A LITTLE
MOVING FROM LYING ON BACK TO SITTING ON SIDE OF FLAT BED: A LITTLE
TURNING FROM BACK TO SIDE WHILE IN FLAT BAD: A LITTLE
SUGGESTED CMS G CODE MODIFIER MOBILITY: CK
MOBILITY SCORE: 18
WALKING IN HOSPITAL ROOM: A LITTLE
HELP NEEDED FOR BATHING: A LITTLE
STANDING UP FROM CHAIR USING ARMS: A LITTLE
TOILETING: A LITTLE
DAILY ACTIVITIY SCORE: 21

## 2021-03-03 ASSESSMENT — LIFESTYLE VARIABLES
HAVE YOU EVER FELT YOU SHOULD CUT DOWN ON YOUR DRINKING: NO
EVER FELT BAD OR GUILTY ABOUT YOUR DRINKING: NO
TOTAL SCORE: 0
CONSUMPTION TOTAL: NEGATIVE
HAVE PEOPLE ANNOYED YOU BY CRITICIZING YOUR DRINKING: NO
EVER HAD A DRINK FIRST THING IN THE MORNING TO STEADY YOUR NERVES TO GET RID OF A HANGOVER: NO
ON A TYPICAL DAY WHEN YOU DRINK ALCOHOL HOW MANY DRINKS DO YOU HAVE: 1
AVERAGE NUMBER OF DAYS PER WEEK YOU HAVE A DRINK CONTAINING ALCOHOL: 3
TOTAL SCORE: 0
HOW MANY TIMES IN THE PAST YEAR HAVE YOU HAD 5 OR MORE DRINKS IN A DAY: 0
ALCOHOL_USE: YES
TOTAL SCORE: 0

## 2021-03-03 ASSESSMENT — PAIN SCALES - GENERAL: PAIN_LEVEL: 0

## 2021-03-03 ASSESSMENT — PAIN DESCRIPTION - PAIN TYPE
TYPE: SURGICAL PAIN
TYPE: CHRONIC PAIN
TYPE: SURGICAL PAIN

## 2021-03-03 ASSESSMENT — FIBROSIS 4 INDEX
FIB4 SCORE: 1.1
FIB4 SCORE: 1.1

## 2021-03-03 ASSESSMENT — PATIENT HEALTH QUESTIONNAIRE - PHQ9
2. FEELING DOWN, DEPRESSED, IRRITABLE, OR HOPELESS: NOT AT ALL
1. LITTLE INTEREST OR PLEASURE IN DOING THINGS: NOT AT ALL
SUM OF ALL RESPONSES TO PHQ9 QUESTIONS 1 AND 2: 0

## 2021-03-03 NOTE — OR NURSING
Pre-op assessment complete, VSS stable, pt and family updated on POC. Call light within reach. Denies needs at this time. Pt. Used restroom at 0630.

## 2021-03-03 NOTE — OP REPORT
DATE OF SERVICE:  03/03/2021     PREOPERATIVE DIAGNOSES:  Lumbar stenosis with neurogenic claudication.     POSTOPERATIVE DIAGNOSES:  Lumbar stenosis with neurogenic claudication.     PROCEDURES:  1.  Decompressive laminectomy L2 with medial facetectomy and foraminotomy.  2.  L3 laminectomy with medial facetectomy and bilateral foraminotomy.  3.  L4 laminectomy with medial facetectomy and bilateral foraminotomy.  4.  L5 laminectomy with medial facetectomy and bilateral foraminotomy.     SURGEON:  Robert Mcdaniel MD     ASSISTANT:  SOSA De La Cruz     ANESTHESIA:  General anesthetic.     ANESTHESIOLOGIST:  Mason Zepeda MD     PREPARATION:  Routine and need for assistance.  During the procedure, there   was drilling around the thecal sac and nerve roots.  The assistant was there   to protect with a Bill, and also assist with irrigation.     Retraction of the nerve root, as we were further decompression was also   needed.  Closure was also performed by our assistant.     PROCEDURE:  The patient was brought to the operating room and following   induction, she was intubated and placed under general anesthetic.  Rolled   prone onto the operating room table, all pressure points were padded and   sequential stockings were in place.  Back was prepped and draped in sterile   fashion.  Proposed incision site was injected with Marcaine 0.25% with   epinephrine, 30 mL.  An incision was made from L2 down through sacrum and   carried down to subcutaneous tissue.  Subperiosteal dissection was carried out   bilaterally at L2, L3, L4, and L5.  Self-retaining retractors were placed to   maintain exposure and intraoperative x-ray confirmed the location.  Spinous   processes of L2, L3, L4, and L5 were removed with Leksell.  We then drilled   sequentially at the junction of the lamina facet at L2 to the ligamentum   flavum.  We then drilled the junction of lamina facet at L3 to the ligamentum   flavum.  We then drilled the junction  of the lamina facet bilaterally at L4   and L5 to ligamentum flavum.  Center portion of the bone was removed at each   of these levels.  We were able to complete a decompression centrally with use   of Kerrisons and we undercut the L2-L3, L3-L4, L4-L5, and L5-S1 interspaces.    The facets were markedly hypertrophied compressing in on the thecal sac,   especially on the right hand side.     Dissecting the thecal sac free at L4-L5, we were able to use #2 and #3   Kerrisons to decompress here and similarly at L3-L4.  We decompressed around   the pedicles of L2, L3, L4, L5, and S1 bilaterally.  The area was copiously   irrigated.  Meticulous hemostasis was obtained.  Medium size Hemovac was   placed.  We closed the fascia with #1 Vicryl suture, subcutaneous tissue with   2-0 Vicryl, subcuticular layer with 2-0 Vicryl and skin with staples.  All   sponge and needle counts were correct.  Estimated blood loss was 80 mL.        ______________________________  MD KATERIN GHOTRA/JOSELIN    DD:  03/03/2021 11:04  DT:  03/03/2021 11:32    Job#:  [id number]

## 2021-03-03 NOTE — PROGRESS NOTES
2 RN Skin Check    2 RN skin check complete.   Devices in place: SCD, NC, Hemovac.  Skin assessed under devices: Yes.  Confirmed pressure ulcers found on: None.  New potential pressure ulcers noted on None.   Surgical incision to lumbar region with mepilex silver and hemovac. Otherwise, Skin C/D/I.

## 2021-03-03 NOTE — ANESTHESIA POSTPROCEDURE EVALUATION
Patient: Argelia Jimenez    Procedure Summary     Date: 03/03/21 Room / Location: [facility] OR 03 / SURGERY [facility]    Anesthesia Start: 0700 Anesthesia Stop:     Procedures:       LAMINECTOMY, SPINE, LUMBAR, WITH DISCECTOMY - L2-S1 (N/A Back)      FORAMINOTOMY, SPINE (N/A Back) Diagnosis: (SPINAL STENOSIS OF LUMBAR REGION)    Surgeons: Robert Mcdaniel M.D. Responsible Provider: Mason Zepeda M.D.    Anesthesia Type: general ASA Status: 2          Final Anesthesia Type: general  Last vitals  BP   Blood Pressure : 150/97    Temp   36.6 °C (97.9 °F)    Pulse   67   Resp   16    SpO2   94 %      Anesthesia Post Evaluation    Patient location during evaluation: PACU  Patient participation: complete - patient participated  Level of consciousness: awake and alert  Pain score: 0    Airway patency: patent  Anesthetic complications: no  Cardiovascular status: hemodynamically stable  Respiratory status: acceptable and face mask  Hydration status: euvolemic    PONV: none          No complications documented.     Nurse Pain Score: 3 (NPRS)

## 2021-03-03 NOTE — ANESTHESIA PROCEDURE NOTES
Airway    Date/Time: 3/3/2021 7:07 AM  Performed by: Mason Zepeda M.D.  Authorized by: Mason Zepeda M.D.     Location:  OR  Urgency:  Elective  Difficult Airway: No    Indications for Airway Management:  Anesthesia      Spontaneous Ventilation: absent    Sedation Level:  Deep  Preoxygenated: Yes    Patient Position:  Sniffing  Mask Difficulty Assessment:  1 - vent by mask  Final Airway Type:  Endotracheal airway  Final Endotracheal Airway:  ETT  Cuffed: Yes    Technique Used for Successful ETT Placement:  Direct laryngoscopy  Devices/Methods Used in Placement:  Cricoid pressure and intubating stylet    Insertion Site:  Oral  Blade Type:  Nisha  Laryngoscope Blade/Videolaryngoscope Blade Size:  3  ETT Size (mm):  7.0  Measured from:  Teeth  ETT to Teeth (cm):  21  Placement Verified by: auscultation and capnometry    Cormack-Lehane Classification:  Grade IIb - view of arytenoids or posterior of glottis only  Number of Attempts at Approach:  1   Pt has large overbite

## 2021-03-03 NOTE — OR NURSING
Patient awake and alert and tolerating sips of water without issue. VSS. Pt pain is decreasing with medications. Nausea has subsided. Pt has been updated on plan of care and all questions have been addressed. Pt  has been updated on pt status and room assignment. Pt has belongings on [employer].     Report given to Kory LUZ. Discussed sign and held orders with RN.     Pt was transported on 1L nasal cannula. Tank was more half full, tubing was connected to patient and to tank. Handoff was given to ANA Claros.

## 2021-03-03 NOTE — ANESTHESIA TIME REPORT
Anesthesia Start and Stop Event Times     Date Time Event    3/3/2021 0641 Ready for Procedure     0700 Anesthesia Start        Responsible Staff  03/03/21    Name Role Begin End    August W STEW Zepeda Anesth 0700         Preop Diagnosis (Free Text):  Pre-op Diagnosis     SPINAL STENOSIS OF LUMBAR REGION        Preop Diagnosis (Codes):    Post op Diagnosis  Lumbar stenosis      Premium Reason  Non-Premium    Comments:

## 2021-03-03 NOTE — ANESTHESIA TIME REPORT
Anesthesia Start and Stop Event Times     Date Time Event    3/3/2021 0641 Ready for Procedure     0700 Anesthesia Start     0843 Anesthesia Stop        Responsible Staff  03/03/21    Name Role Begin End    August W STEW Zepeda Anesth 0700 0843        Preop Diagnosis (Free Text):  Pre-op Diagnosis     SPINAL STENOSIS OF LUMBAR REGION        Preop Diagnosis (Codes):    Post op Diagnosis  Lumbar stenosis      Premium Reason  Non-Premium    Comments:

## 2021-03-03 NOTE — ANESTHESIA PREPROCEDURE EVALUATION
Relevant Problems   NEURO   (+) History of thyroid cancer      CARDIAC   (+) PAC (premature atrial contraction)   (+) PSVT (paroxysmal supraventricular tachycardia) ([facility])      GI   (+) GERD (gastroesophageal reflux disease)      ENDO   (+) Postsurgical hypothyroidism       Physical Exam    Airway   Mallampati: I  TM distance: >3 FB  Neck ROM: full       Cardiovascular - normal exam  Rhythm: regular  Rate: normal  (-) murmur     Dental - normal exam           Pulmonary - normal exam  Breath sounds clear to auscultation     Abdominal - normal exam     Neurological - normal exam                 Anesthesia Plan    ASA 2       Plan - general       Airway plan will be ETT          Induction: intravenous    Postoperative Plan: Postoperative administration of opioids is intended.    Pertinent diagnostic labs and testing reviewed    Informed Consent:    Anesthetic plan and risks discussed with patient.    Use of blood products discussed with: patient whom consented to blood products.

## 2021-03-04 PROCEDURE — 97161 PT EVAL LOW COMPLEX 20 MIN: CPT

## 2021-03-04 PROCEDURE — G0378 HOSPITAL OBSERVATION PER HR: HCPCS

## 2021-03-04 PROCEDURE — 700102 HCHG RX REV CODE 250 W/ 637 OVERRIDE(OP): Performed by: NURSE PRACTITIONER

## 2021-03-04 PROCEDURE — 700101 HCHG RX REV CODE 250: Performed by: NURSE PRACTITIONER

## 2021-03-04 PROCEDURE — 97165 OT EVAL LOW COMPLEX 30 MIN: CPT

## 2021-03-04 PROCEDURE — A9270 NON-COVERED ITEM OR SERVICE: HCPCS | Performed by: NURSE PRACTITIONER

## 2021-03-04 RX ORDER — OXYCODONE HYDROCHLORIDE 10 MG/1
10 TABLET ORAL
Status: DISCONTINUED | OUTPATIENT
Start: 2021-03-04 | End: 2021-03-05 | Stop reason: HOSPADM

## 2021-03-04 RX ORDER — OXYCODONE HYDROCHLORIDE 5 MG/1
5 TABLET ORAL
Status: DISCONTINUED | OUTPATIENT
Start: 2021-03-04 | End: 2021-03-05 | Stop reason: HOSPADM

## 2021-03-04 RX ADMIN — LEVOTHYROXINE SODIUM 88 MCG: 0.09 TABLET ORAL at 05:30

## 2021-03-04 RX ADMIN — DOCUSATE SODIUM 100 MG: 100 CAPSULE, LIQUID FILLED ORAL at 05:30

## 2021-03-04 RX ADMIN — ACETAMINOPHEN 650 MG: 325 TABLET, FILM COATED ORAL at 16:47

## 2021-03-04 RX ADMIN — POTASSIUM CHLORIDE AND SODIUM CHLORIDE: 900; 150 INJECTION, SOLUTION INTRAVENOUS at 09:46

## 2021-03-04 RX ADMIN — GABAPENTIN 600 MG: 300 CAPSULE ORAL at 16:47

## 2021-03-04 RX ADMIN — GABAPENTIN 600 MG: 300 CAPSULE ORAL at 12:21

## 2021-03-04 RX ADMIN — METOPROLOL SUCCINATE 25 MG: 25 TABLET, EXTENDED RELEASE ORAL at 05:30

## 2021-03-04 RX ADMIN — ACETAMINOPHEN 650 MG: 325 TABLET, FILM COATED ORAL at 12:22

## 2021-03-04 RX ADMIN — DOCUSATE SODIUM 100 MG: 100 CAPSULE, LIQUID FILLED ORAL at 16:47

## 2021-03-04 RX ADMIN — ACETAMINOPHEN 650 MG: 325 TABLET, FILM COATED ORAL at 05:30

## 2021-03-04 RX ADMIN — GABAPENTIN 600 MG: 300 CAPSULE ORAL at 05:31

## 2021-03-04 RX ADMIN — DOCUSATE SODIUM 50 MG AND SENNOSIDES 8.6 MG 1 TABLET: 8.6; 5 TABLET, FILM COATED ORAL at 20:57

## 2021-03-04 RX ADMIN — OXYCODONE HYDROCHLORIDE 10 MG: 10 TABLET ORAL at 05:30

## 2021-03-04 ASSESSMENT — COGNITIVE AND FUNCTIONAL STATUS - GENERAL
SUGGESTED CMS G CODE MODIFIER DAILY ACTIVITY: CH
MOVING TO AND FROM BED TO CHAIR: A LITTLE
SUGGESTED CMS G CODE MODIFIER MOBILITY: CK
TURNING FROM BACK TO SIDE WHILE IN FLAT BAD: A LITTLE
CLIMB 3 TO 5 STEPS WITH RAILING: A LITTLE
WALKING IN HOSPITAL ROOM: A LITTLE
STANDING UP FROM CHAIR USING ARMS: A LITTLE
MOVING FROM LYING ON BACK TO SITTING ON SIDE OF FLAT BED: A LITTLE
MOBILITY SCORE: 18
DAILY ACTIVITIY SCORE: 24

## 2021-03-04 ASSESSMENT — GAIT ASSESSMENTS
GAIT LEVEL OF ASSIST: SUPERVISED
DEVIATION: BRADYKINETIC
DISTANCE (FEET): 145

## 2021-03-04 ASSESSMENT — PAIN DESCRIPTION - PAIN TYPE
TYPE: SURGICAL PAIN

## 2021-03-04 ASSESSMENT — ACTIVITIES OF DAILY LIVING (ADL): TOILETING: INDEPENDENT

## 2021-03-04 NOTE — THERAPY
Occupational Therapy   Initial Evaluation     Patient Name: Argelia Jimenez  Age:  70 y.o., Sex:  female  Medical Record #: [id number]  Today's Date: 3/4/2021     Precautions  Precautions: Fall Risk, Spinal / Back Precautions   Comments: no brace per orders    Assessment  Patient is 70 y.o. female s/p L2 - 5 laminectomies. Pt presents to OT eval without functional impairment. Pt able to complete ADLs, ADL transfers, and functional mobility with spv/Kamini. Pt educated on spinal precautions, log roll technique for bed mobility, and compensatory strategies for ADLs. Pt provided with a handout and verbalized understanding of all information. Patient will not be actively followed for occupational therapy services at this time, however may be seen if requested by physician for 1 more visit within 30 days to address any discharge or equipment needs.       Plan    Recommend Occupational Therapy for Evaluation only.    DC Equipment Recommendations: None  Discharge Recommendations: Anticipate that the patient will have no further occupational therapy needs after discharge from the hospital.     Subjective    Pt alert, pleasant, and cooperative with OT eval.     Objective       03/04/21 0847   Prior Living Situation   Prior Services None;Home-Independent   Housing / Facility 2 Story House   Steps Into Home 2   Steps In Home   (FOS up to primary bed/bath)   Bathroom Set up Walk In Shower;Grab Bars  (built in bench)   Equipment Owned Front-Wheel Walker;Single Point Cane;Tub / Shower Seat;Reacher   Lives with - Patient's Self Care Capacity Spouse   Comments Pt lives with spouse who is able to assist with ADLs/IADLs as needed. Pts room is upstairs but does have full bed/bath downstairs that she can stay in if needed.   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)   Leisure Interests Other (Comments)  (taking walks)   Cognition    Comments very pleasant, cooperative, receptive to education, states she watched hours of youtube videos on log roll prior to surgery   Balance Assessment   Comments seated independently, standing with SBA, no AD, no overt LOB   Bed Mobility    Supine to Sit Supervised   Comments via log roll with no verbal cues   ADL Assessment   Grooming Standing;Modified Independent  (oral care)   Lower Body Dressing Modified Independent  (socks)   Toileting Modified Independent   Functional Mobility   Sit to Stand Supervised   Toilet Transfers Supervised   Transfer Method Stand Step   Mobility in room/bathroom with no AD   Patient / Family Goals   Patient / Family Goal #1 To be in less pain

## 2021-03-04 NOTE — DISCHARGE PLANNING
Anticipated Discharge Disposition: home     Action: Pt up and amb with nursing. No discharge needs identified. Anticipate discharge this afternoon or tomorrow am.    Barriers to Discharge: none identified.    Plan: Home

## 2021-03-04 NOTE — PROGRESS NOTES
Neurosurgery Progress Note    Subjective:  Pt seen by myself and Dr. Mcdaniel, she reports sx site pain/ soreness especially with mvmt, legs good. OOB to br, takes po & actually feeling hungry this am. Had 2 bouts of nausea & emesis last night & this am    Exam:  AAO  Dressing c&d, drain remains  Legs strong than preop per Dr. Mcdaniel    BP  Min: 105/63  Max: 149/94  Pulse  Av.7  Min: 55  Max: 89  Resp  Av.3  Min: 13  Max: 23  Temp  Av.4 °C (97.5 °F)  Min: 36.1 °C (97 °F)  Max: 37.1 °C (98.7 °F)  SpO2  Av.3 %  Min: 93 %  Max: 100 %    No data recorded                      Intake/Output       21 - 21 0659 21 - 21 0659       1879-4862 Total  3371-0299 Total       Intake    P.O.  --  340 340  --  -- --    P.O. -- 340 340 -- -- --    I.V.  1250  -- 1250  --  -- --    Volume (mL) (lactated ringers infusion) 1250 -- 1250 -- -- --    Total Intake 6028 480 0030 -- -- --       Output    Urine  --  -- --  --  -- --    Number of Times Voided 2 x 2 x 4 x -- -- --    Emesis  --  600 600  --  -- --    Emesis -- 600 600 -- -- --    Drains  180  120 300  --  -- --    Output (mL) (Closed/Suction Drain Inferior;Midline Back Hemovac 10 Fr.) 180 120 300 -- -- --    Blood  50  -- 50  --  -- --    Est. Blood Loss 50 -- 50 -- -- --    Total Output 230 720 950 -- -- --       Net I/O     1020 -380 640 -- -- --            Intake/Output Summary (Last 24 hours) at 3/4/2021 0840  Last data filed at 3/4/2021 0542  Gross per 24 hour   Intake 340 ml   Output 900 ml   Net -560 ml            • gabapentin  600 mg TID   • levothyroxine  88 mcg AM ES   • metoprolol SR  25 mg Q DAY   • Pharmacy Consult Request  1 Each PHARMACY TO DOSE   • MD ALERT...DO NOT ADMINISTER NSAIDS or ASPIRIN unless ORDERED By Neurosurgery  1 Each PRN   • docusate sodium  100 mg BID   • senna-docusate  1 tablet Nightly   • senna-docusate  1 tablet Q24HRS PRN   • polyethylene glycol/lytes  1 Packet BID PRN   •  magnesium hydroxide  30 mL QDAY PRN   • bisacodyl  10 mg Q24HRS PRN   • fleet  1 Each Once PRN   • 0.9 % NaCl with KCl 20 mEq 1,000 mL   Continuous   • acetaminophen  650 mg Q6HRS    Followed by   • [START ON 3/8/2021] acetaminophen  650 mg Q6HRS PRN   • oxyCODONE immediate-release  5 mg Q3HRS PRN    Or   • oxyCODONE immediate-release  10 mg Q3HRS PRN    Or   • HYDROmorphone  0.5 mg Q3HRS PRN   • diphenhydrAMINE  25 mg Q6HRS PRN    Or   • diphenhydrAMINE  25 mg Q6HRS PRN   • ondansetron  4 mg Q4HRS PRN   • ondansetron  4 mg Q4HRS PRN   • tizanidine  2 mg TID PRN       Assessment and Plan:  POD # 1 L2 - 5 laminectomies  NM stable - pt to increase activity today  Monitor drain with possible removal later  Possible dc home later today vs am  Prophylactic anticoagulation: no         Start date/time: no need      DC Instructions:  DC Rx's sent in from office  Ambulate as much as comfortable  Ok to shower 3/6/21, pat incision dry, leave open to air- no dressing   No Aspirin for 1 week after surgery  No driving for 2 weeks/ No driving while on narcotic medication  Over the counter stool softeners daily while on narcotics  No lifting greater than 15 pounds, no repetitive bending or twisting  Follow up at [facility] 2 weeks after surgery

## 2021-03-04 NOTE — PROGRESS NOTES
Mobility Progress Note    Surgery patient: Yes  Date of surgery: 3/3  Ambulated 50 ft on day of surgery? (N/A if patient did not undergo surgery today): Yes  Number of times ambulated 50 feet or greater today: 2  Patient has been up to chair, edge of bed or HOB 90 degrees for all meals?: Yes  Goal met? (goal is ambulating at least 50 feet 2 times on day shift, one time on night shift): Yes  If patient did not meet mobility goal, why?: N/A

## 2021-03-04 NOTE — THERAPY
Physical Therapy   Initial Evaluation     Patient Name: Argelia Jimenez  Age:  70 y.o., Sex:  female  Medical Record #: [id number]  Today's Date: 3/4/2021     Precautions: Spinal / Back Precautions     Assessment  Ms. Jimenez is a 71 y/o female who presents to acute secondary to L2-5 laminectomies. Pt presents with LE strength that is equal bilaterally and WFL. No sensory deficits. Reviewed spinal precautions and log roll from handout provided by OT earlier, pt demonstrated good recall of all training. She was able to perform gait, transfers, bed mobility, and stairs without physical assist. No LOB, no AD. Stair training completed both laterally using unilateral HR and in standard forward fashion. Spouse participatory in all education. No additional acute PT needs. Recommend follow up with outpatient PT once cleared by MD.     Plan    Recommend Physical Therapy for Evaluation only     DC Equipment Recommendations: None  Discharge Recommendations: Recommend outpatient physical therapy services to address higher level deficits            Objective       03/04/21 1112   Prior Living Situation   Prior Services None   Housing / Facility 2 Story House   Steps Into Home 2   Steps In Home   (flight)   Rail Both Rail (Steps in Home)   Equipment Owned Front-Wheel Walker;Single Point Cane   Lives with - Patient's Self Care Capacity Spouse   Comments spouse present and can assist as needed   Prior Level of Functional Mobility   Bed Mobility Independent   Transfer Status Independent   Ambulation Independent   Distance Ambulation (Feet)   (community ambulator)   Assistive Devices Used None   Stairs Independent   Cognition    Level of Consciousness Alert   Comments pleasant and motivated   Passive ROM Lower Body   Passive ROM Lower Body WDL   Active ROM Lower Body    Active ROM Lower Body  WDL   Strength Lower Body   Lower Body Strength  WDL   Sensation Lower Body   Lower Extremity Sensation   WDL   Balance Assessment   Sitting  Balance (Static) Good   Sitting Balance (Dynamic) Good   Standing Balance (Static) Fair +   Standing Balance (Dynamic) Fair   Weight Shift Sitting Good   Weight Shift Standing Fair   Comments no AD   Gait Analysis   Gait Level Of Assist Supervised   Assistive Device None   Distance (Feet) 145   # of Times Distance was Traveled 1   Deviation Bradykinetic  (lateral tilt L )   # of Stairs Climbed 12   Level of Assist with Stairs Supervised   Weight Bearing Status no restrictions   Comments stairs performed both lateral with unilateral HR and in standard foward fashion   Bed Mobility    Supine to Sit Supervised   Sit to Supine Minimal Assist   Scooting Supervised   Functional Mobility   Sit to Stand Supervised   Bed, Chair, Wheelchair Transfer Supervised

## 2021-03-05 VITALS
WEIGHT: 155.87 LBS | DIASTOLIC BLOOD PRESSURE: 69 MMHG | HEART RATE: 62 BPM | RESPIRATION RATE: 18 BRPM | HEIGHT: 66 IN | BODY MASS INDEX: 25.05 KG/M2 | TEMPERATURE: 98.4 F | SYSTOLIC BLOOD PRESSURE: 108 MMHG | OXYGEN SATURATION: 96 %

## 2021-03-05 PROCEDURE — 700111 HCHG RX REV CODE 636 W/ 250 OVERRIDE (IP): Performed by: NURSE PRACTITIONER

## 2021-03-05 PROCEDURE — 700102 HCHG RX REV CODE 250 W/ 637 OVERRIDE(OP): Performed by: NURSE PRACTITIONER

## 2021-03-05 PROCEDURE — G0378 HOSPITAL OBSERVATION PER HR: HCPCS

## 2021-03-05 PROCEDURE — 700101 HCHG RX REV CODE 250: Performed by: NURSE PRACTITIONER

## 2021-03-05 PROCEDURE — A9270 NON-COVERED ITEM OR SERVICE: HCPCS | Performed by: NURSE PRACTITIONER

## 2021-03-05 RX ADMIN — OXYCODONE 5 MG: 5 TABLET ORAL at 05:31

## 2021-03-05 RX ADMIN — ONDANSETRON 4 MG: 4 TABLET, ORALLY DISINTEGRATING ORAL at 08:13

## 2021-03-05 RX ADMIN — LEVOTHYROXINE SODIUM 88 MCG: 0.09 TABLET ORAL at 05:31

## 2021-03-05 RX ADMIN — POLYETHYLENE GLYCOL 3350 1 PACKET: 17 POWDER, FOR SOLUTION ORAL at 05:32

## 2021-03-05 RX ADMIN — METOPROLOL SUCCINATE 25 MG: 25 TABLET, EXTENDED RELEASE ORAL at 05:31

## 2021-03-05 RX ADMIN — DOCUSATE SODIUM 100 MG: 100 CAPSULE, LIQUID FILLED ORAL at 05:30

## 2021-03-05 RX ADMIN — OXYCODONE HYDROCHLORIDE 10 MG: 10 TABLET ORAL at 00:10

## 2021-03-05 RX ADMIN — ACETAMINOPHEN 650 MG: 325 TABLET, FILM COATED ORAL at 00:10

## 2021-03-05 RX ADMIN — ACETAMINOPHEN 650 MG: 325 TABLET, FILM COATED ORAL at 05:31

## 2021-03-05 RX ADMIN — GABAPENTIN 600 MG: 300 CAPSULE ORAL at 05:31

## 2021-03-05 ASSESSMENT — PAIN DESCRIPTION - PAIN TYPE
TYPE: SURGICAL PAIN

## 2021-03-05 NOTE — PROGRESS NOTES
Neurosurgery Progress Note    Subjective:  Pt doing well, dc home today.     Exam:  AAO  Dressing c&d, drain remains  Legs strong     BP  Min: 113/65  Max: 131/74  Pulse  Av.3  Min: 59  Max: 63  Resp  Av.3  Min: 15  Max: 17  Temp  Av.4 °C (97.6 °F)  Min: 35.8 °C (96.5 °F)  Max: 36.8 °C (98.2 °F)  SpO2  Av %  Min: 92 %  Max: 98 %    No data recorded                      Intake/Output       21 - 21 0659 21 - 21 0659       Total  Total       Intake    Total Intake -- -- -- -- -- --       Output    Urine  --  -- --  --  -- --    Number of Times Voided -- 2 x 2 x -- -- --    Drains  60  70 130  --  -- --    Output (mL) (Closed/Suction Drain Inferior;Midline Back Hemovac 10 Fr.) 60 70 130 -- -- --    Total Output 60 70 130 -- -- --       Net I/O     -60 -70 -130 -- -- --            Intake/Output Summary (Last 24 hours) at 3/5/2021 0826  Last data filed at 3/5/2021 0400  Gross per 24 hour   Intake --   Output 130 ml   Net -130 ml            • oxyCODONE immediate-release  5 mg Q3HRS PRN    Or   • oxyCODONE immediate-release  10 mg Q3HRS PRN    Or   • HYDROmorphone  0.5 mg Q3HRS PRN   • gabapentin  600 mg TID   • levothyroxine  88 mcg AM ES   • metoprolol SR  25 mg Q DAY   • Pharmacy Consult Request  1 Each PHARMACY TO DOSE   • MD ALERT...DO NOT ADMINISTER NSAIDS or ASPIRIN unless ORDERED By Neurosurgery  1 Each PRN   • docusate sodium  100 mg BID   • senna-docusate  1 tablet Nightly   • senna-docusate  1 tablet Q24HRS PRN   • polyethylene glycol/lytes  1 Packet BID PRN   • magnesium hydroxide  30 mL QDAY PRN   • bisacodyl  10 mg Q24HRS PRN   • fleet  1 Each Once PRN   • 0.9 % NaCl with KCl 20 mEq 1,000 mL   Continuous   • acetaminophen  650 mg Q6HRS    Followed by   • [START ON 3/8/2021] acetaminophen  650 mg Q6HRS PRN   • diphenhydrAMINE  25 mg Q6HRS PRN    Or   • diphenhydrAMINE  25 mg Q6HRS PRN   • ondansetron  4 mg Q4HRS PRN   •  ondansetron  4 mg Q4HRS PRN   • tizanidine  2 mg TID PRN       Assessment and Plan:  POD # 2 L2 - 5 laminectomies  NM stable - pt to increase activity today  Monitor drain with possible removal later  Possible dc home later today vs am  Prophylactic anticoagulation: no         Start date/time: no need      DC Instructions:  DC Rx's sent in from office  Ambulate as much as comfortable  Ok to shower 3/6/21, pat incision dry, leave open to air- no dressing   No Aspirin for 1 week after surgery  No driving for 2 weeks/ No driving while on narcotic medication  Over the counter stool softeners daily while on narcotics  No lifting greater than 15 pounds, no repetitive bending or twisting  Follow up at [facility] 2 weeks after surgery

## 2021-03-05 NOTE — DISCHARGE INSTRUCTIONS
Discharge Instructions  DC Rx's sent in from office   Ambulate as much as comfortable   Ok to shower 3/6/21, pat incision dry, leave open to air- no dressing   No Aspirin for 1 week after surgery   No driving for 2 weeks/ No driving while on narcotic medication   Over the counter stool softeners daily while on narcotics   No lifting greater than 15 pounds, no repetitive bending or twisting   Follow up at [facility] 2 weeks after surgery        Discharged to home by car with relative. Discharged via wheelchair, hospital escort: Yes.  Special equipment needed: Walker    Be sure to schedule a follow-up appointment with your primary care doctor or any specialists as instructed.     Discharge Plan:   Influenza Vaccine Indication: Not indicated: Previously immunized this influenza season and > 8 years of age    I understand that a diet low in cholesterol, fat, and sodium is recommended for good health. Unless I have been given specific instructions below for another diet, I accept this instruction as my diet prescription.   Other diet: Regular    Special Instructions: None    · Is patient discharged on Warfarin / Coumadin?   No     Depression / Suicide Risk    As you are discharged from this [facility] facility, it is important to learn how to keep safe from harming yourself.    Recognize the warning signs:  · Abrupt changes in personality, positive or negative- including increase in energy   · Giving away possessions  · Change in eating patterns- significant weight changes-  positive or negative  · Change in sleeping patterns- unable to sleep or sleeping all the time   · Unwillingness or inability to communicate  · Depression  · Unusual sadness, discouragement and loneliness  · Talk of wanting to die  · Neglect of personal appearance   · Rebelliousness- reckless behavior  · Withdrawal from people/activities they love  · Confusion- inability to concentrate     If you or a loved one observes any of these  behaviors or has concerns about self-harm, here's what you can do:  · Talk about it- your feelings and reasons for harming yourself  · Remove any means that you might use to hurt yourself (examples: pills, rope, extension cords, firearm)  · Get professional help from the community (Mental Health, Substance Abuse, psychological counseling)  · Do not be alone:Call your Safe Contact- someone whom you trust who will be there for you.  · Call your local CRISIS HOTLINE 849-5478 or 650-576-6444  · Call your local Children's Mobile Crisis Response Team [place] (812) 970-1803 or www.[employer]  · Call the toll free National Suicide Prevention Hotlines   · National Suicide Prevention Lifeline 501-297-HJSV (1164)  · National Hope Line Network 800-SUICIDE (863-4441)    Lumbar Diskectomy, Care After  This sheet gives you information about how to care for yourself after your procedure. Your health care provider may also give you more specific instructions. If you have problems or questions, contact your health care provider.  What can I expect after the procedure?  After the procedure, it is common to have:  · Pain in the lower back, in the area of the incision.  · Numbness in the legs or in the lower back.  · Weakness in the legs.  Follow these instructions at home:  Incision care    · Follow instructions from your health care provider about how to take care of your incision. Make sure you:  ? Wash your hands with soap and water before you change your bandage (dressing). If soap and water are not available, use hand .  ? Change your dressing as told by your health care provider. You may need to have someone change your dressing for you.  ? Leave stitches (sutures), skin glue, or adhesive strips in place. These skin closures may need to stay in place for 2 weeks or longer. If adhesive strip edges start to loosen and curl up, you may trim the loose edges. Do not remove adhesive strips completely unless your  health care provider tells you to do that.  · Check your incision area every day for signs of infection. If you cannot see your incision, have someone check it for you. Check for:  ? More redness, swelling, or pain.  ? Fluid or blood.  ? Warmth.  ? Pus or a bad smell.  Bathing  · Do not take baths, swim, or use a hot tub until your health care provider approves. Ask your health care provider if you may take showers. You may only be allowed to take sponge baths.  · Keep the dressing dry until your health care provider says it can be removed.  Activity  · Return to your normal activities as told by your health care provider. Ask your health care provider what activities are safe for you.  · Rest as told by your health care provider.  · Avoid sitting or lying for a long time without moving. Get up to take short walks every 1-2 hours. This is important to improve blood flow and breathing. Ask for help if you feel weak or unsteady.  · Do not climb stairs more than one time each day until your health care provider approves.  · Do not bend or twist at the waist until your health care provider approves. To lower yourself to pick things up, bend your knees instead of tipping your upper body forward.  · Do not lift anything that is heavier than 10 lb (4.5 kg), or the limit that you are told, until your health care provider says that it is safe. Avoid lifting anything above the level of your head.  · Avoid pushing and pulling motions.  · Ask your health care provider or physical therapist what kinds of exercises you can do to help with healing. Do these exercises only as directed.  Managing pain, stiffness, and swelling    · If directed, put ice on the injured area:  ? Put ice in a plastic bag.  ? Place a towel between your skin and the bag.  ? Leave the ice on for 20 minutes, 2-3 times a day.  Driving  · Do not drive for 24 hours if you were given a medicine to help you relax (sedative) during your procedure.  · Do not drive  or use heavy machinery while taking prescription pain medicine.  · Ask your health care provider when it is safe to drive.  General instructions  · Take over-the-counter and prescription medicines only as told by your health care provider.  · Do not use any products that contain nicotine or tobacco, such as cigarettes and e-cigarettes. These can delay bone healing. If you need help quitting, ask your health care provider.  · If you are taking prescription pain medicine, take actions to prevent or treat constipation. Your health care provider may recommend that you:  ? Drink enough fluid to keep your urine pale yellow.  ? Eat foods that are high in fiber, such as fresh fruits and vegetables, whole grains, and beans.  ? Limit foods that are high in fat and processed sugars, such as fried or sweet foods.  ? Take an over-the-counter or prescription medicine for constipation.  · If you have a back brace, wear it as told by your health care provider. Remove it only as told by your health care provider.  · Keep all follow-up visits as told by your health care provider. This is important.  Contact a health care provider if:  · You have a fever.  · Your pain is not controlled with medicine.  · You have pain, numbness, or weakness that lasts longer than 3 weeks after surgery.  · You become constipated.  · You have more redness, swelling, or pain in your incision area.  · You have fluid or blood coming from your incision.  · Your incision feels warm to the touch.  · You have pus or a bad smell coming from your incision.  Get help right away if:  · You have increasing pain, numbness, or weakness.  · You lose control of when you urinate or have a bowel movement (incontinence).  · You have chest pain.  · You have trouble breathing.  · You have swelling in your legs.  · You have confusion or are difficult to wake up.  Summary  · After a lumbar diskectomy, it is common to have pain in the lower back and numbness or weakness in the  legs. These should improve in a short amount of time.  · Be sure to check your incision every day for signs of infection. Change dressings and bathe as told by your health care provider.  · Return to your normal activities as told by your health care provider and physical therapist. Avoid twisting, bending, or lifting until your health care provider approves.  · Make sure you know which symptoms should cause you to contact your health care provider or to get help right away.  This information is not intended to replace advice given to you by your health care provider. Make sure you discuss any questions you have with your health care provider.  Document Released: 11/22/2005 Document Revised: 11/30/2018 Document Reviewed: 09/20/2018  Elsevier Patient Education © 2020 [employer]

## 2021-03-05 NOTE — CARE PLAN
Problem: Communication  Goal: The ability to communicate needs accurately and effectively will improve  Outcome: PROGRESSING AS EXPECTED   Patient uses call light communicate with staff and verbalize needs. Updated patient on plan of care, no further questions at this time.   Problem: Safety  Goal: Will remain free from injury  Outcome: PROGRESSING AS EXPECTED   Bed locked and in lowest position, call light and personal belongings within reach, treaded socks and bed alarm in place, hourly rounding on going.

## 2021-03-08 ENCOUNTER — APPOINTMENT (OUTPATIENT)
Dept: ADMISSIONS | Facility: MEDICAL CENTER | Age: 71
End: 2021-03-08
Payer: MEDICARE

## 2021-03-30 NOTE — DISCHARGE SUMMARY
Discharge Summary    CHIEF COMPLAINT ON ADMISSION  No chief complaint on file.      Reason for Admission  SPINAL STENOSIS OF LUMBAR REGION     Admission Date  3/3/2021    CODE STATUS  Prior    HPI & HOSPITAL COURSE  This is a 70 y.o. female here with low back pain and radiculopathy related to lumbar stenosis   No notes on file    Therefore, she is discharged in good and stable condition to home with close outpatient follow-up.    The patient met 2-midnight criteria for an inpatient stay at the time of discharge.    Discharge Date  3/5/2021    FOLLOW UP ITEMS POST DISCHARGE  2 weeks at [facility]    DISCHARGE DIAGNOSES  Active Problems:    * No active hospital problems. *  Resolved Problems:    * No resolved hospital problems. *      FOLLOW UP  Future Appointments   Date Time Provider Department Center   4/14/2021  9:30 AM [facility] None   4/19/2021  3:00 PM Cheryl Pineda M.D. [facility] Sudeep Pineda M.D.  [address]  [facility] [address]  [phone]            MEDICATIONS ON DISCHARGE     Medication List      CHANGE how you take these medications      Instructions   estradiol 0.1 MG/GM vaginal cream  What changed:   · how much to take  · how to take this  · when to take this  · additional instructions  Commonly known as: ESTRACE   Insert 2 to 4 g daily intravaginally for 1 to 2 weeks, then gradually reduce to 1/2 the initial dose for 1 to 2 weeks, followed by a maintenance dose of 1 g 1 to 3 times per week     omeprazole 40 MG delayed-release capsule  What changed: when to take this  Commonly known as: PRILOSEC   Take 1 capsule by mouth once daily        CONTINUE taking these medications      Instructions   Acetylcarn-Alpha Lipoic Acid 400-200 MG Caps   Take 1 capsule by mouth every evening.  Dose: 1 capsule     aspirin 81 MG Chew chewable tablet  Commonly known as: ASA   Take 81 mg by mouth every 48 hours.  Dose: 81 mg     BIOTIN PO   Take 1 tablet by mouth every day.  Dose: 1 tablet      Calcium-Magnesium-Zinc-D3 Tabs   Take 1 tablet by mouth every day.  Dose: 1 tablet     CRANBERRY EXTRACT PO   Take 1 tablet by mouth every evening.  Dose: 1 tablet     D-MANNOSE PO   Take 0.5 g by mouth 2 times a day.  Dose: 0.5 g     diphenhydrAMINE 25 MG Tabs  Commonly known as: BENADRYL   Take 25 mg by mouth 2 (two) times a day.  Dose: 25 mg     docusate sodium 100 MG Caps  Commonly known as: COLACE   Take 100 mg by mouth at bedtime as needed for Constipation.  Dose: 100 mg     ECHINACEA PO   Take 1 tablet by mouth 1 time a day as needed. With propolis  Dose: 1 tablet     FISH OIL PO   Take 1 tablet by mouth.  Dose: 1 tablet     gabapentin 600 MG tablet  Commonly known as: NEURONTIN   Take 600 mg by mouth 3 times a day.  Dose: 600 mg     GARLIC OIL PO   Take 1 tablet by mouth 2 times a day.  Dose: 1 tablet     HYDROcodone-acetaminophen 5-325 MG Tabs per tablet  Commonly known as: NORCO   Take 1 tablet by mouth every four hours as needed.  Dose: 1 tablet     simethicone 80 MG Chew  Commonly known as: MYLICON   Chew 80 mg one time.  Dose: 80 mg     triamcinolone acetonide 0.1 % Crea  Commonly known as: KENALOG   Apply 1 Application topically 2 times a day.  Dose: 1 Application     vitamin B-12 1000 MCG Tabs   Take 1,000 mcg by mouth every day.  Dose: 1,000 mcg        STOP taking these medications    meloxicam 15 MG tablet  Commonly known as: MOBIC            Allergies  No Known Allergies    DIET  No orders of the defined types were placed in this encounter.      ACTIVITY  As tolerated.  Weight bearing as tolerated    CONSULTATIONS  none    PROCEDURES  L2 - 5 laminectomies    LABORATORY  Lab Results   Component Value Date    SODIUM 141 02/25/2021    POTASSIUM 4.3 02/25/2021    CHLORIDE 104 02/25/2021    CO2 27 02/25/2021    GLUCOSE 82 02/25/2021    BUN 15 02/25/2021    CREATININE 0.89 02/25/2021        Lab Results   Component Value Date    WBC 5.0 02/25/2021    HEMOGLOBIN 14.6 02/25/2021    HEMATOCRIT 44.2  02/25/2021    PLATELETCT 286 02/25/2021        Total time of the discharge process exceeds 30 minutes.

## 2021-04-14 ENCOUNTER — HOSPITAL ENCOUNTER (OUTPATIENT)
Dept: LAB | Facility: MEDICAL CENTER | Age: 71
End: 2021-04-14
Attending: FAMILY MEDICINE
Payer: MEDICARE

## 2021-04-14 DIAGNOSIS — E78.5 DYSLIPIDEMIA: ICD-10-CM

## 2021-04-14 LAB
ALBUMIN SERPL BCP-MCNC: 4.1 G/DL (ref 3.2–4.9)
ALBUMIN/GLOB SERPL: 1.4 G/DL
ALP SERPL-CCNC: 78 U/L (ref 30–99)
ALT SERPL-CCNC: 12 U/L (ref 2–50)
ANION GAP SERPL CALC-SCNC: 11 MMOL/L (ref 7–16)
AST SERPL-CCNC: 17 U/L (ref 12–45)
BILIRUB SERPL-MCNC: 0.3 MG/DL (ref 0.1–1.5)
BUN SERPL-MCNC: 9 MG/DL (ref 8–22)
CALCIUM SERPL-MCNC: 9.7 MG/DL (ref 8.5–10.5)
CHLORIDE SERPL-SCNC: 104 MMOL/L (ref 96–112)
CHOLEST SERPL-MCNC: 183 MG/DL (ref 100–199)
CO2 SERPL-SCNC: 26 MMOL/L (ref 20–33)
CREAT SERPL-MCNC: 0.78 MG/DL (ref 0.5–1.4)
FASTING STATUS PATIENT QL REPORTED: NORMAL
GLOBULIN SER CALC-MCNC: 2.9 G/DL (ref 1.9–3.5)
GLUCOSE SERPL-MCNC: 65 MG/DL (ref 65–99)
HDLC SERPL-MCNC: 55 MG/DL
LDLC SERPL CALC-MCNC: 116 MG/DL
POTASSIUM SERPL-SCNC: 4.1 MMOL/L (ref 3.6–5.5)
PROT SERPL-MCNC: 7 G/DL (ref 6–8.2)
SODIUM SERPL-SCNC: 141 MMOL/L (ref 135–145)
TRIGL SERPL-MCNC: 62 MG/DL (ref 0–149)

## 2021-04-14 PROCEDURE — 80061 LIPID PANEL: CPT

## 2021-04-14 PROCEDURE — 36415 COLL VENOUS BLD VENIPUNCTURE: CPT

## 2021-04-14 PROCEDURE — 80053 COMPREHEN METABOLIC PANEL: CPT

## 2021-04-19 ENCOUNTER — OFFICE VISIT (OUTPATIENT)
Dept: MEDICAL GROUP | Facility: PHYSICIAN GROUP | Age: 71
End: 2021-04-19
Payer: MEDICARE

## 2021-04-19 VITALS
HEIGHT: 66 IN | TEMPERATURE: 98.1 F | DIASTOLIC BLOOD PRESSURE: 80 MMHG | SYSTOLIC BLOOD PRESSURE: 124 MMHG | BODY MASS INDEX: 22.82 KG/M2 | HEART RATE: 61 BPM | OXYGEN SATURATION: 98 % | WEIGHT: 141.98 LBS

## 2021-04-19 DIAGNOSIS — I10 ESSENTIAL HYPERTENSION: ICD-10-CM

## 2021-04-19 DIAGNOSIS — M54.41 CHRONIC BILATERAL LOW BACK PAIN WITH RIGHT-SIDED SCIATICA: ICD-10-CM

## 2021-04-19 DIAGNOSIS — N39.0 RECURRENT UTI: ICD-10-CM

## 2021-04-19 DIAGNOSIS — K21.9 GASTROESOPHAGEAL REFLUX DISEASE, UNSPECIFIED WHETHER ESOPHAGITIS PRESENT: ICD-10-CM

## 2021-04-19 DIAGNOSIS — E78.5 DYSLIPIDEMIA: ICD-10-CM

## 2021-04-19 DIAGNOSIS — I47.10 PAROXYSMAL SUPRAVENTRICULAR TACHYCARDIA (HCC): ICD-10-CM

## 2021-04-19 DIAGNOSIS — E89.0 POSTSURGICAL HYPOTHYROIDISM: ICD-10-CM

## 2021-04-19 DIAGNOSIS — G89.29 CHRONIC BILATERAL LOW BACK PAIN WITH RIGHT-SIDED SCIATICA: ICD-10-CM

## 2021-04-19 PROCEDURE — 8041 PR SCP AHA: Performed by: FAMILY MEDICINE

## 2021-04-19 PROCEDURE — 99214 OFFICE O/P EST MOD 30 MIN: CPT | Performed by: FAMILY MEDICINE

## 2021-04-19 ASSESSMENT — PATIENT HEALTH QUESTIONNAIRE - PHQ9
CLINICAL INTERPRETATION OF PHQ2 SCORE: 2
SUM OF ALL RESPONSES TO PHQ QUESTIONS 1-9: 4
5. POOR APPETITE OR OVEREATING: 0 - NOT AT ALL

## 2021-04-19 ASSESSMENT — FIBROSIS 4 INDEX: FIB4 SCORE: 1.2

## 2021-04-19 NOTE — PROGRESS NOTES
Annual Health Assessment Questions:    1.  Are you currently engaging in any exercise or physical activity? Yes    2.  How would you describe your mood or emotional well-being today? fair    3.  Have you had any falls in the last year? No    4.  Have you noticed any problems with your balance or had difficulty walking? No    5.  In the last six months have you experienced any leakage of urine? Yes    6. DPA/Advanced Directive: Patient does not have an Advanced Directive.  A packet and workshop information was given on Advanced Directives.

## 2021-04-20 NOTE — PROGRESS NOTES
Subjective:      Argelia Jimenez is a 70 y.o. female who presents with Follow-Up            HPI     Patient returns for follow-up of her medical problems as well as for [facility].    In terms of her hypertension, she continues to take metoprolol with good control of her blood pressure.    For her dyslipidemia, she continues to manage this with her own efforts.    We follow her for postsurgical hypothyroidism and she continues to take thyroid replacement with levothyroxine.    For her GERD, she takes omeprazole 40 mg 3 times a week only with good control of her symptoms.    She was seen in evaluated by [facility] for recurrent UTI last year.  She said she was put on estradiol vaginal cream which she uses 3 times a week which has been helping.  She has not had another UTI since the last 1 in November 2020.    She underwent lumbar spine laminectomy and foraminotomy for lumbar spine stenosis with neurogenic claudication on 3/3/2021.  She said she has some improvement of the pain but not significant.  She was told that she will have 25 to 30% chance of having another surgery in the future.  She is currently doing physical therapy.  Her surgeon's office is weaning her off gabapentin.  She said she still has left over hydrocodone/APAP 5/325 that she got from me with the last refill in September 2020 that she takes only very sparingly about 4-5 times per week.  She still has ongoing pain across the lower back with radiation down the right leg.    In terms of her PSVT, she continues to take metoprolol with good control of the problem.    Past medical history, past surgical history, family history reviewed-no changes    Social history reviewed-no changes    Allergies reviewed-no changes    Medications reviewed-no changes          ROS     As per HPI, the rest are negative.    Annual Health Assessment Questions:    1.  Are you currently engaging in any exercise or physical activity? Yes    2.  How would you describe your  "mood or emotional well-being today? fair    3.  Have you had any falls in the last year? No    4.  Have you noticed any problems with your balance or had difficulty walking? No    5.  In the last six months have you experienced any leakage of urine? Yes    6. DPA/Advanced Directive: Patient does not have an Advanced Directive.  A packet and workshop information was given on Advanced Directives.       Objective:     /80 (BP Location: Left arm, Patient Position: Sitting, BP Cuff Size: Adult)   Pulse 61   Temp 36.7 °C (98.1 °F) (Temporal)   Ht 1.676 m (5' 6\")   Wt 64.4 kg (141 lb 15.6 oz)   SpO2 98%   BMI 22.92 kg/m²      Physical Exam     Examined alert, awake, oriented, not in distress    Neck-supple, no lymphadenopathy, no thyromegaly  Lungs-clear to auscultation, no rales, no wheezes  Heart-regular rate and rhythm, no murmur  Extremities-no edema, clubbing, cyanosis       Hospital Outpatient Visit on 04/14/2021   Component Date Value Ref Range Status   • Sodium 04/14/2021 141  135 - 145 mmol/L Final   • Potassium 04/14/2021 4.1  3.6 - 5.5 mmol/L Final   • Chloride 04/14/2021 104  96 - 112 mmol/L Final   • Co2 04/14/2021 26  20 - 33 mmol/L Final   • Anion Gap 04/14/2021 11.0  7.0 - 16.0 Final   • Glucose 04/14/2021 65  65 - 99 mg/dL Final   • Bun 04/14/2021 9  8 - 22 mg/dL Final   • Creatinine 04/14/2021 0.78  0.50 - 1.40 mg/dL Final   • Calcium 04/14/2021 9.7  8.5 - 10.5 mg/dL Final   • AST(SGOT) 04/14/2021 17  12 - 45 U/L Final   • ALT(SGPT) 04/14/2021 12  2 - 50 U/L Final   • Alkaline Phosphatase 04/14/2021 78  30 - 99 U/L Final   • Total Bilirubin 04/14/2021 0.3  0.1 - 1.5 mg/dL Final   • Albumin 04/14/2021 4.1  3.2 - 4.9 g/dL Final   • Total Protein 04/14/2021 7.0  6.0 - 8.2 g/dL Final   • Globulin 04/14/2021 2.9  1.9 - 3.5 g/dL Final   • A-G Ratio 04/14/2021 1.4  g/dL Final   • Cholesterol,Tot 04/14/2021 183  100 - 199 mg/dL Final   • Triglycerides 04/14/2021 62  0 - 149 mg/dL Final   • HDL " 04/14/2021 55  >=40 mg/dL Final   • LDL 04/14/2021 116* <100 mg/dL Final   • Fasting Status 04/14/2021 Fasting   Final   • GFR If  04/14/2021 >60  >60 mL/min/1.73 m 2 Final   • GFR If Non  04/14/2021 >60  >60 mL/min/1.73 m 2 Final     The 10-year ASCVD risk score (Sudarshanivon BARRERA Jr., et al., 2013) is: 11.6%     Assessment/Plan:        1. Essential hypertension  Controlled on metoprolol.  - Lipid Profile; Future  - Comp Metabolic Panel; Future  - TSH; Future  - CBC WITH DIFFERENTIAL; Future    2. Dyslipidemia   LDL is improved.  Triglycerides down to goal.  HDL is still at target.  10-year ASCVD risk slightly high at 11.6%.  She does not want to take statin.  She will continue to manage this with diet, exercise and keeping a healthy weight.  - Lipid Profile; Future  - Comp Metabolic Panel; Future  - TSH; Future  - CBC WITH DIFFERENTIAL; Future    3. Postsurgical hypothyroidism  The last TSH was in September 2020 that came back adequately replaced.  Continue the same dose of levothyroxine and recheck TSH next visit.  - Lipid Profile; Future  - Comp Metabolic Panel; Future  - TSH; Future  - CBC WITH DIFFERENTIAL; Future    4. Gastroesophageal reflux disease, unspecified whether esophagitis present  Stable and controlled on omeprazole.  - Lipid Profile; Future  - Comp Metabolic Panel; Future  - TSH; Future  - CBC WITH DIFFERENTIAL; Future    5. Recurrent UTI  She is doing well without any recurrence.  She is on estradiol vaginal cream 3 times a week.  - Lipid Profile; Future  - Comp Metabolic Panel; Future  - TSH; Future  - CBC WITH DIFFERENTIAL; Future    6. Chronic bilateral low back pain with right-sided sciatica  She had recent surgery a month ago with only minimal improvement of her pain.  She is taking pain medication that I have previously prescribed with the last refill in September 2020 hydrocodone/APAP 5/325 about 4-5 times per week.  I told her to discuss pain management but she has  to be seen before we can give her a refill.  - Lipid Profile; Future  - Comp Metabolic Panel; Future  - TSH; Future  - CBC WITH DIFFERENTIAL; Future    7. PSVT (paroxysmal supraventricular tachycardia) (HCC)  Stable and controlled on metoprolol and currently asymptomatic.    Follow-up in 5 months or sooner if needed.      Please note that this dictation was created using voice recognition software. I have worked with consultants from the vendor as well as technical experts from [facility]  [employer] to optimize the interface. I have made every reasonable attempt to correct obvious errors, but I expect that there are errors of grammar and possibly content I did not discover before finalizing the note.

## 2021-04-29 DIAGNOSIS — M54.41 CHRONIC BILATERAL LOW BACK PAIN WITH BILATERAL SCIATICA: ICD-10-CM

## 2021-04-29 DIAGNOSIS — M54.42 CHRONIC BILATERAL LOW BACK PAIN WITH BILATERAL SCIATICA: ICD-10-CM

## 2021-04-29 DIAGNOSIS — Z79.891 CHRONIC USE OF OPIATE FOR THERAPEUTIC PURPOSE: ICD-10-CM

## 2021-04-29 DIAGNOSIS — G89.29 CHRONIC BILATERAL LOW BACK PAIN WITH BILATERAL SCIATICA: ICD-10-CM

## 2021-04-29 DIAGNOSIS — F11.20 OPIOID DEPENDENCE, UNCOMPLICATED (HCC): ICD-10-CM

## 2021-04-29 RX ORDER — HYDROCODONE BITARTRATE AND ACETAMINOPHEN 5; 325 MG/1; MG/1
1 TABLET ORAL EVERY 8 HOURS PRN
Qty: 100 TABLET | Refills: 0 | Status: SHIPPED | OUTPATIENT
Start: 2021-04-29 | End: 2021-04-30 | Stop reason: SDUPTHER

## 2021-04-30 DIAGNOSIS — M54.41 CHRONIC BILATERAL LOW BACK PAIN WITH BILATERAL SCIATICA: ICD-10-CM

## 2021-04-30 DIAGNOSIS — Z79.891 CHRONIC USE OF OPIATE FOR THERAPEUTIC PURPOSE: ICD-10-CM

## 2021-04-30 DIAGNOSIS — F11.20 OPIOID DEPENDENCE, UNCOMPLICATED (HCC): ICD-10-CM

## 2021-04-30 DIAGNOSIS — G89.29 CHRONIC BILATERAL LOW BACK PAIN WITH BILATERAL SCIATICA: ICD-10-CM

## 2021-04-30 DIAGNOSIS — M54.42 CHRONIC BILATERAL LOW BACK PAIN WITH BILATERAL SCIATICA: ICD-10-CM

## 2021-04-30 RX ORDER — HYDROCODONE BITARTRATE AND ACETAMINOPHEN 5; 325 MG/1; MG/1
1 TABLET ORAL EVERY 8 HOURS PRN
Qty: 21 TABLET | Refills: 0 | Status: SHIPPED | OUTPATIENT
Start: 2021-04-30 | End: 2022-03-08 | Stop reason: SDUPTHER

## 2021-05-07 DIAGNOSIS — G89.29 CHRONIC BILATERAL LOW BACK PAIN WITH BILATERAL SCIATICA: ICD-10-CM

## 2021-05-07 DIAGNOSIS — M54.41 CHRONIC BILATERAL LOW BACK PAIN WITH BILATERAL SCIATICA: ICD-10-CM

## 2021-05-07 DIAGNOSIS — Z79.891 CHRONIC USE OF OPIATE FOR THERAPEUTIC PURPOSE: ICD-10-CM

## 2021-05-07 DIAGNOSIS — M54.42 CHRONIC BILATERAL LOW BACK PAIN WITH BILATERAL SCIATICA: ICD-10-CM

## 2021-05-07 DIAGNOSIS — F11.20 OPIOID DEPENDENCE, UNCOMPLICATED (HCC): ICD-10-CM

## 2021-05-07 RX ORDER — HYDROCODONE BITARTRATE AND ACETAMINOPHEN 5; 325 MG/1; MG/1
1 TABLET ORAL EVERY 8 HOURS PRN
Qty: 100 TABLET | Refills: 0 | Status: SHIPPED | OUTPATIENT
Start: 2021-05-07 | End: 2021-08-05

## 2021-07-28 ENCOUNTER — PATIENT MESSAGE (OUTPATIENT)
Dept: HEALTH INFORMATION MANAGEMENT | Facility: OTHER | Age: 71
End: 2021-07-28

## 2021-08-12 DIAGNOSIS — M54.42 CHRONIC BILATERAL LOW BACK PAIN WITH BILATERAL SCIATICA: ICD-10-CM

## 2021-08-12 DIAGNOSIS — G89.29 CHRONIC BILATERAL LOW BACK PAIN WITH BILATERAL SCIATICA: ICD-10-CM

## 2021-08-12 DIAGNOSIS — M54.41 CHRONIC BILATERAL LOW BACK PAIN WITH BILATERAL SCIATICA: ICD-10-CM

## 2021-08-12 RX ORDER — GABAPENTIN 300 MG/1
300 CAPSULE ORAL 3 TIMES DAILY
Qty: 90 CAPSULE | Refills: 1 | Status: SHIPPED | OUTPATIENT
Start: 2021-08-12 | End: 2021-10-24

## 2021-08-18 RX ORDER — MELOXICAM 15 MG/1
TABLET ORAL
Qty: 100 TABLET | Refills: 0 | Status: SHIPPED | OUTPATIENT
Start: 2021-08-18 | End: 2021-11-16 | Stop reason: SDUPTHER

## 2021-09-16 RX ORDER — LEVOTHYROXINE SODIUM 88 UG/1
TABLET ORAL
Qty: 90 TABLET | Refills: 0 | Status: SHIPPED | OUTPATIENT
Start: 2021-09-16 | End: 2022-03-13

## 2021-09-16 RX ORDER — METOPROLOL SUCCINATE 25 MG/1
TABLET, EXTENDED RELEASE ORAL
Qty: 100 TABLET | Refills: 0 | Status: SHIPPED | OUTPATIENT
Start: 2021-09-16 | End: 2022-04-25 | Stop reason: SDUPTHER

## 2021-09-21 ENCOUNTER — HOSPITAL ENCOUNTER (OUTPATIENT)
Dept: LAB | Facility: MEDICAL CENTER | Age: 71
End: 2021-09-21
Attending: FAMILY MEDICINE
Payer: MEDICARE

## 2021-09-21 DIAGNOSIS — E89.0 POSTSURGICAL HYPOTHYROIDISM: ICD-10-CM

## 2021-09-21 DIAGNOSIS — E78.5 DYSLIPIDEMIA: ICD-10-CM

## 2021-09-21 DIAGNOSIS — N39.0 RECURRENT UTI: ICD-10-CM

## 2021-09-21 DIAGNOSIS — G89.29 CHRONIC BILATERAL LOW BACK PAIN WITH RIGHT-SIDED SCIATICA: ICD-10-CM

## 2021-09-21 DIAGNOSIS — M54.41 CHRONIC BILATERAL LOW BACK PAIN WITH RIGHT-SIDED SCIATICA: ICD-10-CM

## 2021-09-21 DIAGNOSIS — I10 ESSENTIAL HYPERTENSION: ICD-10-CM

## 2021-09-21 DIAGNOSIS — K21.9 GASTROESOPHAGEAL REFLUX DISEASE, UNSPECIFIED WHETHER ESOPHAGITIS PRESENT: ICD-10-CM

## 2021-09-21 LAB
ALBUMIN SERPL BCP-MCNC: 4.4 G/DL (ref 3.2–4.9)
ALBUMIN/GLOB SERPL: 1.6 G/DL
ALP SERPL-CCNC: 83 U/L (ref 30–99)
ALT SERPL-CCNC: 16 U/L (ref 2–50)
ANION GAP SERPL CALC-SCNC: 12 MMOL/L (ref 7–16)
AST SERPL-CCNC: 24 U/L (ref 12–45)
BASOPHILS # BLD AUTO: 1.2 % (ref 0–1.8)
BASOPHILS # BLD: 0.07 K/UL (ref 0–0.12)
BILIRUB SERPL-MCNC: 0.3 MG/DL (ref 0.1–1.5)
BUN SERPL-MCNC: 22 MG/DL (ref 8–22)
CALCIUM SERPL-MCNC: 9.9 MG/DL (ref 8.5–10.5)
CHLORIDE SERPL-SCNC: 102 MMOL/L (ref 96–112)
CHOLEST SERPL-MCNC: 227 MG/DL (ref 100–199)
CO2 SERPL-SCNC: 23 MMOL/L (ref 20–33)
CREAT SERPL-MCNC: 0.93 MG/DL (ref 0.5–1.4)
EOSINOPHIL # BLD AUTO: 0.18 K/UL (ref 0–0.51)
EOSINOPHIL NFR BLD: 3.1 % (ref 0–6.9)
ERYTHROCYTE [DISTWIDTH] IN BLOOD BY AUTOMATED COUNT: 45.2 FL (ref 35.9–50)
FASTING STATUS PATIENT QL REPORTED: NORMAL
GLOBULIN SER CALC-MCNC: 2.8 G/DL (ref 1.9–3.5)
GLUCOSE SERPL-MCNC: 79 MG/DL (ref 65–99)
HCT VFR BLD AUTO: 40.2 % (ref 37–47)
HDLC SERPL-MCNC: 68 MG/DL
HGB BLD-MCNC: 13 G/DL (ref 12–16)
IMM GRANULOCYTES # BLD AUTO: 0.01 K/UL (ref 0–0.11)
IMM GRANULOCYTES NFR BLD AUTO: 0.2 % (ref 0–0.9)
LDLC SERPL CALC-MCNC: 138 MG/DL
LYMPHOCYTES # BLD AUTO: 1.9 K/UL (ref 1–4.8)
LYMPHOCYTES NFR BLD: 32.6 % (ref 22–41)
MCH RBC QN AUTO: 30.2 PG (ref 27–33)
MCHC RBC AUTO-ENTMCNC: 32.3 G/DL (ref 33.6–35)
MCV RBC AUTO: 93.5 FL (ref 81.4–97.8)
MONOCYTES # BLD AUTO: 0.68 K/UL (ref 0–0.85)
MONOCYTES NFR BLD AUTO: 11.7 % (ref 0–13.4)
NEUTROPHILS # BLD AUTO: 2.98 K/UL (ref 2–7.15)
NEUTROPHILS NFR BLD: 51.2 % (ref 44–72)
NRBC # BLD AUTO: 0 K/UL
NRBC BLD-RTO: 0 /100 WBC
PLATELET # BLD AUTO: 330 K/UL (ref 164–446)
PMV BLD AUTO: 10.5 FL (ref 9–12.9)
POTASSIUM SERPL-SCNC: 4 MMOL/L (ref 3.6–5.5)
PROT SERPL-MCNC: 7.2 G/DL (ref 6–8.2)
RBC # BLD AUTO: 4.3 M/UL (ref 4.2–5.4)
SODIUM SERPL-SCNC: 137 MMOL/L (ref 135–145)
TRIGL SERPL-MCNC: 107 MG/DL (ref 0–149)
TSH SERPL DL<=0.005 MIU/L-ACNC: 4.68 UIU/ML (ref 0.38–5.33)
WBC # BLD AUTO: 5.8 K/UL (ref 4.8–10.8)

## 2021-09-21 PROCEDURE — 36415 COLL VENOUS BLD VENIPUNCTURE: CPT

## 2021-09-21 PROCEDURE — 80061 LIPID PANEL: CPT

## 2021-09-21 PROCEDURE — 80053 COMPREHEN METABOLIC PANEL: CPT

## 2021-09-21 PROCEDURE — 84443 ASSAY THYROID STIM HORMONE: CPT

## 2021-09-21 PROCEDURE — 85025 COMPLETE CBC W/AUTO DIFF WBC: CPT

## 2021-09-27 ENCOUNTER — OFFICE VISIT (OUTPATIENT)
Dept: MEDICAL GROUP | Facility: PHYSICIAN GROUP | Age: 71
End: 2021-09-27
Payer: MEDICARE

## 2021-09-27 VITALS
SYSTOLIC BLOOD PRESSURE: 110 MMHG | OXYGEN SATURATION: 97 % | WEIGHT: 144.62 LBS | TEMPERATURE: 98.3 F | DIASTOLIC BLOOD PRESSURE: 80 MMHG | HEIGHT: 66 IN | HEART RATE: 55 BPM | BODY MASS INDEX: 23.24 KG/M2

## 2021-09-27 DIAGNOSIS — E89.0 POSTSURGICAL HYPOTHYROIDISM: ICD-10-CM

## 2021-09-27 DIAGNOSIS — I47.10 PAROXYSMAL SUPRAVENTRICULAR TACHYCARDIA (HCC): ICD-10-CM

## 2021-09-27 DIAGNOSIS — K21.9 GASTROESOPHAGEAL REFLUX DISEASE, UNSPECIFIED WHETHER ESOPHAGITIS PRESENT: ICD-10-CM

## 2021-09-27 DIAGNOSIS — Z23 NEED FOR IMMUNIZATION AGAINST INFLUENZA: ICD-10-CM

## 2021-09-27 DIAGNOSIS — E78.5 DYSLIPIDEMIA: ICD-10-CM

## 2021-09-27 DIAGNOSIS — I10 ESSENTIAL HYPERTENSION: ICD-10-CM

## 2021-09-27 PROCEDURE — 99214 OFFICE O/P EST MOD 30 MIN: CPT | Mod: 25 | Performed by: FAMILY MEDICINE

## 2021-09-27 PROCEDURE — 90662 IIV NO PRSV INCREASED AG IM: CPT | Performed by: FAMILY MEDICINE

## 2021-09-27 PROCEDURE — G0008 ADMIN INFLUENZA VIRUS VAC: HCPCS | Performed by: FAMILY MEDICINE

## 2021-09-27 ASSESSMENT — FIBROSIS 4 INDEX: FIB4 SCORE: 1.29

## 2021-09-27 NOTE — PROGRESS NOTES
"Subjective     Argelia Jimenez is a 71 y.o. female who presents with Hypertension            HPI     Patient is here for follow-up of her medical problems.    In terms of her hypertension, this is under control on metoprolol.    For the dyslipidemia, she is managing this with her own efforts only.    She continues to take levothyroxine for postsurgical hypothyroidism.    For her GERD, she takes omeprazole 3 times a week only with good control of her symptoms.    She also takes the metoprolol for PSVT and she has not had any palpitations.    Past medical history, past surgical history, family history reviewed-no changes    Social history reviewed-no changes    Allergies reviewed-no changes    Medications reviewed-no changes    ROS     As per HPI, the rest are negative.           Objective     /80 (BP Location: Left arm, Patient Position: Sitting, BP Cuff Size: Adult)   Pulse (!) 55   Temp 36.8 °C (98.3 °F) (Temporal)   Ht 1.676 m (5' 6\")   Wt 65.6 kg (144 lb 10 oz)   SpO2 97%   BMI 23.34 kg/m²      Physical Exam     Examined alert, awake, oriented, not in distress    Neck-supple, no lymphadenopathy, no thyromegaly  Lungs-clear to auscultation, no rales, no wheezes  Heart-regular rate and rhythm, no murmur  Extremities-no edema, clubbing, cyanosis    Hospital Outpatient Visit on 09/21/2021   Component Date Value Ref Range Status   • TSH 09/21/2021 4.680  0.380 - 5.330 uIU/mL Final    Comment: Reference Range:    Pregnant Females, 1st Trimester  0.050-3.700  Pregnant Females, 2nd Trimester  0.310-4.350  Pregnant Females, 3rd Trimester  0.410-5.180     • WBC 09/21/2021 5.8  4.8 - 10.8 K/uL Final   • RBC 09/21/2021 4.30  4.20 - 5.40 M/uL Final   • Hemoglobin 09/21/2021 13.0  12.0 - 16.0 g/dL Final   • Hematocrit 09/21/2021 40.2  37.0 - 47.0 % Final   • MCV 09/21/2021 93.5  81.4 - 97.8 fL Final   • MCH 09/21/2021 30.2  27.0 - 33.0 pg Final   • MCHC 09/21/2021 32.3* 33.6 - 35.0 g/dL Final   • RDW " 09/21/2021 45.2  35.9 - 50.0 fL Final   • Platelet Count 09/21/2021 330  164 - 446 K/uL Final   • MPV 09/21/2021 10.5  9.0 - 12.9 fL Final   • Neutrophils-Polys 09/21/2021 51.20  44.00 - 72.00 % Final   • Lymphocytes 09/21/2021 32.60  22.00 - 41.00 % Final   • Monocytes 09/21/2021 11.70  0.00 - 13.40 % Final   • Eosinophils 09/21/2021 3.10  0.00 - 6.90 % Final   • Basophils 09/21/2021 1.20  0.00 - 1.80 % Final   • Immature Granulocytes 09/21/2021 0.20  0.00 - 0.90 % Final   • Nucleated RBC 09/21/2021 0.00  /100 WBC Final   • Neutrophils (Absolute) 09/21/2021 2.98  2.00 - 7.15 K/uL Final    Includes immature neutrophils, if present.   • Lymphs (Absolute) 09/21/2021 1.90  1.00 - 4.80 K/uL Final   • Monos (Absolute) 09/21/2021 0.68  0.00 - 0.85 K/uL Final   • Eos (Absolute) 09/21/2021 0.18  0.00 - 0.51 K/uL Final   • Baso (Absolute) 09/21/2021 0.07  0.00 - 0.12 K/uL Final   • Immature Granulocytes (abs) 09/21/2021 0.01  0.00 - 0.11 K/uL Final   • NRBC (Absolute) 09/21/2021 0.00  K/uL Final   • Cholesterol,Tot 09/21/2021 227* 100 - 199 mg/dL Final   • Triglycerides 09/21/2021 107  0 - 149 mg/dL Final   • HDL 09/21/2021 68  >=40 mg/dL Final   • LDL 09/21/2021 138* <100 mg/dL Final   • Sodium 09/21/2021 137  135 - 145 mmol/L Final   • Potassium 09/21/2021 4.0  3.6 - 5.5 mmol/L Final   • Chloride 09/21/2021 102  96 - 112 mmol/L Final   • Co2 09/21/2021 23  20 - 33 mmol/L Final   • Anion Gap 09/21/2021 12.0  7.0 - 16.0 Final   • Glucose 09/21/2021 79  65 - 99 mg/dL Final   • Bun 09/21/2021 22  8 - 22 mg/dL Final   • Creatinine 09/21/2021 0.93  0.50 - 1.40 mg/dL Final   • Calcium 09/21/2021 9.9  8.5 - 10.5 mg/dL Final   • AST(SGOT) 09/21/2021 24  12 - 45 U/L Final   • ALT(SGPT) 09/21/2021 16  2 - 50 U/L Final   • Alkaline Phosphatase 09/21/2021 83  30 - 99 U/L Final   • Total Bilirubin 09/21/2021 0.3  0.1 - 1.5 mg/dL Final   • Albumin 09/21/2021 4.4  3.2 - 4.9 g/dL Final   • Total Protein 09/21/2021 7.2  6.0 - 8.2 g/dL Final    • Globulin 09/21/2021 2.8  1.9 - 3.5 g/dL Final   • A-G Ratio 09/21/2021 1.6  g/dL Final   • Fasting Status 09/21/2021 Fasting   Final   • GFR If  09/21/2021 >60  >60 mL/min/1.73 m 2 Final   • GFR If Non  09/21/2021 59* >60 mL/min/1.73 m 2 Final                         The 10-year ASCVD risk score ([place] HOLLY Jr., et al., 2013) is: 10.5%    Assessment & Plan        1. Essential hypertension  Controlled on metoprolol.    2. Dyslipidemia  LDL cholesterol increased from 116-138.  10-year ASCVD risk score is at 10.5% which is lower than before at 11.6%.  At this time she wants to continue to manage this with her own efforts.  We discussed at length the diet she needs to follow, regular exercises and keeping a healthy weight.  She was given diet sheet/instructions to follow for low-cholesterol diet.  We will do follow-up in 5 months.  There is worsening the LDL cholesterol and if the 10-year ASCVD risk score is higher we will start statin.    3. Postsurgical hypothyroidism  This is adequately replaced.  Continue same dose of levothyroxine.    4. Gastroesophageal reflux disease, unspecified whether esophagitis present  Stable on omeprazole which he takes only 3 days a week.    5. PSVT (paroxysmal supraventricular tachycardia) (HCC)  Currently without symptoms on metoprolol.    I will see her for follow-up in 5 months.      Please note that this dictation was created using voice recognition software. I have worked with consultants from the vendor as well as technical experts from [employer] to optimize the interface. I have made every reasonable attempt to correct obvious errors, but I expect that there are errors of grammar and possibly content I did not discover before finalizing the note.

## 2021-09-27 NOTE — PATIENT INSTRUCTIONS
Cholesterol  Cholesterol is a fat. Your body needs a small amount of cholesterol. Cholesterol may build up in your blood vessels. This increases your chance of having a heart attack or stroke.  You cannot feel your cholesterol levels. The only way to know your cholesterol level is high is with a blood test. Keep your test results. Work with your doctor to keep your cholesterol at a good level.  WHAT DO THE TEST RESULTS MEAN?  · Total cholesterol is how much cholesterol is in your blood.  · LDL is bad cholesterol. This is the type that can build up. You want LDL to be low.  · HDL is good cholesterol. It cleans your blood vessels and carries LDL away. You want HDL to be high.  · Triglycerides are fat that the body can burn for energy or store.  WHAT ARE GOOD LEVELS OF CHOLESTEROL?  · Total cholesterol below 200.  · LDL below 100 for people at risk. Below 70 for those at very high risk.  · HDL above 50 is good. Above 60 is best.  · Triglycerides below 150.  HOW CAN I LOWER MY CHOLESTEROL?  · Diet. Follow your diet programs as told by your doctor.  ¨ Choose fish, white meat chicken, roasted turkey, or baked turkey. Try not to eat red meat, fried foods, or processed meats such as sausage and lunch meats.  ¨ Eat lots of fresh fruits and vegetables.  ¨ Choose whole grains, beans, pasta, potatoes, and cereals.  ¨ Use only small amounts of olive, corn, or canola oils.  ¨ Try not to eat butter, mayonnaise, shortening, or palm kernel oils.  ¨ Try not to eat foods with trans fats.  ¨ Drink skim or nonfat milk. Eat low-fat or nonfat yogurt and cheeses. Try not to drink whole milk or cream. Try not to eat ice cream, egg yolks, and full-fat cheeses.  ¨ Healthy desserts include indigo food cake, mariann snaps, animal crackers, hard candy, popsicles, and low-fat or nonfat frozen yogurt. Try not to eat pastries, cakes, pies, and cookies.  · Exercise. Follow your exercise programs as told by your doctor.  ¨ Be more active. You can try  gardening, walking, or taking the stairs. Ask your doctor about how you can be more active.  · Medicine. Take medicine as told by your doctor.     This information is not intended to replace advice given to you by your health care provider. Make sure you discuss any questions you have with your health care provider.     Document Released: 03/16/2010 Document Revised: 01/08/2016 Document Reviewed: 10/01/2014  [employer] Interactive Patient Education ©2016 [employer]

## 2021-10-21 ENCOUNTER — APPOINTMENT (RX ONLY)
Dept: URBAN - METROPOLITAN AREA CLINIC 20 | Facility: CLINIC | Age: 71
Setting detail: DERMATOLOGY
End: 2021-10-21

## 2021-10-21 DIAGNOSIS — L82.0 INFLAMED SEBORRHEIC KERATOSIS: ICD-10-CM

## 2021-10-21 DIAGNOSIS — L81.4 OTHER MELANIN HYPERPIGMENTATION: ICD-10-CM

## 2021-10-21 DIAGNOSIS — D18.0 HEMANGIOMA: ICD-10-CM

## 2021-10-21 DIAGNOSIS — D22 MELANOCYTIC NEVI: ICD-10-CM

## 2021-10-21 DIAGNOSIS — L82.1 OTHER SEBORRHEIC KERATOSIS: ICD-10-CM

## 2021-10-21 DIAGNOSIS — L57.8 OTHER SKIN CHANGES DUE TO CHRONIC EXPOSURE TO NONIONIZING RADIATION: ICD-10-CM

## 2021-10-21 PROBLEM — D18.01 HEMANGIOMA OF SKIN AND SUBCUTANEOUS TISSUE: Status: ACTIVE | Noted: 2021-10-21

## 2021-10-21 PROBLEM — D22.5 MELANOCYTIC NEVI OF TRUNK: Status: ACTIVE | Noted: 2021-10-21

## 2021-10-21 PROCEDURE — ? LIQUID NITROGEN

## 2021-10-21 PROCEDURE — 17110 DESTRUCTION B9 LES UP TO 14: CPT

## 2021-10-21 PROCEDURE — 99213 OFFICE O/P EST LOW 20 MIN: CPT | Mod: 25

## 2021-10-21 PROCEDURE — ? COUNSELING

## 2021-10-21 ASSESSMENT — LOCATION SIMPLE DESCRIPTION DERM
LOCATION SIMPLE: RIGHT BACK
LOCATION SIMPLE: LEFT FOREARM
LOCATION SIMPLE: ABDOMEN
LOCATION SIMPLE: LEFT UPPER BACK
LOCATION SIMPLE: RIGHT HAND
LOCATION SIMPLE: RIGHT UPPER BACK
LOCATION SIMPLE: LEFT BREAST
LOCATION SIMPLE: LEFT HAND
LOCATION SIMPLE: RIGHT SCALP
LOCATION SIMPLE: CHEST
LOCATION SIMPLE: RIGHT FOREHEAD
LOCATION SIMPLE: RIGHT LOWER BACK
LOCATION SIMPLE: RIGHT BREAST
LOCATION SIMPLE: RIGHT CHEEK
LOCATION SIMPLE: RIGHT FOREARM
LOCATION SIMPLE: LEFT THIGH
LOCATION SIMPLE: LEFT CHEEK

## 2021-10-21 ASSESSMENT — LOCATION DETAILED DESCRIPTION DERM
LOCATION DETAILED: RIGHT SUPERIOR LATERAL MIDBACK
LOCATION DETAILED: RIGHT LATERAL BREAST 7-8:00 REGION
LOCATION DETAILED: RIGHT RADIAL DORSAL HAND
LOCATION DETAILED: RIGHT SUPERIOR MEDIAL UPPER BACK
LOCATION DETAILED: LEFT RADIAL DORSAL HAND
LOCATION DETAILED: RIGHT INFERIOR MEDIAL UPPER BACK
LOCATION DETAILED: RIGHT PROXIMAL DORSAL FOREARM
LOCATION DETAILED: LEFT MID-UPPER BACK
LOCATION DETAILED: LEFT MEDIAL SUPERIOR CHEST
LOCATION DETAILED: LEFT ANTERIOR PROXIMAL THIGH
LOCATION DETAILED: LEFT SUPERIOR UPPER BACK
LOCATION DETAILED: LEFT SUPERIOR MEDIAL UPPER BACK
LOCATION DETAILED: LEFT INFERIOR UPPER BACK
LOCATION DETAILED: RIGHT SUPERIOR UPPER BACK
LOCATION DETAILED: RIGHT SUPERIOR LATERAL UPPER BACK
LOCATION DETAILED: LEFT DISTAL DORSAL FOREARM
LOCATION DETAILED: RIGHT LATERAL BREAST 10-11:00 REGION
LOCATION DETAILED: LEFT LATERAL BREAST 3-4:00 REGION
LOCATION DETAILED: RIGHT MID-UPPER BACK
LOCATION DETAILED: PERIUMBILICAL SKIN
LOCATION DETAILED: RIGHT MEDIAL FRONTAL SCALP
LOCATION DETAILED: RIGHT INFERIOR LATERAL FOREHEAD
LOCATION DETAILED: RIGHT INFERIOR CENTRAL MALAR CHEEK
LOCATION DETAILED: LEFT INFERIOR CENTRAL MALAR CHEEK

## 2021-10-21 ASSESSMENT — LOCATION ZONE DERM
LOCATION ZONE: LEG
LOCATION ZONE: FACE
LOCATION ZONE: TRUNK
LOCATION ZONE: HAND
LOCATION ZONE: SCALP
LOCATION ZONE: ARM

## 2021-10-21 NOTE — PROCEDURE: MIPS QUALITY
Quality 402: Tobacco Use And Help With Quitting Among Adolescents: Patient screened for tobacco and never smoked
Quality 226: Preventive Care And Screening: Tobacco Use: Screening And Cessation Intervention: Patient screened for tobacco use and is an ex/non-smoker
Quality 111:Pneumonia Vaccination Status For Older Adults: Pneumococcal Vaccination Previously Received
Quality 130: Documentation Of Current Medications In The Medical Record: Current Medications Documented
Quality 431: Preventive Care And Screening: Unhealthy Alcohol Use - Screening: Patient screened for unhealthy alcohol use using a single question and scores less than 2 times per year
Detail Level: Detailed

## 2021-11-05 ENCOUNTER — HOSPITAL ENCOUNTER (OUTPATIENT)
Dept: RADIOLOGY | Facility: MEDICAL CENTER | Age: 71
End: 2021-11-05
Attending: FAMILY MEDICINE
Payer: MEDICARE

## 2021-11-05 DIAGNOSIS — Z12.31 VISIT FOR SCREENING MAMMOGRAM: ICD-10-CM

## 2021-11-05 PROCEDURE — 77063 BREAST TOMOSYNTHESIS BI: CPT

## 2021-11-08 DIAGNOSIS — Z12.11 SCREEN FOR COLON CANCER: ICD-10-CM

## 2021-11-08 DIAGNOSIS — Z80.0 FAMILY HISTORY OF COLON CANCER: ICD-10-CM

## 2021-11-16 RX ORDER — MELOXICAM 15 MG/1
15 TABLET ORAL DAILY
Qty: 100 TABLET | Refills: 1 | Status: SHIPPED | OUTPATIENT
Start: 2021-11-16 | End: 2022-06-20 | Stop reason: SDUPTHER

## 2021-12-27 ENCOUNTER — TELEPHONE (OUTPATIENT)
Dept: HEALTH INFORMATION MANAGEMENT | Facility: OTHER | Age: 71
End: 2021-12-27

## 2021-12-27 NOTE — TELEPHONE ENCOUNTER
Warm transfer spouse to  of [facility], no lab order on file and spouse is calling to schedule alb appt for covid test

## 2022-01-04 ENCOUNTER — OFFICE VISIT (OUTPATIENT)
Dept: MEDICAL GROUP | Facility: PHYSICIAN GROUP | Age: 72
End: 2022-01-04
Payer: MEDICARE

## 2022-01-04 VITALS
WEIGHT: 147.27 LBS | SYSTOLIC BLOOD PRESSURE: 100 MMHG | HEIGHT: 66 IN | DIASTOLIC BLOOD PRESSURE: 70 MMHG | HEART RATE: 71 BPM | OXYGEN SATURATION: 98 % | BODY MASS INDEX: 23.67 KG/M2 | TEMPERATURE: 97.5 F

## 2022-01-04 DIAGNOSIS — J20.9 ACUTE BRONCHITIS, UNSPECIFIED ORGANISM: ICD-10-CM

## 2022-01-04 PROCEDURE — 99213 OFFICE O/P EST LOW 20 MIN: CPT | Performed by: FAMILY MEDICINE

## 2022-01-04 RX ORDER — AZITHROMYCIN 250 MG/1
TABLET, FILM COATED ORAL
Qty: 6 TABLET | Refills: 0 | Status: SHIPPED | OUTPATIENT
Start: 2022-01-04 | End: 2022-03-08

## 2022-01-04 ASSESSMENT — PATIENT HEALTH QUESTIONNAIRE - PHQ9: CLINICAL INTERPRETATION OF PHQ2 SCORE: 0

## 2022-01-04 ASSESSMENT — FIBROSIS 4 INDEX: FIB4 SCORE: 1.29

## 2022-01-04 NOTE — PROGRESS NOTES
"Subjective     Argelia Jimenez is a 71 y.o. female who presents with Cough (mucus)            HPI     Patient is here complaining of cough which started 9 days ago.  She went to the [facility] clinic and tested negative for COVID-19  7 days ago.  She has been coughing up phlegm initially greenish in color now yellowish.  Once she starts coughing she goes into coughing fits.  She wakes up at night with a cough.  Denies any fever, chills.  She gets short of breath once she starts coughing hard.  Denies any nasal congestion, nasal discharge, postnasal drip, facial pressure.  No history of exposure to COVID-19 confirmed case.  No history of travel outside the area.  She has been fully vaccinated with Vivacta COVID-19 vaccine including booster shot in November 2021.  She has been taking over-the-counter Mucinex DM, has been using saline spray and warm compresses on her face.    Past medical history, past surgical history, family history reviewed-no changes    Social history reviewed-no changes    Allergies reviewed-no changes    Medications reviewed-no changes    ROS     As per HPI, the rest are negative.           Objective     /70 (BP Location: Left arm, Patient Position: Sitting, BP Cuff Size: Adult)   Pulse 71   Temp 36.4 °C (97.5 °F) (Temporal)   Ht 1.676 m (5' 6\")   Wt 66.8 kg (147 lb 4.3 oz)   SpO2 98%   BMI 23.77 kg/m²      Physical Exam     Examined alert, awake, oriented, not in distress    Ears-tympanic membranes intact bilaterally without evidence of infection.  Nose-no discharge, no obstruction, nontender frontal and maxillary sinuses  Throat-no erythema, tonsils not enlarged, no exudates  Neck-supple, no lymphadenopathy, no thyromegaly  Lungs-clear to auscultation, no rales, no wheezes  Heart-regular rate and rhythm, no murmur  Extremities-no edema, clubbing, cyanosis                        Assessment & Plan        1. Acute bronchitis, unspecified organism  Patient got tested for COVID-19 and " came back -7 days ago.  Symptoms consistent with acute bronchitis.  Normal lung exam.  I will treat with Zithromax [id number] as directed.  Advised increase p.o. fluids.  May continue with Mucinex DM.  She will let me know if there is no improvement or if there is worsening of the problem after she is done with antibiotics.  - azithromycin (ZITHROMAX) 250 MG Tab; Take 2 tabs by mouth the first day then 1 tab daily for 4 days.  Dispense: 6 Tablet; Refill: 0    Keep appointment as scheduled on March 1 for regular follow-up of her medical problems.      Please note that this dictation was created using voice recognition software. I have worked with consultants from the vendor as well as technical experts from [employer] [facility]  [employer] to optimize the interface. I have made every reasonable attempt to correct obvious errors, but I expect that there are errors of grammar and possibly content I did not discover before finalizing the note.

## 2022-02-10 ENCOUNTER — PATIENT MESSAGE (OUTPATIENT)
Dept: HEALTH INFORMATION MANAGEMENT | Facility: OTHER | Age: 72
End: 2022-02-10
Payer: MEDICARE

## 2022-02-15 ENCOUNTER — APPOINTMENT (RX ONLY)
Dept: URBAN - METROPOLITAN AREA CLINIC 20 | Facility: CLINIC | Age: 72
Setting detail: DERMATOLOGY
End: 2022-02-15

## 2022-02-15 DIAGNOSIS — D22 MELANOCYTIC NEVI: ICD-10-CM

## 2022-02-15 DIAGNOSIS — D18.0 HEMANGIOMA: ICD-10-CM

## 2022-02-15 DIAGNOSIS — L81.4 OTHER MELANIN HYPERPIGMENTATION: ICD-10-CM

## 2022-02-15 DIAGNOSIS — L57.8 OTHER SKIN CHANGES DUE TO CHRONIC EXPOSURE TO NONIONIZING RADIATION: ICD-10-CM

## 2022-02-15 DIAGNOSIS — L82.1 OTHER SEBORRHEIC KERATOSIS: ICD-10-CM

## 2022-02-15 PROBLEM — D22.5 MELANOCYTIC NEVI OF TRUNK: Status: ACTIVE | Noted: 2022-02-15

## 2022-02-15 PROBLEM — D18.01 HEMANGIOMA OF SKIN AND SUBCUTANEOUS TISSUE: Status: ACTIVE | Noted: 2022-02-15

## 2022-02-15 PROBLEM — D48.5 NEOPLASM OF UNCERTAIN BEHAVIOR OF SKIN: Status: ACTIVE | Noted: 2022-02-15

## 2022-02-15 PROCEDURE — 11102 TANGNTL BX SKIN SINGLE LES: CPT

## 2022-02-15 PROCEDURE — ? COUNSELING

## 2022-02-15 PROCEDURE — ? BIOPSY BY SHAVE METHOD

## 2022-02-15 PROCEDURE — 99213 OFFICE O/P EST LOW 20 MIN: CPT | Mod: 25

## 2022-02-15 ASSESSMENT — LOCATION DETAILED DESCRIPTION DERM
LOCATION DETAILED: RIGHT ANTERIOR DISTAL THIGH
LOCATION DETAILED: RIGHT CENTRAL MALAR CHEEK
LOCATION DETAILED: RIGHT MID-UPPER BACK
LOCATION DETAILED: RIGHT DISTAL DORSAL FOREARM
LOCATION DETAILED: RIGHT SUPERIOR MEDIAL UPPER BACK
LOCATION DETAILED: RIGHT INFERIOR UPPER BACK
LOCATION DETAILED: LEFT ANTERIOR DISTAL THIGH
LOCATION DETAILED: LEFT INFERIOR CENTRAL MALAR CHEEK
LOCATION DETAILED: LEFT ANTERIOR PROXIMAL UPPER ARM
LOCATION DETAILED: LEFT DISTAL DORSAL FOREARM
LOCATION DETAILED: LEFT MEDIAL SUPERIOR CHEST
LOCATION DETAILED: RIGHT ANTERIOR PROXIMAL UPPER ARM

## 2022-02-15 ASSESSMENT — LOCATION SIMPLE DESCRIPTION DERM
LOCATION SIMPLE: LEFT FOREARM
LOCATION SIMPLE: CHEST
LOCATION SIMPLE: LEFT UPPER ARM
LOCATION SIMPLE: RIGHT FOREARM
LOCATION SIMPLE: RIGHT UPPER ARM
LOCATION SIMPLE: LEFT CHEEK
LOCATION SIMPLE: RIGHT THIGH
LOCATION SIMPLE: RIGHT UPPER BACK
LOCATION SIMPLE: LEFT THIGH
LOCATION SIMPLE: RIGHT CHEEK

## 2022-02-15 ASSESSMENT — LOCATION ZONE DERM
LOCATION ZONE: ARM
LOCATION ZONE: LEG
LOCATION ZONE: TRUNK
LOCATION ZONE: FACE

## 2022-02-22 ENCOUNTER — HOSPITAL ENCOUNTER (OUTPATIENT)
Dept: LAB | Facility: MEDICAL CENTER | Age: 72
End: 2022-02-22
Attending: FAMILY MEDICINE
Payer: MEDICARE

## 2022-02-22 DIAGNOSIS — E78.5 DYSLIPIDEMIA: ICD-10-CM

## 2022-02-22 DIAGNOSIS — E89.0 POSTSURGICAL HYPOTHYROIDISM: ICD-10-CM

## 2022-02-22 DIAGNOSIS — I47.10 PAROXYSMAL SUPRAVENTRICULAR TACHYCARDIA (HCC): ICD-10-CM

## 2022-02-22 DIAGNOSIS — I10 ESSENTIAL HYPERTENSION: ICD-10-CM

## 2022-02-22 DIAGNOSIS — K21.9 GASTROESOPHAGEAL REFLUX DISEASE, UNSPECIFIED WHETHER ESOPHAGITIS PRESENT: ICD-10-CM

## 2022-02-22 LAB
ALBUMIN SERPL BCP-MCNC: 4.5 G/DL (ref 3.2–4.9)
ALBUMIN/GLOB SERPL: 1.8 G/DL
ALP SERPL-CCNC: 77 U/L (ref 30–99)
ALT SERPL-CCNC: 16 U/L (ref 2–50)
ANION GAP SERPL CALC-SCNC: 10 MMOL/L (ref 7–16)
AST SERPL-CCNC: 26 U/L (ref 12–45)
BILIRUB SERPL-MCNC: 0.4 MG/DL (ref 0.1–1.5)
BUN SERPL-MCNC: 16 MG/DL (ref 8–22)
CALCIUM SERPL-MCNC: 9.7 MG/DL (ref 8.5–10.5)
CHLORIDE SERPL-SCNC: 103 MMOL/L (ref 96–112)
CHOLEST SERPL-MCNC: 217 MG/DL (ref 100–199)
CO2 SERPL-SCNC: 23 MMOL/L (ref 20–33)
CREAT SERPL-MCNC: 0.78 MG/DL (ref 0.5–1.4)
FASTING STATUS PATIENT QL REPORTED: NORMAL
GLOBULIN SER CALC-MCNC: 2.5 G/DL (ref 1.9–3.5)
GLUCOSE SERPL-MCNC: 87 MG/DL (ref 65–99)
HDLC SERPL-MCNC: 76 MG/DL
LDLC SERPL CALC-MCNC: 123 MG/DL
POTASSIUM SERPL-SCNC: 4.7 MMOL/L (ref 3.6–5.5)
PROT SERPL-MCNC: 7 G/DL (ref 6–8.2)
SODIUM SERPL-SCNC: 136 MMOL/L (ref 135–145)
TRIGL SERPL-MCNC: 92 MG/DL (ref 0–149)
TSH SERPL DL<=0.005 MIU/L-ACNC: 1 UIU/ML (ref 0.38–5.33)

## 2022-02-22 PROCEDURE — 36415 COLL VENOUS BLD VENIPUNCTURE: CPT

## 2022-02-22 PROCEDURE — 84443 ASSAY THYROID STIM HORMONE: CPT

## 2022-02-22 PROCEDURE — 80061 LIPID PANEL: CPT

## 2022-02-22 PROCEDURE — 80053 COMPREHEN METABOLIC PANEL: CPT

## 2022-03-08 ENCOUNTER — HOSPITAL ENCOUNTER (OUTPATIENT)
Facility: MEDICAL CENTER | Age: 72
End: 2022-03-08
Attending: FAMILY MEDICINE
Payer: MEDICARE

## 2022-03-08 ENCOUNTER — OFFICE VISIT (OUTPATIENT)
Dept: MEDICAL GROUP | Facility: PHYSICIAN GROUP | Age: 72
End: 2022-03-08
Payer: MEDICARE

## 2022-03-08 VITALS
SYSTOLIC BLOOD PRESSURE: 130 MMHG | TEMPERATURE: 98.2 F | BODY MASS INDEX: 24.09 KG/M2 | WEIGHT: 149.91 LBS | HEIGHT: 66 IN | OXYGEN SATURATION: 99 % | HEART RATE: 56 BPM | DIASTOLIC BLOOD PRESSURE: 80 MMHG

## 2022-03-08 DIAGNOSIS — Z79.891 CHRONIC USE OF OPIATE FOR THERAPEUTIC PURPOSE: ICD-10-CM

## 2022-03-08 DIAGNOSIS — E89.0 POSTSURGICAL HYPOTHYROIDISM: ICD-10-CM

## 2022-03-08 DIAGNOSIS — I47.10 PAROXYSMAL SUPRAVENTRICULAR TACHYCARDIA (HCC): ICD-10-CM

## 2022-03-08 DIAGNOSIS — M54.42 CHRONIC BILATERAL LOW BACK PAIN WITH BILATERAL SCIATICA: ICD-10-CM

## 2022-03-08 DIAGNOSIS — G89.29 CHRONIC BILATERAL LOW BACK PAIN WITH BILATERAL SCIATICA: ICD-10-CM

## 2022-03-08 DIAGNOSIS — I10 ESSENTIAL HYPERTENSION: ICD-10-CM

## 2022-03-08 DIAGNOSIS — E78.5 DYSLIPIDEMIA: ICD-10-CM

## 2022-03-08 DIAGNOSIS — M54.41 CHRONIC BILATERAL LOW BACK PAIN WITH BILATERAL SCIATICA: ICD-10-CM

## 2022-03-08 DIAGNOSIS — F11.20 OPIOID DEPENDENCE, UNCOMPLICATED (HCC): ICD-10-CM

## 2022-03-08 DIAGNOSIS — K21.9 GASTROESOPHAGEAL REFLUX DISEASE, UNSPECIFIED WHETHER ESOPHAGITIS PRESENT: ICD-10-CM

## 2022-03-08 PROCEDURE — 99214 OFFICE O/P EST MOD 30 MIN: CPT | Performed by: FAMILY MEDICINE

## 2022-03-08 PROCEDURE — 80307 DRUG TEST PRSMV CHEM ANLYZR: CPT

## 2022-03-08 PROCEDURE — 99000 SPECIMEN HANDLING OFFICE-LAB: CPT | Performed by: FAMILY MEDICINE

## 2022-03-08 RX ORDER — HYDROCODONE BITARTRATE AND ACETAMINOPHEN 5; 325 MG/1; MG/1
1 TABLET ORAL EVERY 8 HOURS PRN
Qty: 21 TABLET | Refills: 0 | Status: SHIPPED | OUTPATIENT
Start: 2022-03-08 | End: 2022-03-15

## 2022-03-08 ASSESSMENT — FIBROSIS 4 INDEX: FIB4 SCORE: 1.4

## 2022-03-08 NOTE — PROGRESS NOTES
Subjective     Argelia Jimenez is a 71 y.o. female who presents with Hyperlipidemia            HPI     Patient is here for follow-up of her medical problems as well as for chronic pain recheck.    Patient has chronic low back pain with bilateral sciatica which has improved significantly after she had her lumbar spine surgery done by Dr. Jonas de leon in March 2021. She still takes hydrocodone/APAP 5/325 1 tablet daily as needed. The last refill was in May 2021 for number 21 tablets only. She therefore takes it only very occasionally. Her last urine drug screen was in September 2020 that came back consistent with medication prescribed but with positive alcohol. She said the last dose was about 3 to 4 days ago.    Chronic pain recheck for: Chronic bilateral low back pain with sciatica  Last dose of controlled substance: 3 to 4 days ago  Chronic pain treated with hydrocodone/APAP 5/325 taken once a day as needed.    She  reports current alcohol use.  She  reports no history of drug use.    Interval history:   Any major change in health since last appointment? No    Consequences of Chronic Opiate therapy:  (5 A's)  Analgesia: Compared to no treatment or prior treatment, pain is currently improved  Activity: improved  Adverse Events: She denies constipation, dry mouth, itchy skin, nausea and sedation  Aberrant Behaviors: She reports she is taking medication as prescribed and is not veering from agreed treatment regimen or provider recommendations. There have been no inappropriate refills or lost/stolen meds reported.  Affect/Mood: Pain is not impacting patient's mood.  Patient denies depression/anxiety.    Nonnarcotic treatments that are being used: Gabapentin, NSAIDs    Last imaging: X-ray of the lumbar spine 12/18, MRI of the lumbar spine 12/18    Opioid Risk Score: 1 (alcohol abuse-brother)    Interpretation of Opioid Risk Score   Score 0-3 = Low risk of abuse. Do UDS at least once per year.  Score 4-7 = Moderate  "risk of abuse. Do UDS 1-4 times per year.  Score 8+ = High risk of abuse. Refer to specialist.    Last order of CONTROLLED SUBSTANCE TREATMENT AGREEMENT was found on 3/8/2022 from Office Visit on 3/8/2022     UDS Summary          Ordered - URINE DRUG SCREEN (Every 360 Days) Ordered on 3/8/2022    09/16/2020  Pain Management Screen    12/13/2018  [facility] SCREEN    11/09/2017  [facility] SCREEN    11/06/2017  [facility] SCREEN    11/06/2017  [facility] SCREEN              Most recent UDS reviewed today and is consistent with prescribed medications but positive for alcohol.     I have reviewed the medical records, the Prescription Monitoring Program and I have determined that controlled substance treatment is medically indicated.     In terms of the hypertension, this is under control on metoprolol.    In terms of dyslipidemia, she is managing this with her own efforts. She has been taking Cholestoff 1 tablet daily but increase to 2 tablets daily about 3 weeks ago when she did her blood work and saw that the cholesterol was still elevated although better    Patient continues to take thyroid replacement for postsurgical hypothyroidism.    For her GERD, she continues to take omeprazole 3 times a week only. She has not tried not taking it at all or on as-needed basis only.    For her PSVT, she continues to take metoprolol. Denies any problems with palpitations.    Past medical history, past surgical history, family history reviewed-no changes    Social history reviewed-no changes    Allergies reviewed-no changes    Medications reviewed-no changes    ROS     As per HPI, the rest are negative.           Objective     /80 (BP Location: Left arm, Patient Position: Sitting, BP Cuff Size: Adult)   Pulse (!) 56   Temp 36.8 °C (98.2 °F) (Temporal)   Ht 1.676 m (5' 6\")   Wt 68 kg (149 lb 14.6 oz)   SpO2 99%   BMI 24.20 kg/m²      Physical " Exam      Examined alert, awake, oriented, not in distress    Neck-supple, no lymphadenopathy, no thyromegaly  Lungs-clear to auscultation, no rales, no wheezes  Heart-regular rate and rhythm, no murmur  Extremities-no edema, clubbing, cyanosis  Back exam-scar from previous lumbar spine surgery, no pinpoint tenderness spinous processes lumbosacral region, no spasms of the paraspinal muscles of the lumbosacral region  Neuro exam-motor strength 5/5 lower extremities, DTRs 2+ lower extremities, sensation intact to light touch lower extremities        Hospital Outpatient Visit on 02/22/2022   Component Date Value Ref Range Status   • TSH 02/22/2022 1.000  0.380 - 5.330 uIU/mL Final    Comment: Reference Range:    Pregnant Females, 1st Trimester  0.050-3.700  Pregnant Females, 2nd Trimester  0.310-4.350  Pregnant Females, 3rd Trimester  0.410-5.180     • Sodium 02/22/2022 136  135 - 145 mmol/L Final   • Potassium 02/22/2022 4.7  3.6 - 5.5 mmol/L Final   • Chloride 02/22/2022 103  96 - 112 mmol/L Final   • Co2 02/22/2022 23  20 - 33 mmol/L Final   • Anion Gap 02/22/2022 10.0  7.0 - 16.0 Final   • Glucose 02/22/2022 87  65 - 99 mg/dL Final   • Bun 02/22/2022 16  8 - 22 mg/dL Final   • Creatinine 02/22/2022 0.78  0.50 - 1.40 mg/dL Final   • Calcium 02/22/2022 9.7  8.5 - 10.5 mg/dL Final   • AST(SGOT) 02/22/2022 26  12 - 45 U/L Final   • ALT(SGPT) 02/22/2022 16  2 - 50 U/L Final   • Alkaline Phosphatase 02/22/2022 77  30 - 99 U/L Final   • Total Bilirubin 02/22/2022 0.4  0.1 - 1.5 mg/dL Final   • Albumin 02/22/2022 4.5  3.2 - 4.9 g/dL Final   • Total Protein 02/22/2022 7.0  6.0 - 8.2 g/dL Final   • Globulin 02/22/2022 2.5  1.9 - 3.5 g/dL Final   • A-G Ratio 02/22/2022 1.8  g/dL Final   • Cholesterol,Tot 02/22/2022 217 (A) 100 - 199 mg/dL Final   • Triglycerides 02/22/2022 92  0 - 149 mg/dL Final   • HDL 02/22/2022 76  >=40 mg/dL Final   • LDL 02/22/2022 123 (A) <100 mg/dL Final   • Fasting Status 02/22/2022 Fasting   Final    • GFR If  02/22/2022 >60  >60 mL/min/1.73 m 2 Final   • GFR If Non  02/22/2022 >60  >60 mL/min/1.73 m 2 Final                 The 10-year ASCVD risk score ([place] HOLLY Jr., et al., 2013) is: 14%    Assessment & Plan        1. Essential hypertension  Controlled on metoprolol. Continue medication.    2. Dyslipidemia  LDL improved but still not at goal. HDL also improved and higher than before at 76. 10-year ASCVD risk score is high at 14%. She still does not want to be on statin. She wants to wait until next visit to see if there is improvement with the Cholestoff. We discussed diet, exercise and keeping healthy weight. She was given a diet sheet.    3. Postsurgical hypothyroidism  Adequately replaced. Continue same dose of levothyroxine.    4. Gastroesophageal reflux disease, unspecified whether esophagitis present  She will try to take the medication only as needed and see if this works for her.    5. PSVT (paroxysmal supraventricular tachycardia) (HCC)  Stable without palpitations on metoprolol.    6. Chronic bilateral low back pain with bilateral sciatica  I refilled her hydrocodone/APAP 5/325 1 tablet daily as needed. She only takes this very occasionally. Prescription sent electronically to the pharmacy. We updated urine drug screen today, controlled substance treatment agreement and informed consent.I have reviewed the medical records, the prescription monitoring program and I have determined that the controlled prescription medication is medically indicated. I have advised the patient to keep the medication in a safe place and not to drive while taking the medication.    7. Chronic use of opiate for therapeutic purpose  Same as #6.    8. Opioid dependence uncomplicated (HCC)  Same as #6.        Follow-up in 6 months or sooner if needed.      Please note that this dictation was created using voice recognition software. I have worked with consultants from the vendor as well as  technical experts from [employer] [facility] to optimize the interface. I have made every reasonable attempt to correct obvious errors, but I expect that there are errors of grammar and possibly content I did not discover before finalizing the note.

## 2022-03-08 NOTE — PATIENT INSTRUCTIONS
Cholesterol  Cholesterol is a white, waxy, fat-like substance needed by your body in small amounts. The liver makes all the cholesterol you need. Cholesterol is carried from the liver by the blood through the blood vessels. Deposits of cholesterol (plaque) may build up on blood vessel walls. These make the arteries narrower and stiffer. Cholesterol plaques increase the risk for heart attack and stroke.   You cannot feel your cholesterol level even if it is very high. The only way to know it is high is with a blood test. Once you know your cholesterol levels, you should keep a record of the test results. Work with your health care provider to keep your levels in the desired range.   WHAT DO THE RESULTS MEAN?  · Total cholesterol is a rough measure of all the cholesterol in your blood.    · LDL is the so-called bad cholesterol. This is the type that deposits cholesterol in the walls of the arteries. You want this level to be low.    · HDL is the good cholesterol because it cleans the arteries and carries the LDL away. You want this level to be high.  · Triglycerides are fat that the body can either burn for energy or store. High levels are closely linked to heart disease.    WHAT ARE THE DESIRED LEVELS OF CHOLESTEROL?  · Total cholesterol below 200.    · LDL below 100 for people at risk, below 70 for those at very high risk.    · HDL above 50 is good, above 60 is best.    · Triglycerides below 150.    HOW CAN I LOWER MY CHOLESTEROL?  · Diet. Follow your diet programs as directed by your health care provider.    ¨ Choose fish or white meat chicken and turkey, roasted or baked. Limit fatty cuts of red meat, fried foods, and processed meats, such as sausage and lunch meats.    ¨ Eat lots of fresh fruits and vegetables.  ¨ Choose whole grains, beans, pasta, potatoes, and cereals.    ¨ Use only small amounts of olive, corn, or canola oils.    ¨ Avoid butter, mayonnaise, shortening, or palm kernel oils.  ¨ Avoid foods with  trans fats.    ¨ Drink skim or nonfat milk and eat low-fat or nonfat yogurt and cheeses. Avoid whole milk, cream, ice cream, egg yolks, and full-fat cheeses.    ¨ Healthy desserts include indigo food cake, mariann snaps, animal crackers, hard candy, popsicles, and low-fat or nonfat frozen yogurt. Avoid pastries, cakes, pies, and cookies.    · Exercise. Follow your exercise programs as directed by your health care provider.    ¨ A regular program helps decrease LDL and raise HDL.    ¨ A regular program helps with weight control.    ¨ Do things that increase your activity level like gardening, walking, or taking the stairs. Ask your health care provider about how you can be more active in your daily life.    · Medicine. Take medicine only as directed by your health care provider.    ¨ Medicine may be prescribed by your health care provider to help lower cholesterol and decrease the risk for heart disease.    ¨ If you have several risk factors, you may need medicine even if your levels are normal.     This information is not intended to replace advice given to you by your health care provider. Make sure you discuss any questions you have with your health care provider.     Document Released: 09/12/2002 Document Revised: 01/08/2016 Document Reviewed: 10/01/2014  [employer] Interactive Patient Education ©2016 [employer]

## 2022-03-11 LAB
1OH-MIDAZOLAM UR QL SCN: NOT DETECTED
6MAM UR QL: NOT DETECTED
7AMINOCLONAZEPAM UR QL: NOT DETECTED
A-OH ALPRAZ UR QL: NOT DETECTED
ALPRAZ UR QL: NOT DETECTED
AMPHET UR QL SCN: NOT DETECTED
ANNOTATION COMMENT IMP: NORMAL
ANNOTATION COMMENT IMP: NORMAL
BARBITURATES UR QL: NOT DETECTED
BUPRENORPHINE UR QL: NOT DETECTED
BZE UR QL: NOT DETECTED
CARBOXYTHC UR QL: NOT DETECTED
CARISOPRODOL UR QL: NOT DETECTED
CLONAZEPAM UR QL: NOT DETECTED
CODEINE UR QL: NOT DETECTED
DIAZEPAM UR QL: NOT DETECTED
ETHYL GLUCURONIDE UR QL: NOT DETECTED
FENTANYL UR QL: NOT DETECTED
GABAPENTIN UR QL: PRESENT
HYDROCODONE UR QL: NOT DETECTED
HYDROMORPHONE UR QL: NOT DETECTED
LORAZEPAM UR QL: NOT DETECTED
MDA UR QL: NOT DETECTED
MDEA UR QL: NOT DETECTED
MDMA UR QL: NOT DETECTED
MEPERIDINE UR QL: NOT DETECTED
METHADONE UR QL: NOT DETECTED
METHAMPHET UR QL: NOT DETECTED
MIDAZOLAM UR QL SCN: NOT DETECTED
MORPHINE UR QL: NOT DETECTED
NALOXONE UR QL SCN: NOT DETECTED
NORBUPRENORPHINE UR QL CFM: NOT DETECTED
NORDIAZEPAM UR QL: NOT DETECTED
NORFENTANYL UR QL: NOT DETECTED
NORHYDROCODONE UR QL CFM: NOT DETECTED
NOROXYCODONE UR QL CFM: NOT DETECTED
NOROXYMORPH CO100 Q0458: NOT DETECTED
OXAZEPAM UR QL: NOT DETECTED
OXYCODONE UR QL: NOT DETECTED
OXYMORPHONE UR QL: NOT DETECTED
PATHOLOGY STUDY: NORMAL
PCP UR QL: NOT DETECTED
PHENTERMINE UR QL: NOT DETECTED
PPAA UR QL: NOT DETECTED
PREGABALIN UR QL SCN: NOT DETECTED
SERVICE CMNT-IMP: NORMAL
TAPENADOL OSULF CO200 Q0473: NOT DETECTED
TAPENTADOL UR QL SCN: NOT DETECTED
TEMAZEPAM UR QL: NOT DETECTED
TRAMADOL UR QL: NOT DETECTED
ZOLPIDEM PHENYL-4-CARB UR QL SCN: NOT DETECTED
ZOLPIDEM UR QL: NOT DETECTED

## 2022-03-15 ENCOUNTER — OFFICE VISIT (OUTPATIENT)
Dept: MEDICAL GROUP | Facility: PHYSICIAN GROUP | Age: 72
End: 2022-03-15
Payer: MEDICARE

## 2022-03-15 VITALS
OXYGEN SATURATION: 97 % | HEART RATE: 56 BPM | TEMPERATURE: 97.6 F | BODY MASS INDEX: 24.06 KG/M2 | HEIGHT: 66 IN | DIASTOLIC BLOOD PRESSURE: 80 MMHG | SYSTOLIC BLOOD PRESSURE: 130 MMHG | WEIGHT: 149.69 LBS

## 2022-03-15 DIAGNOSIS — I48.91 NEW ONSET ATRIAL FIBRILLATION (HCC): ICD-10-CM

## 2022-03-15 DIAGNOSIS — R00.2 PALPITATIONS: ICD-10-CM

## 2022-03-15 PROCEDURE — 99214 OFFICE O/P EST MOD 30 MIN: CPT | Performed by: FAMILY MEDICINE

## 2022-03-15 PROCEDURE — 93000 ELECTROCARDIOGRAM COMPLETE: CPT | Performed by: FAMILY MEDICINE

## 2022-03-15 ASSESSMENT — FIBROSIS 4 INDEX: FIB4 SCORE: 1.4

## 2022-03-15 NOTE — PROGRESS NOTES
"Subjective     Argelia Jimenez is a 71 y.o. female who presents with Other (EKG)            HPI     Patient is here complaining of palpitations which is a longstanding problem for her for about 20 years.  She has been diagnosed with PSVT 20 years ago and has been on metoprolol which controls her rate.  She started feeling palpitations which are different from her usual palpitations because her heart rate was not fast but she felt them to be irregular.  This started 3 days ago.  It was on and off all day long associated with some mild shortness of breath and not feeling good overall.  Went away in the evening and came back the next day which was 2 days ago in the evening time.  She was able to record her rhythm strip on her apple watch that time and it showed irregular irregular rhythm consistent with A. fib with no P waves in the rhythm strip.  She said it happened again yesterday while she was shopping which lasted only for a few minutes.  Denies any chest pain, feeling faint or that she is going to pass out during these episodes.    Prior diagnosis of A. Fib.    Patient was previously under the care of Dr. Ana Mckenna, cardiologist for the PSVT.  Her last echocardiogram was in 2018 which showed EF of 55% and no abnormal findings in the valves.    Patient has postsurgical hypothyroidism due to thyroid cancer and is on levothyroxine.  Last TSH was a month ago and was within normal limits.    Past medical history, past surgical history, family history reviewed-no changes    Social history reviewed-no changes    Allergies reviewed-no changes    Medications reviewed-no changes    ROS     As per HPI, the rest are negative.           Objective     /80 (BP Location: Left arm, Patient Position: Sitting, BP Cuff Size: Adult)   Pulse (!) 56   Temp 36.4 °C (97.6 °F) (Temporal)   Ht 1.676 m (5' 6\")   Wt 67.9 kg (149 lb 11.1 oz)   SpO2 97%   BMI 24.16 kg/m²      Physical Exam     Examined alert, awake, " oriented, not in distress    Neck-supple, no lymphadenopathy, no thyromegaly  Lungs-clear to auscultation, no rales, no wheezes  Heart-regular rhythm, slightly bradycardic in the 50s, no murmur  Extremities-no edema, clubbing, cyanosis          EKG done in the office rate of 56, left axis deviation, nonspecific T wave inversions in leads 3 and V1             Assessment & Plan        1. Palpitations  Rhythm strip that was recorded by her Apple Watch 2 days ago did not show any P waves and concerning for A. fib.  Our EKG done in the office today including rhythm strip did not show any irregularity/arrhythmia.  I will refer her to our [facility] cardiology group for further evaluation and treatment.  She will most likely need heart monitor done an updated echo.  ER precautions if she has palpitations especially with a rapid heartbeat in the 120s or higher with associated dizziness, lightheadedness.  - REFERRAL TO CARDIOLOGY    2. New onset atrial fibrillation (HCC)  Plan the same as #1.  - REFERRAL TO CARDIOLOGY        Please note that this dictation was created using voice recognition software. I have worked with consultants from the vendor as well as technical experts from [employer] to optimize the interface. I have made every reasonable attempt to correct obvious errors, but I expect that there are errors of grammar and possibly content I did not discover before finalizing the note.

## 2022-04-12 ENCOUNTER — OFFICE VISIT (OUTPATIENT)
Dept: CARDIOLOGY | Facility: MEDICAL CENTER | Age: 72
End: 2022-04-12
Payer: MEDICARE

## 2022-04-12 VITALS
RESPIRATION RATE: 16 BRPM | OXYGEN SATURATION: 97 % | SYSTOLIC BLOOD PRESSURE: 116 MMHG | DIASTOLIC BLOOD PRESSURE: 76 MMHG | WEIGHT: 154 LBS | BODY MASS INDEX: 24.75 KG/M2 | HEART RATE: 68 BPM | HEIGHT: 66 IN

## 2022-04-12 DIAGNOSIS — I49.1 PAC (PREMATURE ATRIAL CONTRACTION): ICD-10-CM

## 2022-04-12 DIAGNOSIS — I48.0 PAROXYSMAL ATRIAL FIBRILLATION (HCC): ICD-10-CM

## 2022-04-12 DIAGNOSIS — E78.5 DYSLIPIDEMIA: ICD-10-CM

## 2022-04-12 DIAGNOSIS — I47.10 PAROXYSMAL SUPRAVENTRICULAR TACHYCARDIA (HCC): ICD-10-CM

## 2022-04-12 LAB — EKG IMPRESSION: NORMAL

## 2022-04-12 PROCEDURE — 99205 OFFICE O/P NEW HI 60 MIN: CPT | Performed by: INTERNAL MEDICINE

## 2022-04-12 PROCEDURE — 93000 ELECTROCARDIOGRAM COMPLETE: CPT | Performed by: INTERNAL MEDICINE

## 2022-04-12 ASSESSMENT — ENCOUNTER SYMPTOMS
DIZZINESS: 0
ORTHOPNEA: 0
CHILLS: 0
CLAUDICATION: 0
SHORTNESS OF BREATH: 0
COUGH: 0
PALPITATIONS: 1
SPUTUM PRODUCTION: 0
WEAKNESS: 0
LOSS OF CONSCIOUSNESS: 0
HEMOPTYSIS: 0
FEVER: 0
BRUISES/BLEEDS EASILY: 0
PND: 0
NEUROLOGICAL NEGATIVE: 1
STRIDOR: 0
MUSCULOSKELETAL NEGATIVE: 1
EYES NEGATIVE: 1
GASTROINTESTINAL NEGATIVE: 1
SORE THROAT: 0
CONSTITUTIONAL NEGATIVE: 1
RESPIRATORY NEGATIVE: 1
WHEEZING: 0

## 2022-04-12 ASSESSMENT — FIBROSIS 4 INDEX: FIB4 SCORE: 1.4

## 2022-04-12 NOTE — PROGRESS NOTES
Chief Complaint   Patient presents with   • Supraventricular Tachycardia (SVT)     NP       Subjective     Argelia Jimenez is a 71 y.o. female who presents today as a new consultation for atrial fibrillation.  She was wearing an apple watch and was feeling symptoms of her SVT.  It alarmed her that she was in A. fib.  She called her primary care physician who started her on metoprolol and she was already taking aspirin as needed.  She has high blood pressure.  She does not smoke drink or do drugs.    Past Medical History:   Diagnosis Date   • Anesthesia     severe PONV   • Arrhythmia     PAC's,PVC's   • Arthritis    • Bowel habit changes     constipation   • Cancer (HCC)     thyroid   • Chronic bilateral low back pain with bilateral sciatica    • Chronic use of opiate for therapeutic purpose    • GERD (gastroesophageal reflux disease)    • Heart burn    • History of thyroid cancer    • Hypertension    • Indigestion    • Insomnia    • PAC (premature atrial contraction)    • PONV (postoperative nausea and vomiting)    • Postsurgical hypothyroidism     total thyroidectomy   • PSVT (paroxysmal supraventricular tachycardia) (HCC)      Past Surgical History:   Procedure Laterality Date   • PB LAMINOTOMY,LUMBAR DISK,1 INTRSP N/A 3/3/2021    Procedure: LAMINECTOMY, SPINE, LUMBAR, WITH DISCECTOMY - L2-S1;  Surgeon: Robert Mcdaniel M.D.;  Location: SURGERY [facility];  Service: Neurosurgery   • FORAMINOTOMY N/A 3/3/2021    Procedure: FORAMINOTOMY, SPINE;  Surgeon: Robert Mcdaniel M.D.;  Location: [facility];  Service: Neurosurgery   • OTHER  12/2020    lipoma left arm   • COLONOSCOPY  2016    normal   • THYROIDECTOMY Right 1993   • THYROIDECTOMY Left 1990   • OTHER Left 1971    ruptured ovarian cyst/appendectomy   • APPENDECTOMY     • TONSILLECTOMY       Family History   Problem Relation Age of Onset   • Cancer Mother 60        breast cancer   • Heart Disease Sister         rapid heartbeat   • Cancer Brother          colon cancer   • Alcohol/Drug Brother         alcoholism   • No Known Problems Brother      Social History     Socioeconomic History   • Marital status:      Spouse name: Not on file   • Number of children: Not on file   • Years of education: Not on file   • Highest education level: Not on file   Occupational History   • Occupation:     Tobacco Use   • Smoking status: Never Smoker   • Smokeless tobacco: Never Used   Vaping Use   • Vaping Use: Never used   Substance and Sexual Activity   • Alcohol use: Yes     Comment: 2 beers, 2-3 vodka/week 8-10 per week   • Drug use: No   • Sexual activity: Yes     Partners: Male     Comment: 2 childen  (son and daughter)   Other Topics Concern   • Not on file   Social History Narrative   • Not on file     Social Determinants of Health     Financial Resource Strain: Not on file   Food Insecurity: Not on file   Transportation Needs: Not on file   Physical Activity: Not on file   Stress: Not on file   Social Connections: Not on file   Intimate Partner Violence: Not on file   Housing Stability: Not on file     No Known Allergies  Outpatient Encounter Medications as of 4/12/2022   Medication Sig Dispense Refill   • rivaroxaban (XARELTO) 20 MG Tab tablet Take 1 Tablet by mouth with dinner. 30 Tablet 11   • Fluticasone Propionate (FLONASE NA) Administer  into affected nostril(S).     • levothyroxine (SYNTHROID) 88 MCG Tab TAKE 1 TABLET BY MOUTH ONCE DAILY IN THE MORNING ON AN EMPTY STOMACH 100 Tablet 1   • METAMUCIL FIBER PO Take  by mouth.     • Plant Sterols and Stanols (CHOLESTOFF PO) Take  by mouth.     • meloxicam (MOBIC) 15 MG tablet Take 1 Tablet by mouth every day. 100 Tablet 1   • gabapentin (NEURONTIN) 300 MG Cap TAKE 1 CAPSULE BY MOUTH THREE TIMES DAILY 90 Capsule 5   • metoprolol SR (TOPROL XL) 25 MG TABLET SR 24 HR Take 1 tablet by mouth once daily 100 Tablet 0   • omeprazole (PRILOSEC) 40 MG delayed-release capsule Take 1 capsule by mouth every Monday, Wednesday,  and Friday. 100 capsule 1   • simethicone (MYLICON) 80 MG Chew Tab Chew 80 mg one time.     • D-MANNOSE PO Take 0.5 g by mouth 2 times a day.     • GARLIC OIL PO Take 1 tablet by mouth 2 times a day.     • diphenhydrAMINE (BENADRYL) 25 MG Tab Take 25 mg by mouth 2 (two) times a day.     • HYDROcodone-acetaminophen (NORCO) 5-325 MG Tab per tablet Take 1 tablet by mouth every four hours as needed.     • Cyanocobalamin (VITAMIN B-12) 1000 MCG Tab Take 1,000 mcg by mouth every day.     • Omega-3 Fatty Acids (FISH OIL PO) Take 1 tablet by mouth.     • Multiple Minerals-Vitamins (CALCIUM-MAGNESIUM-ZINC-D3) Tab Take 1 tablet by mouth every day.     • BIOTIN PO Take 1 tablet by mouth every day.     • aspirin (ASA) 81 MG Chew Tab chewable tablet Take 81 mg by mouth every 48 hours.     • [DISCONTINUED] ECHINACEA PO Take 1 tablet by mouth 1 time a day as needed. With propolis  (Patient not taking: No sig reported)     • [DISCONTINUED] estradiol (ESTRACE) 0.1 MG/GM vaginal cream Insert 2 to 4 g daily intravaginally for 1 to 2 weeks, then gradually reduce to 1/2 the initial dose for 1 to 2 weeks, followed by a maintenance dose of 1 g 1 to 3 times per week (Patient taking differently: Insert 1 g into the vagina every 48 hours.) 42.5 g 0     No facility-administered encounter medications on file as of 4/12/2022.     Review of Systems   Constitutional: Negative.  Negative for chills, fever and malaise/fatigue.   HENT: Negative.  Negative for sore throat.    Eyes: Negative.    Respiratory: Negative.  Negative for cough, hemoptysis, sputum production, shortness of breath, wheezing and stridor.    Cardiovascular: Positive for palpitations. Negative for chest pain, orthopnea, claudication, leg swelling and PND.   Gastrointestinal: Negative.    Genitourinary: Negative.    Musculoskeletal: Negative.    Skin: Negative.    Neurological: Negative.  Negative for dizziness, loss of consciousness and weakness.   Endo/Heme/Allergies: Negative.   "Does not bruise/bleed easily.   All other systems reviewed and are negative.             Objective     /76 (BP Location: Left arm, Patient Position: Sitting, BP Cuff Size: Adult)   Pulse 68   Resp 16   Ht 1.676 m (5' 6\")   Wt 69.9 kg (154 lb)   SpO2 97%   BMI 24.86 kg/m²     Physical Exam  Vitals and nursing note reviewed.   Constitutional:       General: She is not in acute distress.     Appearance: She is well-developed. She is not diaphoretic.   HENT:      Head: Normocephalic and atraumatic.      Right Ear: External ear normal.      Left Ear: External ear normal.      Nose: Nose normal.      Mouth/Throat:      Pharynx: No oropharyngeal exudate.   Eyes:      General: No scleral icterus.        Right eye: No discharge.         Left eye: No discharge.      Conjunctiva/sclera: Conjunctivae normal.      Pupils: Pupils are equal, round, and reactive to light.   Neck:      Vascular: No JVD.   Cardiovascular:      Rate and Rhythm: Normal rate and regular rhythm.      Heart sounds: No murmur heard.    No friction rub. No gallop.   Pulmonary:      Effort: Pulmonary effort is normal. No respiratory distress.      Breath sounds: No stridor. No wheezing or rales.   Chest:      Chest wall: No tenderness.   Abdominal:      General: There is no distension.      Palpations: Abdomen is soft.      Tenderness: There is no guarding.   Musculoskeletal:         General: No tenderness or deformity. Normal range of motion.      Cervical back: Neck supple.   Skin:     General: Skin is warm and dry.      Coloration: Skin is not pale.      Findings: No erythema or rash.   Neurological:      Mental Status: She is alert.      Cranial Nerves: No cranial nerve deficit.      Motor: No abnormal muscle tone.      Coordination: Coordination normal.      Deep Tendon Reflexes: Reflexes are normal and symmetric. Reflexes normal.   Psychiatric:         Behavior: Behavior normal.         Thought Content: Thought content normal.         " Judgment: Judgment normal.            Echocardiogram: Dated 12/17/2018 personally reviewed and interpreted by myself showing normal LV systolic function with no valvular heart disease.    EKG: Dated 11/23/2020 personally reviewed and interpreted myself showing normal sinus rhythm otherwise normal ECG.    Lab Results   Component Value Date/Time    CHOLSTRLTOT 217 (H) 02/22/2022 10:05 AM     (H) 02/22/2022 10:05 AM    HDL 76 02/22/2022 10:05 AM    TRIGLYCERIDE 92 02/22/2022 10:05 AM       Lab Results   Component Value Date/Time    SODIUM 136 02/22/2022 10:05 AM    POTASSIUM 4.7 02/22/2022 10:05 AM    CHLORIDE 103 02/22/2022 10:05 AM    CO2 23 02/22/2022 10:05 AM    GLUCOSE 87 02/22/2022 10:05 AM    BUN 16 02/22/2022 10:05 AM    CREATININE 0.78 02/22/2022 10:05 AM     Lab Results   Component Value Date/Time    ALKPHOSPHAT 77 02/22/2022 10:05 AM    ASTSGOT 26 02/22/2022 10:05 AM    ALTSGPT 16 02/22/2022 10:05 AM    TBILIRUBIN 0.4 02/22/2022 10:05 AM          Assessment & Plan     1. PSVT (paroxysmal supraventricular tachycardia) (HCC)  EKG    rivaroxaban (XARELTO) 20 MG Tab tablet    CT-CARDIAC SCORING   2. PAC (premature atrial contraction)  rivaroxaban (XARELTO) 20 MG Tab tablet    CT-CARDIAC SCORING   3. Dyslipidemia  rivaroxaban (XARELTO) 20 MG Tab tablet    CT-CARDIAC SCORING   4. Paroxysmal atrial fibrillation (HCC)  rivaroxaban (XARELTO) 20 MG Tab tablet    CT-CARDIAC SCORING       Medical Decision Making: Today's Assessment/Status/Plan:        71-year-old female with new onset atrial fibrillation.  We discussed the risk benefits alternatives of oral anticoagulation.  I will start her on Xarelto.  She will check the copayment with her pharmacy.  It is too expensive she will call us.  Otherwise she will continue on the metoprolol.  We discussed oral anticoagulation but she is having it so infrequently I would not recommend this for now.  We will see her back in 6 months.  I will get a calcium score in the  meantime.

## 2022-04-13 ENCOUNTER — PATIENT MESSAGE (OUTPATIENT)
Dept: CARDIOLOGY | Facility: MEDICAL CENTER | Age: 72
End: 2022-04-13
Payer: MEDICARE

## 2022-04-13 DIAGNOSIS — E78.5 DYSLIPIDEMIA: ICD-10-CM

## 2022-04-13 DIAGNOSIS — I49.1 PAC (PREMATURE ATRIAL CONTRACTION): ICD-10-CM

## 2022-04-13 DIAGNOSIS — I48.0 PAROXYSMAL ATRIAL FIBRILLATION (HCC): ICD-10-CM

## 2022-04-13 DIAGNOSIS — I47.10 PAROXYSMAL SUPRAVENTRICULAR TACHYCARDIA (HCC): ICD-10-CM

## 2022-04-15 NOTE — PATIENT INSTRUCTIONS
Patient instructions given regarding labs, x-rays, medications, referral and followup appointment.   Pt informed of normal pathology results from hysterectomy. Pt states that she is doing well.  Pt has no additional questions at this time.

## 2022-04-20 NOTE — PATIENT COMMUNICATION
New Xarelto RX printed for RO to sign and fax to [facility] [phone],

## 2022-04-25 RX ORDER — METOPROLOL SUCCINATE 25 MG/1
25 TABLET, EXTENDED RELEASE ORAL DAILY
Qty: 100 TABLET | Refills: 1 | Status: SHIPPED | OUTPATIENT
Start: 2022-04-25 | End: 2022-09-14 | Stop reason: SDUPTHER

## 2022-05-05 ENCOUNTER — HOSPITAL ENCOUNTER (OUTPATIENT)
Dept: RADIOLOGY | Facility: MEDICAL CENTER | Age: 72
End: 2022-05-05
Attending: INTERNAL MEDICINE
Payer: COMMERCIAL

## 2022-05-05 DIAGNOSIS — I48.0 PAROXYSMAL ATRIAL FIBRILLATION (HCC): ICD-10-CM

## 2022-05-05 DIAGNOSIS — I47.10 PAROXYSMAL SUPRAVENTRICULAR TACHYCARDIA (HCC): ICD-10-CM

## 2022-05-05 DIAGNOSIS — E78.5 DYSLIPIDEMIA: ICD-10-CM

## 2022-05-05 DIAGNOSIS — I49.1 PAC (PREMATURE ATRIAL CONTRACTION): ICD-10-CM

## 2022-05-05 DIAGNOSIS — N89.8 VAGINAL DRYNESS: ICD-10-CM

## 2022-05-05 PROCEDURE — 4410556 CT-CARDIAC SCORING (SELF PAY ONLY)

## 2022-05-05 RX ORDER — ESTRADIOL 0.1 MG/G
1 CREAM VAGINAL DAILY
Qty: 42.5 G | Refills: 2 | Status: SHIPPED | OUTPATIENT
Start: 2022-05-05 | End: 2023-02-16 | Stop reason: SDUPTHER

## 2022-05-31 ENCOUNTER — TELEPHONE (OUTPATIENT)
Dept: CARDIOLOGY | Facility: MEDICAL CENTER | Age: 72
End: 2022-05-31
Payer: MEDICARE

## 2022-06-01 NOTE — TELEPHONE ENCOUNTER
Tremaine Connor M.D.  You 8 minutes ago (10:43 AM)         Hold for 48 hours prior otherwise she is moderate risk      Clearance faxed to [facility] [phone], transmission complete

## 2022-06-01 NOTE — TELEPHONE ENCOUNTER
Received stratification request from [facility] [phone]    Procedure: Colonoscopy with Deep sedation on 7/21/22    To SATISH. Ok to proceed and hold Xarelto 1 day prior to procedure?

## 2022-06-20 RX ORDER — MELOXICAM 15 MG/1
15 TABLET ORAL DAILY
Qty: 100 TABLET | Refills: 0 | Status: SHIPPED | OUTPATIENT
Start: 2022-06-20 | End: 2022-09-20

## 2022-07-06 ENCOUNTER — TELEPHONE (OUTPATIENT)
Dept: HEALTH INFORMATION MANAGEMENT | Facility: OTHER | Age: 72
End: 2022-07-06
Payer: MEDICARE

## 2022-08-01 RX ORDER — OMEPRAZOLE 40 MG/1
40 CAPSULE, DELAYED RELEASE ORAL
Qty: 100 CAPSULE | Refills: 1 | Status: SHIPPED | OUTPATIENT
Start: 2022-08-01 | End: 2022-12-27 | Stop reason: SDUPTHER

## 2022-08-09 PROBLEM — M51.369 DEGENERATIVE DISC DISEASE, LUMBAR: Status: ACTIVE | Noted: 2022-08-09

## 2022-08-09 PROBLEM — M51.36 DEGENERATIVE DISC DISEASE, LUMBAR: Status: ACTIVE | Noted: 2022-08-09

## 2022-08-09 PROBLEM — D68.69 SECONDARY HYPERCOAGULABLE STATE (HCC): Status: ACTIVE | Noted: 2022-08-09

## 2022-08-09 PROBLEM — F11.20 OPIOID DEPENDENCE (HCC): Status: ACTIVE | Noted: 2022-08-09

## 2022-08-25 ENCOUNTER — PHARMACY VISIT (OUTPATIENT)
Dept: PHARMACY | Facility: MEDICAL CENTER | Age: 72
End: 2022-08-25
Payer: MEDICARE

## 2022-08-25 PROCEDURE — RXMED WILLOW AMBULATORY MEDICATION CHARGE: Performed by: INTERNAL MEDICINE

## 2022-08-25 RX ORDER — BNT162B2 0.23 MG/2.25ML
INJECTION, SUSPENSION INTRAMUSCULAR
Qty: 0.3 ML | Refills: 0 | Status: SHIPPED | OUTPATIENT
Start: 2022-08-25 | End: 2022-09-13

## 2022-08-26 ENCOUNTER — DOCUMENTATION (OUTPATIENT)
Dept: HEALTH INFORMATION MANAGEMENT | Facility: OTHER | Age: 72
End: 2022-08-26
Payer: MEDICARE

## 2022-09-07 ENCOUNTER — HOSPITAL ENCOUNTER (OUTPATIENT)
Dept: LAB | Facility: MEDICAL CENTER | Age: 72
End: 2022-09-07
Attending: PHYSICIAN ASSISTANT
Payer: MEDICARE

## 2022-09-07 ENCOUNTER — HOSPITAL ENCOUNTER (OUTPATIENT)
Dept: LAB | Facility: MEDICAL CENTER | Age: 72
End: 2022-09-07
Attending: FAMILY MEDICINE
Payer: MEDICARE

## 2022-09-07 DIAGNOSIS — E89.0 POSTSURGICAL HYPOTHYROIDISM: ICD-10-CM

## 2022-09-07 DIAGNOSIS — I10 ESSENTIAL HYPERTENSION: ICD-10-CM

## 2022-09-07 DIAGNOSIS — E78.5 DYSLIPIDEMIA: ICD-10-CM

## 2022-09-07 LAB
ALBUMIN SERPL BCP-MCNC: 4.4 G/DL (ref 3.2–4.9)
ALBUMIN/GLOB SERPL: 1.8 G/DL
ALP SERPL-CCNC: 84 U/L (ref 30–99)
ALT SERPL-CCNC: 13 U/L (ref 2–50)
ANION GAP SERPL CALC-SCNC: 9 MMOL/L (ref 7–16)
AST SERPL-CCNC: 19 U/L (ref 12–45)
BASOPHILS # BLD AUTO: 1.5 % (ref 0–1.8)
BASOPHILS # BLD: 0.08 K/UL (ref 0–0.12)
BILIRUB SERPL-MCNC: 0.4 MG/DL (ref 0.1–1.5)
BUN SERPL-MCNC: 17 MG/DL (ref 8–22)
CALCIUM SERPL-MCNC: 9.7 MG/DL (ref 8.5–10.5)
CHLORIDE SERPL-SCNC: 102 MMOL/L (ref 96–112)
CHOLEST SERPL-MCNC: 234 MG/DL (ref 100–199)
CO2 SERPL-SCNC: 26 MMOL/L (ref 20–33)
CREAT SERPL-MCNC: 0.9 MG/DL (ref 0.5–1.4)
EOSINOPHIL # BLD AUTO: 0.19 K/UL (ref 0–0.51)
EOSINOPHIL NFR BLD: 3.6 % (ref 0–6.9)
ERYTHROCYTE [DISTWIDTH] IN BLOOD BY AUTOMATED COUNT: 49.7 FL (ref 35.9–50)
FASTING STATUS PATIENT QL REPORTED: NORMAL
GFR SERPLBLD CREATININE-BSD FMLA CKD-EPI: 68 ML/MIN/1.73 M 2
GLOBULIN SER CALC-MCNC: 2.5 G/DL (ref 1.9–3.5)
GLUCOSE SERPL-MCNC: 95 MG/DL (ref 65–99)
HCT VFR BLD AUTO: 42.6 % (ref 37–47)
HDLC SERPL-MCNC: 72 MG/DL
HGB BLD-MCNC: 14.3 G/DL (ref 12–16)
IMM GRANULOCYTES # BLD AUTO: 0.02 K/UL (ref 0–0.11)
IMM GRANULOCYTES NFR BLD AUTO: 0.4 % (ref 0–0.9)
LDLC SERPL CALC-MCNC: 149 MG/DL
LYMPHOCYTES # BLD AUTO: 2.05 K/UL (ref 1–4.8)
LYMPHOCYTES NFR BLD: 38.7 % (ref 22–41)
MCH RBC QN AUTO: 31.2 PG (ref 27–33)
MCHC RBC AUTO-ENTMCNC: 33.6 G/DL (ref 33.6–35)
MCV RBC AUTO: 93 FL (ref 81.4–97.8)
MONOCYTES # BLD AUTO: 0.61 K/UL (ref 0–0.85)
MONOCYTES NFR BLD AUTO: 11.5 % (ref 0–13.4)
NEUTROPHILS # BLD AUTO: 2.35 K/UL (ref 2–7.15)
NEUTROPHILS NFR BLD: 44.3 % (ref 44–72)
NRBC # BLD AUTO: 0 K/UL
NRBC BLD-RTO: 0 /100 WBC
PLATELET # BLD AUTO: 329 K/UL (ref 164–446)
PMV BLD AUTO: 10.1 FL (ref 9–12.9)
POTASSIUM SERPL-SCNC: 4.8 MMOL/L (ref 3.6–5.5)
PROT SERPL-MCNC: 6.9 G/DL (ref 6–8.2)
RBC # BLD AUTO: 4.58 M/UL (ref 4.2–5.4)
SODIUM SERPL-SCNC: 137 MMOL/L (ref 135–145)
TRIGL SERPL-MCNC: 64 MG/DL (ref 0–149)
TSH SERPL DL<=0.005 MIU/L-ACNC: 0.49 UIU/ML (ref 0.38–5.33)
WBC # BLD AUTO: 5.3 K/UL (ref 4.8–10.8)

## 2022-09-07 PROCEDURE — 80061 LIPID PANEL: CPT

## 2022-09-07 PROCEDURE — 84443 ASSAY THYROID STIM HORMONE: CPT

## 2022-09-07 PROCEDURE — 80053 COMPREHEN METABOLIC PANEL: CPT

## 2022-09-07 PROCEDURE — 86364 TISS TRNSGLTMNASE EA IG CLAS: CPT

## 2022-09-07 PROCEDURE — 82784 ASSAY IGA/IGD/IGG/IGM EACH: CPT

## 2022-09-07 PROCEDURE — 85025 COMPLETE CBC W/AUTO DIFF WBC: CPT

## 2022-09-07 PROCEDURE — 36415 COLL VENOUS BLD VENIPUNCTURE: CPT

## 2022-09-09 LAB
IGA SERPL-MCNC: 173 MG/DL (ref 68–408)
TTG IGA SER IA-ACNC: 2 U/ML (ref 0–3)

## 2022-09-13 ENCOUNTER — OFFICE VISIT (OUTPATIENT)
Dept: MEDICAL GROUP | Facility: PHYSICIAN GROUP | Age: 72
End: 2022-09-13
Payer: MEDICARE

## 2022-09-13 VITALS
HEART RATE: 63 BPM | BODY MASS INDEX: 23.95 KG/M2 | DIASTOLIC BLOOD PRESSURE: 84 MMHG | TEMPERATURE: 98.5 F | SYSTOLIC BLOOD PRESSURE: 118 MMHG | WEIGHT: 149 LBS | HEIGHT: 66 IN | OXYGEN SATURATION: 96 %

## 2022-09-13 DIAGNOSIS — I47.10 PAROXYSMAL SUPRAVENTRICULAR TACHYCARDIA (HCC): ICD-10-CM

## 2022-09-13 DIAGNOSIS — D68.69 SECONDARY HYPERCOAGULABLE STATE (HCC): ICD-10-CM

## 2022-09-13 DIAGNOSIS — E89.0 POSTSURGICAL HYPOTHYROIDISM: ICD-10-CM

## 2022-09-13 DIAGNOSIS — M51.36 DEGENERATIVE DISC DISEASE, LUMBAR: ICD-10-CM

## 2022-09-13 DIAGNOSIS — E78.5 DYSLIPIDEMIA: ICD-10-CM

## 2022-09-13 PROBLEM — R39.9 UTI SYMPTOMS: Status: RESOLVED | Noted: 2020-11-06 | Resolved: 2022-09-13

## 2022-09-13 PROCEDURE — 99214 OFFICE O/P EST MOD 30 MIN: CPT | Performed by: FAMILY MEDICINE

## 2022-09-13 RX ORDER — PREGABALIN 75 MG/1
CAPSULE ORAL
COMMUNITY
Start: 2022-09-07 | End: 2023-01-27

## 2022-09-13 RX ORDER — METHOCARBAMOL 750 MG/1
750 TABLET, FILM COATED ORAL 3 TIMES DAILY
COMMUNITY
Start: 2022-09-07

## 2022-09-13 ASSESSMENT — FIBROSIS 4 INDEX: FIB4 SCORE: 1.15

## 2022-09-13 NOTE — ASSESSMENT & PLAN NOTE
Chronic issue  Worsening  The 10-year ASCVD risk score (Sudarshanivon BARRERA Jr., et al., 2013) is: 13.4%  Not on statin

## 2022-09-13 NOTE — ASSESSMENT & PLAN NOTE
Chronic issue  Her iwatch claiming she has afib?  Started on xarelto by cards  To FU with cards tomorrow

## 2022-09-13 NOTE — PROGRESS NOTES
Subjective:     Chief Complaint   Patient presents with    Results    Establish Care       HPI:   Argelia presents today to discuss the following.    Dyslipidemia  Chronic issue  Worsening  The 10-year ASCVD risk score (Sudarshan HOLLY Jr., et al., 2013) is: 13.4%  Not on statin    PSVT (paroxysmal supraventricular tachycardia) (HCC)  Chronic issue  Her iwatch claiming she has afib?  Started on xarelto by cards  To FU with cards tomorrow    Degenerative disc disease, lumbar  Chronic issue  Previously managed by PCP on Norco    Postsurgical hypothyroidism  Chronic issue  On synthroid    Past Medical History:   Diagnosis Date    Anesthesia     severe PONV    Arrhythmia     PAC's,PVC's    Arthritis     Bowel habit changes     constipation    Cancer (HCC)     thyroid    Chronic bilateral low back pain with bilateral sciatica     Chronic use of opiate for therapeutic purpose     GERD (gastroesophageal reflux disease)     Heart burn     History of thyroid cancer     Hypertension     Indigestion     Insomnia     PAC (premature atrial contraction)     PONV (postoperative nausea and vomiting)     Postsurgical hypothyroidism     total thyroidectomy    PSVT (paroxysmal supraventricular tachycardia) (HCC)        Current Outpatient Medications Ordered in Epic   Medication Sig Dispense Refill    pregabalin (LYRICA) 75 MG Cap Hasnt started taking      methocarbamol (ROBAXIN) 750 MG Tab TAKE 1 TO 2 TABLETS BY MOUTH 3 TIMES A DAY AS NEEDED FOR SPASMS      omeprazole (PRILOSEC) 40 MG delayed-release capsule Take 1 Capsule by mouth every Monday, Wednesday, and Friday. 100 Capsule 1    meloxicam (MOBIC) 15 MG tablet Take 1 Tablet by mouth every day. 100 Tablet 0    estradiol (ESTRACE) 0.1 MG/GM vaginal cream Insert 1 g into the vagina every day. 42.5 g 2    metoprolol SR (TOPROL XL) 25 MG TABLET SR 24 HR Take 1 Tablet by mouth every day. 100 Tablet 1    rivaroxaban (XARELTO) 20 MG Tab tablet Take 1 Tablet by mouth with dinner. 100 Tablet 3     "Fluticasone Propionate (FLONASE NA) Administer  into affected nostril(S).      levothyroxine (SYNTHROID) 88 MCG Tab TAKE 1 TABLET BY MOUTH ONCE DAILY IN THE MORNING ON AN EMPTY STOMACH 100 Tablet 1    METAMUCIL FIBER PO Take  by mouth.      Plant Sterols and Stanols (CHOLESTOFF PO) Take  by mouth.      simethicone (MYLICON) 80 MG Chew Tab Chew 80 mg one time.      D-MANNOSE PO Take 0.5 g by mouth 2 times a day.      GARLIC OIL PO Take 1 tablet by mouth 2 times a day.      HYDROcodone-acetaminophen (NORCO) 5-325 MG Tab per tablet Take 1 tablet by mouth every four hours as needed.      Cyanocobalamin (VITAMIN B-12) 1000 MCG Tab Take 1,000 mcg by mouth every day.      Omega-3 Fatty Acids (FISH OIL PO) Take 1 tablet by mouth.      Multiple Minerals-Vitamins (CALCIUM-MAGNESIUM-ZINC-D3) Tab Take 1 tablet by mouth every day.      BIOTIN PO Take 1 tablet by mouth every day.      aspirin (ASA) 81 MG Chew Tab chewable tablet Take 81 mg by mouth every 48 hours.       No current [facility]-ordered facility-administered medications on file.       Allergies:  Patient has no known allergies.    Health Maintenance: Completed    ROS:  Gen: no fevers/chills, no changes in weight  Eyes: no changes in vision  Pulm: no sob, no cough  CV: no chest pain, no palpitations  GI: no nausea/vomiting, no diarrhea      Objective:     Exam:  /84 (BP Location: Left arm, Patient Position: Sitting, BP Cuff Size: Adult)   Pulse 63   Temp 36.9 °C (98.5 °F) (Temporal)   Ht 1.664 m (5' 5.5\")   Wt 67.6 kg (149 lb)   SpO2 96%   BMI 24.42 kg/m²  Body mass index is 24.42 kg/m².      Constitutional: Alert, no distress, well-groomed.  Skin: Warm, dry, good turgor, no rashes in visible areas.  Eye: Equal, round and reactive, conjunctiva clear, lids normal.  ENMT: Lips without lesions, good dentition, moist mucous membranes.  Neck: Trachea midline, no masses, no thyromegaly.  Respiratory: Unlabored respiratory effort, no cough.  MSK: Normal gait, moves all " extremities.  Neuro: Grossly non-focal.   Psych: Alert and oriented x3, normal affect and mood.        Assessment & Plan:     72 y.o. female with the following -     1. Dyslipidemia  Chronic condition.  Continue with lifestyle change.  To discuss with cardiology tomorrow about possible initiation of a statin therapy.    2. PSVT (paroxysmal supraventricular tachycardia) (HCC)  3. Secondary hypercoagulable state (HCC)  Chronic condition.  The patient likely has atrial fibrillation as well.  As such she was started on Xarelto by cardiology.  To follow-up with them tomorrow.    4. Degenerative disc disease, lumbar  Chronic condition.  Recommend she continues to follow-up with pain management.    5. Postsurgical hypothyroidism  Chronic condition.  Continue with levothyroxine.      Return in about 6 months (around 3/13/2023).          Please note that this dictation was created using voice recognition software. I have made every reasonable attempt to correct obvious errors, but I expect that there are errors of grammar and possibly content that I did not discover before finalizing the note.

## 2022-09-14 ENCOUNTER — OFFICE VISIT (OUTPATIENT)
Dept: CARDIOLOGY | Facility: MEDICAL CENTER | Age: 72
End: 2022-09-14
Payer: MEDICARE

## 2022-09-14 VITALS
WEIGHT: 149 LBS | HEART RATE: 62 BPM | BODY MASS INDEX: 23.95 KG/M2 | RESPIRATION RATE: 16 BRPM | HEIGHT: 66 IN | SYSTOLIC BLOOD PRESSURE: 130 MMHG | OXYGEN SATURATION: 96 % | DIASTOLIC BLOOD PRESSURE: 72 MMHG

## 2022-09-14 DIAGNOSIS — I49.1 PAC (PREMATURE ATRIAL CONTRACTION): ICD-10-CM

## 2022-09-14 DIAGNOSIS — I47.10 PAROXYSMAL SUPRAVENTRICULAR TACHYCARDIA (HCC): ICD-10-CM

## 2022-09-14 DIAGNOSIS — D68.69 SECONDARY HYPERCOAGULABLE STATE (HCC): ICD-10-CM

## 2022-09-14 DIAGNOSIS — F11.20 UNCOMPLICATED OPIOID DEPENDENCE (HCC): ICD-10-CM

## 2022-09-14 DIAGNOSIS — E78.5 DYSLIPIDEMIA: ICD-10-CM

## 2022-09-14 PROCEDURE — 99214 OFFICE O/P EST MOD 30 MIN: CPT | Performed by: INTERNAL MEDICINE

## 2022-09-14 RX ORDER — METOPROLOL SUCCINATE 25 MG/1
25 TABLET, EXTENDED RELEASE ORAL DAILY
Qty: 100 TABLET | Refills: 3 | Status: SHIPPED | OUTPATIENT
Start: 2022-09-14 | End: 2023-08-25 | Stop reason: SDUPTHER

## 2022-09-14 ASSESSMENT — ENCOUNTER SYMPTOMS
ORTHOPNEA: 0
BRUISES/BLEEDS EASILY: 0
RESPIRATORY NEGATIVE: 1
SORE THROAT: 0
PALPITATIONS: 1
MUSCULOSKELETAL NEGATIVE: 1
GASTROINTESTINAL NEGATIVE: 1
LOSS OF CONSCIOUSNESS: 0
WEAKNESS: 0
HEMOPTYSIS: 0
DIZZINESS: 0
WHEEZING: 0
SPUTUM PRODUCTION: 0
PND: 0
CHILLS: 0
FEVER: 0
NEUROLOGICAL NEGATIVE: 1
COUGH: 0
CONSTITUTIONAL NEGATIVE: 1
EYES NEGATIVE: 1
SHORTNESS OF BREATH: 0
STRIDOR: 0
CLAUDICATION: 0

## 2022-09-14 ASSESSMENT — FIBROSIS 4 INDEX: FIB4 SCORE: 1.15

## 2022-09-14 NOTE — PROGRESS NOTES
Chief Complaint   Patient presents with    Supraventricular Tachycardia (SVT)     F/v DX: PSVT (paroxysmal supraventricular tachycardia) (HCC)       Subjective     Argelia Jimenez is a 71 y.o. female who presents today as a new consultation for atrial fibrillation.  She was wearing an apple watch and was feeling symptoms of her SVT.  It alarmed her that she was in A. fib.      Since she was last seen she has a few times she woke up with SOB.  She is having no CP.  She is getting no palpations.    Past Medical History:   Diagnosis Date    Anesthesia     severe PONV    Arrhythmia     PAC's,PVC's    Arthritis     Bowel habit changes     constipation    Cancer (HCC)     thyroid    Chronic bilateral low back pain with bilateral sciatica     Chronic use of opiate for therapeutic purpose     GERD (gastroesophageal reflux disease)     Heart burn     History of thyroid cancer     Hypertension     Indigestion     Insomnia     PAC (premature atrial contraction)     PONV (postoperative nausea and vomiting)     Postsurgical hypothyroidism     total thyroidectomy    PSVT (paroxysmal supraventricular tachycardia) (HCC)      Past Surgical History:   Procedure Laterality Date    PB LAMINOTOMY,LUMBAR DISK,1 INTRSP N/A 3/3/2021    Procedure: LAMINECTOMY, SPINE, LUMBAR, WITH DISCECTOMY - L2-S1;  Surgeon: Robert Mcdaniel M.D.;  Location: [facility]GERY [facility];  Service: Neurosurgery    FORAMINOTOMY N/A 3/3/2021    Procedure: FORAMINOTOMY, SPINE;  Surgeon: Robert Mcdaniel M.D.;  Location: [facility]GERY [facility];  Service: Neurosurgery    OTHER  12/2020    lipoma left arm    COLONOSCOPY  2016    normal    THYROIDECTOMY Right 1993    THYROIDECTOMY Left 1990    OTHER Left 1971    ruptured ovarian cyst/appendectomy    APPENDECTOMY      TONSILLECTOMY       Family History   Problem Relation Age of Onset    Cancer Mother 60        breast cancer    Heart Disease Sister         rapid heartbeat    Cancer Brother         colon cancer    Alcohol/Drug  Brother         alcoholism    No Known Problems Brother      Social History     Socioeconomic History    Marital status:      Spouse name: Not on file    Number of children: Not on file    Years of education: Not on file    Highest education level: Not on file   Occupational History    Occupation:     Tobacco Use    Smoking status: Never    Smokeless tobacco: Never   Vaping Use    Vaping Use: Never used   Substance and Sexual Activity    Alcohol use: Yes     Comment: 2 beers, 2-3 vodka/week 8-10 per week    Drug use: No    Sexual activity: Yes     Partners: Male     Comment: 2 childen  (son and daughter)   Other Topics Concern    Not on file   Social History Narrative    Not on file     Social Determinants of Health     Financial Resource Strain: Not on file   Food Insecurity: Not on file   Transportation Needs: Not on file   Physical Activity: Not on file   Stress: Not on file   Social Connections: Not on file   Intimate Partner Violence: Not on file   Housing Stability: Not on file     No Known Allergies  Outpatient Encounter Medications as of 9/14/2022   Medication Sig Dispense Refill    metoprolol SR (TOPROL XL) 25 MG TABLET SR 24 HR Take 1 Tablet by mouth every day. 100 Tablet 3    pregabalin (LYRICA) 75 MG Cap Hasnt started taking      methocarbamol (ROBAXIN) 750 MG Tab TAKE 1 TO 2 TABLETS BY MOUTH 3 TIMES A DAY AS NEEDED FOR SPASMS      omeprazole (PRILOSEC) 40 MG delayed-release capsule Take 1 Capsule by mouth every Monday, Wednesday, and Friday. 100 Capsule 1    meloxicam (MOBIC) 15 MG tablet Take 1 Tablet by mouth every day. 100 Tablet 0    estradiol (ESTRACE) 0.1 MG/GM vaginal cream Insert 1 g into the vagina every day. 42.5 g 2    rivaroxaban (XARELTO) 20 MG Tab tablet Take 1 Tablet by mouth with dinner. 100 Tablet 3    Fluticasone Propionate (FLONASE NA) Administer  into affected nostril(S).      levothyroxine (SYNTHROID) 88 MCG Tab TAKE 1 TABLET BY MOUTH ONCE DAILY IN THE MORNING ON AN EMPTY  "STOMACH 100 Tablet 1    METAMUCIL FIBER PO Take  by mouth.      Plant Sterols and Stanols (CHOLESTOFF PO) Take  by mouth.      simethicone (MYLICON) 80 MG Chew Tab Chew 80 mg one time.      D-MANNOSE PO Take 0.5 g by mouth 2 times a day.      GARLIC OIL PO Take 1 tablet by mouth 2 times a day.      HYDROcodone-acetaminophen (NORCO) 5-325 MG Tab per tablet Take 1 tablet by mouth every four hours as needed.      Cyanocobalamin (VITAMIN B-12) 1000 MCG Tab Take 1,000 mcg by mouth every day.      Omega-3 Fatty Acids (FISH OIL PO) Take 1 tablet by mouth.      Multiple Minerals-Vitamins (CALCIUM-MAGNESIUM-ZINC-D3) Tab Take 1 tablet by mouth every day.      BIOTIN PO Take 1 tablet by mouth every day.      aspirin (ASA) 81 MG Chew Tab chewable tablet Take 81 mg by mouth every 48 hours.      [DISCONTINUED] metoprolol SR (TOPROL XL) 25 MG TABLET SR 24 HR Take 1 Tablet by mouth every day. 100 Tablet 1     No facility-administered encounter medications on file as of 9/14/2022.     Review of Systems   Constitutional: Negative.  Negative for chills, fever and malaise/fatigue.   HENT: Negative.  Negative for sore throat.    Eyes: Negative.    Respiratory: Negative.  Negative for cough, hemoptysis, sputum production, shortness of breath, wheezing and stridor.    Cardiovascular:  Positive for palpitations. Negative for chest pain, orthopnea, claudication, leg swelling and PND.   Gastrointestinal: Negative.    Genitourinary: Negative.    Musculoskeletal: Negative.    Skin: Negative.    Neurological: Negative.  Negative for dizziness, loss of consciousness and weakness.   Endo/Heme/Allergies: Negative.  Does not bruise/bleed easily.   All other systems reviewed and are negative.           Objective     /72 (BP Location: Left arm, Patient Position: Sitting, BP Cuff Size: Adult)   Pulse 62   Resp 16   Ht 1.664 m (5' 5.5\")   Wt 67.6 kg (149 lb)   SpO2 96%   BMI 24.42 kg/m²     Physical Exam  Vitals and nursing note reviewed. "   Constitutional:       General: She is not in acute distress.     Appearance: She is well-developed. She is not diaphoretic.   HENT:      Head: Normocephalic and atraumatic.      Right Ear: External ear normal.      Left Ear: External ear normal.      Nose: Nose normal.      Mouth/Throat:      Pharynx: No oropharyngeal exudate.   Eyes:      General: No scleral icterus.        Right eye: No discharge.         Left eye: No discharge.      Conjunctiva/sclera: Conjunctivae normal.      Pupils: Pupils are equal, round, and reactive to light.   Neck:      Vascular: No JVD.   Cardiovascular:      Rate and Rhythm: Normal rate and regular rhythm.      Heart sounds: No murmur heard.    No friction rub. No gallop.   Pulmonary:      Effort: Pulmonary effort is normal. No respiratory distress.      Breath sounds: No stridor. No wheezing or rales.   Chest:      Chest wall: No tenderness.   Abdominal:      General: There is no distension.      Palpations: Abdomen is soft.      Tenderness: There is no guarding.   Musculoskeletal:         General: No tenderness or deformity. Normal range of motion.      Cervical back: Neck supple.   Skin:     General: Skin is warm and dry.      Coloration: Skin is not pale.      Findings: No erythema or rash.   Neurological:      Mental Status: She is alert.      Cranial Nerves: No cranial nerve deficit.      Motor: No abnormal muscle tone.      Coordination: Coordination normal.      Deep Tendon Reflexes: Reflexes are normal and symmetric. Reflexes normal.   Psychiatric:         Behavior: Behavior normal.         Thought Content: Thought content normal.         Judgment: Judgment normal.          Echocardiogram: Dated 12/17/2018 personally reviewed and interpreted by myself showing normal LV systolic function with no valvular heart disease.    EKG: Dated 11/23/2020 personally reviewed and interpreted myself showing normal sinus rhythm otherwise normal ECG.    Lab Results   Component Value  Date/Time    CHOLSTRLTOT 234 (H) 09/07/2022 09:11 AM     (H) 09/07/2022 09:11 AM    HDL 72 09/07/2022 09:11 AM    TRIGLYCERIDE 64 09/07/2022 09:11 AM       Lab Results   Component Value Date/Time    SODIUM 137 09/07/2022 09:11 AM    POTASSIUM 4.8 09/07/2022 09:11 AM    CHLORIDE 102 09/07/2022 09:11 AM    CO2 26 09/07/2022 09:11 AM    GLUCOSE 95 09/07/2022 09:11 AM    BUN 17 09/07/2022 09:11 AM    CREATININE 0.90 09/07/2022 09:11 AM     Lab Results   Component Value Date/Time    ALKPHOSPHAT 84 09/07/2022 09:11 AM    ASTSGOT 19 09/07/2022 09:11 AM    ALTSGPT 13 09/07/2022 09:11 AM    TBILIRUBIN 0.4 09/07/2022 09:11 AM          Assessment & Plan     1. Dyslipidemia        2. Uncomplicated opioid dependence (HCC)  OVERNIGHT PULSE OXIMETRY      3. PAC (premature atrial contraction)  metoprolol SR (TOPROL XL) 25 MG TABLET SR 24 HR      4. PSVT (paroxysmal supraventricular tachycardia) (HCC)  OVERNIGHT PULSE OXIMETRY    metoprolol SR (TOPROL XL) 25 MG TABLET SR 24 HR      5. Secondary hypercoagulable state (HCC)  metoprolol SR (TOPROL XL) 25 MG TABLET SR 24 HR          Medical Decision Making: Today's Assessment/Status/Plan:        71-year-old female with new onset atrial fibrillation.  We discussed the risk benefits alternatives of oral anticoagulation.  I will start her on Xarelto.  I will send an OPO for her possible ARNAUD.  I refilled her metoprolol.  I will see her back in 6 months.

## 2022-09-15 ENCOUNTER — PATIENT MESSAGE (OUTPATIENT)
Dept: CARDIOLOGY | Facility: MEDICAL CENTER | Age: 72
End: 2022-09-15
Payer: MEDICARE

## 2022-09-15 DIAGNOSIS — Z00.00 WELL WOMAN EXAM (NO GYNECOLOGICAL EXAM): ICD-10-CM

## 2022-09-15 DIAGNOSIS — E78.5 DYSLIPIDEMIA: ICD-10-CM

## 2022-09-19 RX ORDER — SCOLOPAMINE TRANSDERMAL SYSTEM 1 MG/1
1 PATCH, EXTENDED RELEASE TRANSDERMAL
Qty: 4 PATCH | Refills: 3 | Status: SHIPPED | OUTPATIENT
Start: 2022-09-19 | End: 2023-10-31

## 2022-09-20 RX ORDER — MELOXICAM 15 MG/1
15 TABLET ORAL DAILY
Qty: 100 TABLET | Refills: 0 | Status: SHIPPED | OUTPATIENT
Start: 2022-09-20 | End: 2022-12-23

## 2022-10-10 RX ORDER — ROSUVASTATIN CALCIUM 10 MG/1
20 TABLET, COATED ORAL EVERY EVENING
Qty: 90 TABLET | Refills: 3 | Status: SHIPPED | OUTPATIENT
Start: 2022-10-10 | End: 2022-10-17

## 2022-10-10 NOTE — PATIENT COMMUNICATION
You  Tremaine Connor M.D. 3 weeks ago     To RO, do you want to initiate statin therapy? Thanks      Tremaine Connor M.D.  You 23 hours ago (8:44 AM)     Yes, I would recommend rosuva 10.

## 2022-10-14 DIAGNOSIS — E78.5 DYSLIPIDEMIA: ICD-10-CM

## 2022-10-14 NOTE — TELEPHONE ENCOUNTER
Is the patient due for a refill? Yes    Was the patient seen the past year? Yes    Date of last office visit: 9/14/22    Does the patient have an upcoming appointment?  No   If yes, When?     Provider to refill:RO    Does the patients insurance require a 100 day supply?  Yes

## 2022-10-17 DIAGNOSIS — E78.5 DYSLIPIDEMIA: ICD-10-CM

## 2022-10-17 RX ORDER — ROSUVASTATIN CALCIUM 10 MG/1
20 TABLET, COATED ORAL EVERY EVENING
Qty: 180 TABLET | Refills: 3 | Status: SHIPPED | OUTPATIENT
Start: 2022-10-17 | End: 2022-10-20

## 2022-10-19 NOTE — TELEPHONE ENCOUNTER
Is the patient due for a refill? No    Was the patient seen the past year? Yes    Date of last office visit: 9/14/22    Does the patient have an upcoming appointment?  No     Provider to refill:RO    Does the patients insurance require a 100 day supply?  Yes

## 2022-10-20 RX ORDER — ROSUVASTATIN CALCIUM 10 MG/1
10 TABLET, COATED ORAL EVERY EVENING
Qty: 100 TABLET | Refills: 3 | Status: SHIPPED | OUTPATIENT
Start: 2022-10-20 | End: 2023-06-22

## 2022-12-23 ENCOUNTER — HOSPITAL ENCOUNTER (OUTPATIENT)
Dept: RADIOLOGY | Facility: MEDICAL CENTER | Age: 72
End: 2022-12-23
Attending: FAMILY MEDICINE
Payer: MEDICARE

## 2022-12-23 DIAGNOSIS — Z12.31 VISIT FOR SCREENING MAMMOGRAM: ICD-10-CM

## 2022-12-23 PROCEDURE — 77063 BREAST TOMOSYNTHESIS BI: CPT

## 2022-12-23 RX ORDER — MELOXICAM 15 MG/1
15 TABLET ORAL DAILY
Qty: 100 TABLET | Refills: 0 | Status: SHIPPED | OUTPATIENT
Start: 2022-12-23 | End: 2022-12-23

## 2022-12-23 NOTE — TELEPHONE ENCOUNTER
Received request via: Pharmacy    Was the patient seen in the last year in this department? Yes    Does the patient have an active prescription (recently filled or refills available) for medication(s) requested? No    Does the patient have group home Plus and need 100 day supply (blood pressure, diabetes and cholesterol meds only)? Yes, quantity updated to 100 days

## 2022-12-27 NOTE — TELEPHONE ENCOUNTER
Received request via: Pharmacy    Was the patient seen in the last year in this department? Yes    Does the patient have an active prescription (recently filled or refills available) for medication(s) requested? No    Does the patient have [facility] and need 100 day supply (blood pressure, diabetes and cholesterol meds only)? Medication is not for cholesterol, blood pressure or diabetes

## 2022-12-28 RX ORDER — OMEPRAZOLE 40 MG/1
40 CAPSULE, DELAYED RELEASE ORAL
Qty: 100 CAPSULE | Refills: 3 | Status: SHIPPED | OUTPATIENT
Start: 2022-12-28 | End: 2024-01-19 | Stop reason: SDUPTHER

## 2022-12-29 RX ORDER — LEVOTHYROXINE SODIUM 88 UG/1
88 TABLET ORAL
Qty: 100 TABLET | Refills: 1 | Status: SHIPPED | OUTPATIENT
Start: 2022-12-29 | End: 2023-06-12

## 2022-12-29 NOTE — TELEPHONE ENCOUNTER
Received request via: Patient    Was the patient seen in the last year in this department? Yes    Does the patient have an active prescription (recently filled or refills available) for medication(s) requested? No    Does the patient have skilled nursing Plus and need 100 day supply (blood pressure, diabetes and cholesterol meds only)? Medication is not for cholesterol, blood pressure or diabetes

## 2023-01-05 ENCOUNTER — OFFICE VISIT (OUTPATIENT)
Dept: MEDICAL GROUP | Facility: PHYSICIAN GROUP | Age: 73
End: 2023-01-05
Payer: MEDICARE

## 2023-01-05 VITALS
SYSTOLIC BLOOD PRESSURE: 120 MMHG | TEMPERATURE: 98 F | BODY MASS INDEX: 23.78 KG/M2 | HEART RATE: 89 BPM | DIASTOLIC BLOOD PRESSURE: 72 MMHG | OXYGEN SATURATION: 95 % | WEIGHT: 148 LBS | HEIGHT: 66 IN

## 2023-01-05 DIAGNOSIS — R19.7 DIARRHEA, UNSPECIFIED TYPE: ICD-10-CM

## 2023-01-05 PROCEDURE — 99213 OFFICE O/P EST LOW 20 MIN: CPT | Performed by: FAMILY MEDICINE

## 2023-01-05 ASSESSMENT — FIBROSIS 4 INDEX: FIB4 SCORE: 1.15

## 2023-01-05 ASSESSMENT — PATIENT HEALTH QUESTIONNAIRE - PHQ9: CLINICAL INTERPRETATION OF PHQ2 SCORE: 0

## 2023-01-05 NOTE — ASSESSMENT & PLAN NOTE
New problem  Worsening over the past 2 weeks  It all started after 3 mo trip    Now using metamucil which has helped   She usually suffers from constipation   Also endorses bloatedness   Having about 2-3movements daily   Slowly improving   No blood in the stool

## 2023-01-05 NOTE — PROGRESS NOTES
Subjective:     Chief Complaint   Patient presents with    GI Problem     Gas, bloating, irregular bowl movements, x 2 weeks, started after 3 month trip, pt states this maybe food related    Medication Refill     Meloxicam        HPI:   Argelia presents today to discuss the following.    Diarrhea  New problem  Worsening over the past 2 weeks  It all started after 3 mo trip    Now using metamucil which has helped   She usually suffers from constipation   Also endorses bloatedness   Having about 2-3movements daily   Slowly improving   No blood in the stool     Past Medical History:   Diagnosis Date    Anesthesia     severe PONV    Arrhythmia     PAC's,PVC's    Arthritis     Bowel habit changes     constipation    Cancer (HCC)     thyroid    Chronic bilateral low back pain with bilateral sciatica     Chronic use of opiate for therapeutic purpose     GERD (gastroesophageal reflux disease)     Heart burn     History of thyroid cancer     Hypertension     Indigestion     Insomnia     PAC (premature atrial contraction)     PONV (postoperative nausea and vomiting)     Postsurgical hypothyroidism     total thyroidectomy    PSVT (paroxysmal supraventricular tachycardia) ([facility])        Current Outpatient Medications Ordered in Epic   Medication Sig Dispense Refill    levothyroxine (SYNTHROID) 88 MCG Tab Take 1 Tablet by mouth every morning on an empty stomach. 100 Tablet 1    rosuvastatin (CRESTOR) 10 MG Tab Take 1 Tablet by mouth every evening. 100 Tablet 3    scopolamine (TRANSDERM-SCOP, 1.5 MG,) 1 mg/72hr PATCH 72 HR Place 1 Patch on the skin every 72 hours. 4 Patch 3    metoprolol SR (TOPROL XL) 25 MG TABLET SR 24 HR Take 1 Tablet by mouth every day. 100 Tablet 3    pregabalin (LYRICA) 75 MG Cap Hasnt started taking      methocarbamol (ROBAXIN) 750 MG Tab TAKE 1 TO 2 TABLETS BY MOUTH 3 TIMES A DAY AS NEEDED FOR SPASMS      estradiol (ESTRACE) 0.1 MG/GM vaginal cream Insert 1 g into the vagina every day. 42.5 g 2    rivaroxaban  "(XARELTO) 20 MG Tab tablet Take 1 Tablet by mouth with dinner. 100 Tablet 3    Fluticasone Propionate (FLONASE NA) Administer  into affected nostril(S).      simethicone (MYLICON) 80 MG Chew Tab Chew 80 mg one time.      D-MANNOSE PO Take 0.5 g by mouth 2 times a day.      GARLIC OIL PO Take 1 tablet by mouth 2 times a day.      HYDROcodone-acetaminophen (NORCO) 5-325 MG Tab per tablet Take 1 tablet by mouth every four hours as needed.      Omega-3 Fatty Acids (FISH OIL PO) Take 1 tablet by mouth.      BIOTIN PO Take 1 tablet by mouth every day.      aspirin (ASA) 81 MG Chew Tab chewable tablet Take 81 mg by mouth every 48 hours.      omeprazole (PRILOSEC) 40 MG delayed-release capsule Take 1 Capsule by mouth every Monday, Wednesday, and Friday. 100 Capsule 3    METAMUCIL FIBER PO Take  by mouth.      Plant Sterols and Stanols (CHOLESTOFF PO) Take  by mouth.      Cyanocobalamin (VITAMIN B-12) 1000 MCG Tab Take 1,000 mcg by mouth every day. (Patient not taking: Reported on 1/5/2023)       No current [facility]-ordered facility-administered medications on file.       Allergies:  Patient has no known allergies.    Health Maintenance: Completed    ROS:  Gen: no fevers/chills, no changes in weight  Eyes: no changes in vision  Pulm: no sob, no cough  CV: no chest pain, no palpitations  GI: no nausea/vomiting,      Objective:     Exam:  /72 (BP Location: Left arm, Patient Position: Sitting, BP Cuff Size: Adult)   Pulse 89   Temp 36.7 °C (98 °F) (Temporal)   Ht 1.664 m (5' 5.5\")   Wt 67.1 kg (148 lb)   SpO2 95%   BMI 24.25 kg/m²  Body mass index is 24.25 kg/m².    Constitutional: Alert, no distress, well-groomed.  Skin: Warm, dry, good turgor, no rashes in visible areas.  Eye: Equal, round and reactive, conjunctiva clear, lids normal.  ENMT: Lips without lesions, good dentition, moist mucous membranes.  Neck: Trachea midline, no masses, no thyromegaly.  Respiratory: Unlabored respiratory effort, no cough.  MSK: Normal " gait, moves all extremities.  Neuro: Grossly non-focal.   Psych: Alert and oriented x3, normal affect and mood.        Assessment & Plan:     72 y.o. female with the following -     1. Diarrhea, unspecified type  New problem.  Unknown etiology and prognosis.  I suspect that she may be having overflow diarrhea.  She suffers from constipation at baseline.  At this time I would like to do a trial of MiraLAX, continue with Metamucil, and probiotics.  Return precautions recommended.      No follow-ups on file.    [facility] Form    Last edited 01/05/23 10:49 PST by Rogelio Santizo M.D.           Please note that this dictation was created using voice recognition software. I have made every reasonable attempt to correct obvious errors, but I expect that there are errors of grammar and possibly content that I did not discover before finalizing the note.

## 2023-01-25 DIAGNOSIS — M54.41 CHRONIC BILATERAL LOW BACK PAIN WITH BILATERAL SCIATICA: ICD-10-CM

## 2023-01-25 DIAGNOSIS — M54.42 CHRONIC BILATERAL LOW BACK PAIN WITH BILATERAL SCIATICA: ICD-10-CM

## 2023-01-25 DIAGNOSIS — G89.29 CHRONIC BILATERAL LOW BACK PAIN WITH BILATERAL SCIATICA: ICD-10-CM

## 2023-01-25 RX ORDER — GABAPENTIN 300 MG/1
300 CAPSULE ORAL 3 TIMES DAILY
Qty: 90 CAPSULE | Refills: 2 | Status: CANCELLED | OUTPATIENT
Start: 2023-01-25

## 2023-01-25 NOTE — TELEPHONE ENCOUNTER
Received request via: Pharmacy    Was the patient seen in the last year in this department? Yes    Does the patient have an active prescription (recently filled or refills available) for medication(s) requested? No    Does the patient have nursing home Plus and need 100 day supply (blood pressure, diabetes and cholesterol meds only)? Medication is not for cholesterol, blood pressure or diabetes

## 2023-01-26 ENCOUNTER — PATIENT MESSAGE (OUTPATIENT)
Dept: MEDICAL GROUP | Facility: PHYSICIAN GROUP | Age: 73
End: 2023-01-26
Payer: MEDICARE

## 2023-01-26 DIAGNOSIS — M54.41 CHRONIC BILATERAL LOW BACK PAIN WITH BILATERAL SCIATICA: ICD-10-CM

## 2023-01-26 DIAGNOSIS — M54.42 CHRONIC BILATERAL LOW BACK PAIN WITH BILATERAL SCIATICA: ICD-10-CM

## 2023-01-26 DIAGNOSIS — G89.29 CHRONIC BILATERAL LOW BACK PAIN WITH BILATERAL SCIATICA: ICD-10-CM

## 2023-01-27 RX ORDER — GABAPENTIN 300 MG/1
300 CAPSULE ORAL 3 TIMES DAILY
Qty: 90 CAPSULE | Refills: 2 | Status: SHIPPED | OUTPATIENT
Start: 2023-01-27 | End: 2023-04-20

## 2023-02-16 DIAGNOSIS — N89.8 VAGINAL DRYNESS: ICD-10-CM

## 2023-02-16 RX ORDER — ESTRADIOL 0.1 MG/G
1 CREAM VAGINAL DAILY
Qty: 42.5 G | Refills: 0 | Status: SHIPPED | OUTPATIENT
Start: 2023-02-16 | End: 2023-07-10

## 2023-03-21 ENCOUNTER — APPOINTMENT (RX ONLY)
Dept: URBAN - METROPOLITAN AREA CLINIC 20 | Facility: CLINIC | Age: 73
Setting detail: DERMATOLOGY
End: 2023-03-21

## 2023-03-21 DIAGNOSIS — L81.4 OTHER MELANIN HYPERPIGMENTATION: ICD-10-CM

## 2023-03-21 DIAGNOSIS — D18.0 HEMANGIOMA: ICD-10-CM

## 2023-03-21 DIAGNOSIS — L57.8 OTHER SKIN CHANGES DUE TO CHRONIC EXPOSURE TO NONIONIZING RADIATION: ICD-10-CM

## 2023-03-21 DIAGNOSIS — L82.1 OTHER SEBORRHEIC KERATOSIS: ICD-10-CM

## 2023-03-21 DIAGNOSIS — D22 MELANOCYTIC NEVI: ICD-10-CM

## 2023-03-21 PROBLEM — D22.5 MELANOCYTIC NEVI OF TRUNK: Status: ACTIVE | Noted: 2023-03-21

## 2023-03-21 PROBLEM — D18.01 HEMANGIOMA OF SKIN AND SUBCUTANEOUS TISSUE: Status: ACTIVE | Noted: 2023-03-21

## 2023-03-21 PROCEDURE — ? COUNSELING

## 2023-03-21 PROCEDURE — 99213 OFFICE O/P EST LOW 20 MIN: CPT

## 2023-03-21 ASSESSMENT — LOCATION DETAILED DESCRIPTION DERM
LOCATION DETAILED: RIGHT MID TEMPLE
LOCATION DETAILED: LEFT ANTERIOR PROXIMAL THIGH
LOCATION DETAILED: LEFT MEDIAL BREAST 7-8:00 REGION
LOCATION DETAILED: LEFT DISTAL DORSAL FOREARM
LOCATION DETAILED: RIGHT PROXIMAL PRETIBIAL REGION
LOCATION DETAILED: LEFT ANTERIOR PROXIMAL UPPER ARM
LOCATION DETAILED: RIGHT MID-UPPER BACK
LOCATION DETAILED: RIGHT INFERIOR UPPER BACK
LOCATION DETAILED: LEFT INFERIOR UPPER BACK
LOCATION DETAILED: RIGHT SUPERIOR MEDIAL UPPER BACK
LOCATION DETAILED: LEFT INFERIOR LATERAL NECK
LOCATION DETAILED: LEFT ANTERIOR DISTAL THIGH
LOCATION DETAILED: RIGHT ANTERIOR DISTAL THIGH
LOCATION DETAILED: LEFT DISTAL PRETIBIAL REGION
LOCATION DETAILED: LEFT INFERIOR CENTRAL MALAR CHEEK
LOCATION DETAILED: RIGHT DISTAL DORSAL FOREARM
LOCATION DETAILED: LEFT MEDIAL SUPERIOR CHEST
LOCATION DETAILED: RIGHT LATERAL MALAR CHEEK
LOCATION DETAILED: RIGHT ANTERIOR PROXIMAL UPPER ARM
LOCATION DETAILED: RIGHT CENTRAL MALAR CHEEK
LOCATION DETAILED: LEFT MID-UPPER BACK

## 2023-03-21 ASSESSMENT — LOCATION SIMPLE DESCRIPTION DERM
LOCATION SIMPLE: LEFT BREAST
LOCATION SIMPLE: LEFT FOREARM
LOCATION SIMPLE: LEFT ANTERIOR NECK
LOCATION SIMPLE: RIGHT CHEEK
LOCATION SIMPLE: LEFT UPPER ARM
LOCATION SIMPLE: RIGHT UPPER BACK
LOCATION SIMPLE: RIGHT PRETIBIAL REGION
LOCATION SIMPLE: LEFT CHEEK
LOCATION SIMPLE: RIGHT FOREARM
LOCATION SIMPLE: RIGHT THIGH
LOCATION SIMPLE: LEFT UPPER BACK
LOCATION SIMPLE: RIGHT TEMPLE
LOCATION SIMPLE: CHEST
LOCATION SIMPLE: LEFT PRETIBIAL REGION
LOCATION SIMPLE: RIGHT UPPER ARM
LOCATION SIMPLE: LEFT THIGH

## 2023-03-21 ASSESSMENT — LOCATION ZONE DERM
LOCATION ZONE: NECK
LOCATION ZONE: LEG
LOCATION ZONE: TRUNK
LOCATION ZONE: ARM
LOCATION ZONE: FACE

## 2023-03-24 ENCOUNTER — GYNECOLOGY VISIT (OUTPATIENT)
Dept: OBGYN | Facility: CLINIC | Age: 73
End: 2023-03-24
Payer: MEDICARE

## 2023-03-24 VITALS — WEIGHT: 149 LBS | SYSTOLIC BLOOD PRESSURE: 130 MMHG | DIASTOLIC BLOOD PRESSURE: 84 MMHG | BODY MASS INDEX: 24.42 KG/M2

## 2023-03-24 DIAGNOSIS — N81.10 PELVIC ORGAN PROLAPSE QUANTIFICATION STAGE 2 CYSTOCELE: ICD-10-CM

## 2023-03-24 PROCEDURE — 99203 OFFICE O/P NEW LOW 30 MIN: CPT | Performed by: STUDENT IN AN ORGANIZED HEALTH CARE EDUCATION/TRAINING PROGRAM

## 2023-03-24 ASSESSMENT — FIBROSIS 4 INDEX: FIB4 SCORE: 1.15

## 2023-03-24 NOTE — PROGRESS NOTES
New GYN Problem Note    CC:   Gynecologic Exam (Vaginal Discomfort )      HPI:   Patient is a 72 y.o.  who presents complaining of vaginal bulge.  She first noticed it about 6 months ago when she started using vaginal estrogen for recurrent UTIs.  She sandoval snot have much discomfort but it bothers her that she can feel it.  She has not had a UTI since starting the vaginal estrogen.  She also notices the bulge with sitting and when straining for a bowel movement. She has a long history of constipation and has been using metamucil and miralax more regularly.  She has very slight leakage rarely with coughing and straining as well, not overly bothersome. She is interested in all options for her vaginal bulge symptoms. Has not been sexually active in many years due to her 's diabetes.      GYNECOLOGIC HISTORY:   Current Sexual Activity: No  History of sexually transmitted diseases? No  Abnormal discharge? No     Menstrual History  Patient has been postmenopausal since 55yo      Clots or heavy flow: No  Intermenstrual bleeding/spotting: No  Sexual problems: No  Significant pelvic pain: No  Bothersome PMS: No  Bothersome menopausal symptoms: No     Contraception  N/A     Pap History  Last Pap: N/A  Hx Moderate or Severe Dysplasia : No     Sexually active:    Social History     Substance and Sexual Activity   Sexual Activity Not Currently    Partners: Male    Birth control/protection: Post-Menopausal    Comment: 2 childen  (son and daughter)       OBSTETRIC HISTORY:  OB History    Para Term  AB Living   2 2 2     2   SAB IAB Ectopic Molar Multiple Live Births             2      # Outcome Date GA Lbr Adebayo/2nd Weight Sex Delivery Anes PTL Lv   2 Term     F Vag-Spont   BURAK   1 Term     M Vag-Spont   BURAK       MEDICAL HISTORY:  Past Medical History:   Diagnosis Date    Anesthesia     severe PONV    Arrhythmia     PAC's,PVC's    Arthritis     Bowel habit changes     constipation    Cancer (HCC)      thyroid    Chronic bilateral low back pain with bilateral sciatica     Chronic use of opiate for therapeutic purpose     GERD (gastroesophageal reflux disease)     Heart burn     History of thyroid cancer     Hyperlipidemia     Hypertension     Indigestion     Insomnia     PAC (premature atrial contraction)     PONV (postoperative nausea and vomiting)     Postsurgical hypothyroidism     total thyroidectomy    PSVT (paroxysmal supraventricular tachycardia) (HCC)        SURGICAL HISTORY:  Past Surgical History:   Procedure Laterality Date    PB LAMINOTOMY,LUMBAR DISK,1 INTRSP N/A 3/3/2021    Procedure: LAMINECTOMY, SPINE, LUMBAR, WITH DISCECTOMY - L2-S1;  Surgeon: Robert Mcdaniel M.D.;  Location: SURGERY [facility];  Service: Neurosurgery    FORAMINOTOMY N/A 3/3/2021    Procedure: FORAMINOTOMY, SPINE;  Surgeon: Robert Mcdaniel M.D.;  Location: SURGERY [facility];  Service: Neurosurgery    OTHER  12/2020    lipoma left arm    COLONOSCOPY  2016    normal    THYROIDECTOMY Right 1993    THYROIDECTOMY Left 1990    OTHER Left 1971    ruptured ovarian cyst/appendectomy    APPENDECTOMY      TONSILLECTOMY         FAMILY HISTORY:  Family History   Problem Relation Age of Onset    Cancer Mother 60        breast cancer    Heart Disease Sister         rapid heartbeat    Cancer Brother         colon cancer    Alcohol/Drug Brother         alcoholism    No Known Problems Brother        ALLERGIES / REACTIONS:  No Known Allergies    SOCIAL HISTORY:   reports that she has never smoked. She has never used smokeless tobacco. She reports current alcohol use. She reports that she does not use drugs.    ROS:   Positive ROS: none  Gen: no fevers or chills, no significant weight loss or gain, excessive fatigue  Respiratory:  no cough or dyspnea  Cardiac:  no chest pain, no palpitations, no syncope  Breast: no breast discharge, pain, lump or skin changes  GI:  no heartburn, no abdominal pain, no nausea or vomiting  Urinary: no dysuria, urgency,  frequency, incontinence   Psych: no depression or anxiety  Neuro: no migraines with aura, fainting spells, numbness or tingling  Extremities: no joint pain, persistently swollen ankles, recurrent leg cramps       PHYSICAL EXAMINATION:  Vital Signs:   Vitals:    23 1005   BP: 130/84   Weight: 149 lb     Body mass index is 24.42 kg/m².  Constitutional: The patient is well developed and well nourished.  Psychiatric: Patient is oriented to time place and person.   Skin: No rash observed.  Neck: Neck appears symmetric.  Respiratory: normal effort  Breast: no skin changes or palpable masses; normal bilaterally  Abdomen: Soft, non-tender.  Pelvic Exam:     External female genitalia: normal appearance    Urethra - no lesions, no erythema    Vagina: moist, pink, normal ruggae    Cervix: pink, smooth, no lesions, no CMT    Uterus - non-tender, normal size, shape, contour, mobile, anteverted    Ovaries: non-tender, no appreciable masses  Anterior and apical prolapse present with valsalva  Extremeties: Legs are symmetric and without tenderness. There is no edema present.    ASSESSMENT AND PLAN:  72 y.o.       1. Pelvic organ prolapse quantification stage 2 cystocele  -- patient with prolapse on exam   -- discussed nonsurgical and surgical options; desires referral to Dr. Patterson to discuss all options, she is open to all  -- up to date on mammogram - due in Dec 2023  -- colonoscopy normal last year  -- DEXA due 2025    - Referral to Urogynecology      Total Time: 35 minutes spent in chart review, exam, counseling and documentation  Jonas Metzger D.O.

## 2023-04-03 ENCOUNTER — OFFICE VISIT (OUTPATIENT)
Dept: MEDICAL GROUP | Facility: PHYSICIAN GROUP | Age: 73
End: 2023-04-03
Payer: MEDICARE

## 2023-04-03 VITALS
HEART RATE: 65 BPM | TEMPERATURE: 97.5 F | HEIGHT: 66 IN | DIASTOLIC BLOOD PRESSURE: 70 MMHG | WEIGHT: 149.8 LBS | RESPIRATION RATE: 16 BRPM | OXYGEN SATURATION: 97 % | SYSTOLIC BLOOD PRESSURE: 110 MMHG | BODY MASS INDEX: 24.08 KG/M2

## 2023-04-03 DIAGNOSIS — Z00.00 WELL WOMAN EXAM (NO GYNECOLOGICAL EXAM): ICD-10-CM

## 2023-04-03 DIAGNOSIS — M51.36 DEGENERATIVE DISC DISEASE, LUMBAR: ICD-10-CM

## 2023-04-03 DIAGNOSIS — I47.10 PAROXYSMAL SUPRAVENTRICULAR TACHYCARDIA (HCC): ICD-10-CM

## 2023-04-03 DIAGNOSIS — E78.5 DYSLIPIDEMIA: ICD-10-CM

## 2023-04-03 DIAGNOSIS — E89.0 POSTSURGICAL HYPOTHYROIDISM: ICD-10-CM

## 2023-04-03 DIAGNOSIS — F11.20 UNCOMPLICATED OPIOID DEPENDENCE (HCC): ICD-10-CM

## 2023-04-03 DIAGNOSIS — D68.69 SECONDARY HYPERCOAGULABLE STATE (HCC): ICD-10-CM

## 2023-04-03 PROCEDURE — 99214 OFFICE O/P EST MOD 30 MIN: CPT | Performed by: FAMILY MEDICINE

## 2023-04-03 RX ORDER — AZELASTINE HYDROCHLORIDE 0.5 MG/ML
1 SOLUTION/ DROPS OPHTHALMIC 2 TIMES DAILY
Qty: 6 ML | Refills: 3 | Status: SHIPPED | OUTPATIENT
Start: 2023-04-03 | End: 2023-05-11

## 2023-04-03 ASSESSMENT — FIBROSIS 4 INDEX: FIB4 SCORE: 1.15

## 2023-04-03 NOTE — PROGRESS NOTES
Subjective:     Chief Complaint   Patient presents with    Annual Exam       HPI:   Argelia presents today to discuss the following.    Dyslipidemia  Chronic issue  On crestor and doing well    Postsurgical hypothyroidism  Chronic issue  On synthroid     Degenerative disc disease, lumbar  Chronic issue  Seeing pain management for this  On norco PRN     Past Medical History:   Diagnosis Date    Anesthesia     severe PONV    Arrhythmia     PAC's,PVC's    Arthritis     Bowel habit changes     constipation    Cancer (HCC)     thyroid    Chronic bilateral low back pain with bilateral sciatica     Chronic use of opiate for therapeutic purpose     GERD (gastroesophageal reflux disease)     Heart burn     History of thyroid cancer     Hyperlipidemia     Hypertension     Indigestion     Insomnia     PAC (premature atrial contraction)     PONV (postoperative nausea and vomiting)     Postsurgical hypothyroidism     total thyroidectomy    PSVT (paroxysmal supraventricular tachycardia) ([facility])        Current Outpatient Medications Ordered in Epic   Medication Sig Dispense Refill    azelastine (OPTIVAR) 0.05 % ophthalmic solution Administer 1 Drop into both eyes 2 times a day. 6 mL 3    estradiol (ESTRACE) 0.1 MG/GM vaginal cream Insert 1 g into the vagina every day. 42.5 g 0    gabapentin (NEURONTIN) 300 MG Cap Take 1 Capsule by mouth 3 times a day. 90 Capsule 2    levothyroxine (SYNTHROID) 88 MCG Tab Take 1 Tablet by mouth every morning on an empty stomach. 100 Tablet 1    omeprazole (PRILOSEC) 40 MG delayed-release capsule Take 1 Capsule by mouth every Monday, Wednesday, and Friday. 100 Capsule 3    rosuvastatin (CRESTOR) 10 MG Tab Take 1 Tablet by mouth every evening. 100 Tablet 3    scopolamine (TRANSDERM-SCOP, 1.5 MG,) 1 mg/72hr PATCH 72 HR Place 1 Patch on the skin every 72 hours. 4 Patch 3    metoprolol SR (TOPROL XL) 25 MG TABLET SR 24 HR Take 1 Tablet by mouth every day. 100 Tablet 3    methocarbamol (ROBAXIN) 750 MG Tab  "TAKE 1 TO 2 TABLETS BY MOUTH 3 TIMES A DAY AS NEEDED FOR SPASMS      rivaroxaban (XARELTO) 20 MG Tab tablet Take 1 Tablet by mouth with dinner. 100 Tablet 3    Fluticasone Propionate (FLONASE NA) Administer  into affected nostril(S).      METAMUCIL FIBER PO Take  by mouth.      Plant Sterols and Stanols (CHOLESTOFF PO) Take  by mouth.      simethicone (MYLICON) 80 MG Chew Tab Chew 80 mg one time.      D-MANNOSE PO Take 0.5 g by mouth 2 times a day.      GARLIC OIL PO Take 1 tablet by mouth 2 times a day.      HYDROcodone-acetaminophen (NORCO) 5-325 MG Tab per tablet Take 1 tablet by mouth every four hours as needed.      Omega-3 Fatty Acids (FISH OIL PO) Take 1 tablet by mouth.       No current Epic-ordered facility-administered medications on file.       Allergies:  Patient has no known allergies.    Health Maintenance: Completed    ROS:  Gen: no fevers/chills, no changes in weight  Eyes: no changes in vision  Pulm: no sob, no cough  CV: no chest pain, no palpitations  GI: no nausea/vomiting, no diarrhea      Objective:     Exam:  /70   Pulse 65   Temp 36.4 °C (97.5 °F) (Temporal)   Resp 16   Ht 1.676 m (5' 6\")   Wt 67.9 kg (149 lb 12.8 oz)   SpO2 97%   BMI 24.18 kg/m²  Body mass index is 24.18 kg/m².    Gen: Alert and oriented, No apparent distress.  Eyes: PERRLA  Lungs: Normal effort, CTA bilaterally, no wheezes, rhonchi, or rales  CV: Regular rate and rhythm. No murmurs, rubs, or gallops.  Ext: No clubbing, cyanosis, edema.  Skin: no rash, lesions or ulcers  Neuro: Moves all extremities.  Psych: AAOx3        Assessment & Plan:     72 y.o. female with the following -     1. Well woman exam (no gynecological exam)  - CBC WITHOUT DIFFERENTIAL; Future  - Basic Metabolic Panel; Future  - HEMOGLOBIN A1C; Future  - Lipid Profile; Future  - TSH WITH REFLEX TO FT4; Future    2. Dyslipidemia  Chronic, stable condition.  Continue with statin therapy.    3. Postsurgical hypothyroidism  Chronic, stable " condition.  Continue with Synthroid.    4. Degenerative disc disease, lumbar  Chronic condition.  Continue with spine specialist.  Norco as needed.    5. Secondary hypercoagulable state (HCC)  Chronic condition.  Continue with Xarelto and cardiology follow-up.    6. Uncomplicated opioid dependence (HCC)  Above    7. Paroxysmal supraventricular tachycardia (HCC)  Chronic condition.  Stable at this time.  Continue with metoprolol and follow-up with cardiology.      Return in about 6 months (around 10/3/2023).    HCC Gap Form    Diagnosis to address: D68.69 - Secondary hypercoagulable state (HCC)  Assessment and plan: Chronic, stable. Continue with current defined treatment plan: stable. Follow-up at least annually.  Diagnosis: F11.20 - Uncomplicated opioid dependence (HCC)  Assessment and plan: Chronic, stable. Continue with current defined treatment plan: seeing pain mangement. Follow-up at least annually.  Diagnosis: I47.1 - Paroxysmal supraventricular tachycardia (HCC)  Assessment and plan: Chronic, stable. Continue with current defined treatment plan: seeing cards. Follow-up at least annually.  Last edited 04/03/23 11:39 PDT by Rogelio Santizo M.D.           Please note that this dictation was created using voice recognition software. I have made every reasonable attempt to correct obvious errors, but I expect that there are errors of grammar and possibly content that I did not discover before finalizing the note.

## 2023-04-13 DIAGNOSIS — I49.1 PAC (PREMATURE ATRIAL CONTRACTION): ICD-10-CM

## 2023-04-13 DIAGNOSIS — E78.5 DYSLIPIDEMIA: ICD-10-CM

## 2023-04-13 DIAGNOSIS — I47.10 PAROXYSMAL SUPRAVENTRICULAR TACHYCARDIA (HCC): ICD-10-CM

## 2023-04-13 DIAGNOSIS — I48.0 PAROXYSMAL ATRIAL FIBRILLATION (HCC): ICD-10-CM

## 2023-04-25 ENCOUNTER — OFFICE VISIT (OUTPATIENT)
Dept: CARDIOLOGY | Facility: MEDICAL CENTER | Age: 73
End: 2023-04-25
Payer: MEDICARE

## 2023-04-25 VITALS
BODY MASS INDEX: 23.63 KG/M2 | HEART RATE: 62 BPM | DIASTOLIC BLOOD PRESSURE: 78 MMHG | RESPIRATION RATE: 14 BRPM | HEIGHT: 66 IN | SYSTOLIC BLOOD PRESSURE: 116 MMHG | WEIGHT: 147 LBS | OXYGEN SATURATION: 98 %

## 2023-04-25 DIAGNOSIS — I47.10 PAROXYSMAL SUPRAVENTRICULAR TACHYCARDIA (HCC): ICD-10-CM

## 2023-04-25 DIAGNOSIS — I25.10 CORONARY ARTERY CALCIFICATION SEEN ON CAT SCAN: ICD-10-CM

## 2023-04-25 DIAGNOSIS — R00.2 PALPITATIONS: ICD-10-CM

## 2023-04-25 DIAGNOSIS — Z79.01 ANTICOAGULATED: ICD-10-CM

## 2023-04-25 DIAGNOSIS — E78.5 DYSLIPIDEMIA: ICD-10-CM

## 2023-04-25 PROCEDURE — 99214 OFFICE O/P EST MOD 30 MIN: CPT | Performed by: INTERNAL MEDICINE

## 2023-04-25 ASSESSMENT — ENCOUNTER SYMPTOMS
MYALGIAS: 0
DIZZINESS: 0
COUGH: 0
SHORTNESS OF BREATH: 0
PALPITATIONS: 1
LOSS OF CONSCIOUSNESS: 0

## 2023-04-25 ASSESSMENT — FIBROSIS 4 INDEX: FIB4 SCORE: 1.15

## 2023-04-25 NOTE — PROGRESS NOTES
"Chief Complaint   Patient presents with    Dyslipidemia    Supraventricular Tachycardia (SVT)     F/v Dx:PSVT (paroxysmal supraventricular tachycardia) (HCC)       Subjective     Argelia Jimenez is a 72 y.o. female who presents today follow-up cardiac care.    Previously followed by Dr. Tremaine Connor last seen 9/14/2022.    The patient has a reported history of palpitations for 30 years, and a reported history of SVT and PAF.  According to the patient and review of the medical records her diagnosis of atrial fibrillation at the time she saw Dr. Connor in consultation in 4/2022 and was based on Apple Watch personal cardiac monitor strips made the previous month in 3/2022.  She was continued on metoprolol and was started on Xarelto.  She did describe at that time episodes of sudden though brief episodes of palpitations with heart rates recorded up to \"150 bpm\".  Since her last appointment she has had a rare skipping like heartbeat but no sustained rapid heartbeats.  She also reports an occasional episode of nocturnal brief shortness of breath.  She denies angina pectoris or syncope.  She has had no bleeding problems or significant bruising since starting Xarelto.    Additionally she had a coronary calcium scan with a score of 74 and 5/5/2022 currently is on rosuvastatin 10 mg daily  She moved to [place] approximately 7 years ago with her  from [place] with a history of \"SVT\" and had been on metoprolol and aspirin.  She also has a history of thyroid cancer with partial lobectomies in 1990 1993 with I-131 in 1993 on chronic thyroid supplementation.  She is required lumbar surgery with ongoing back problems limiting her ability to exercise though she does Pilates.    Past Medical History:   Diagnosis Date    Anesthesia     severe PONV    Arrhythmia     PAC's,PVC's    Arthritis     Bowel habit changes     constipation    Cancer (HCC)     thyroid    Chronic bilateral low back pain with bilateral sciatica     " Chronic use of opiate for therapeutic purpose     GERD (gastroesophageal reflux disease)     Heart burn     History of thyroid cancer     Hyperlipidemia     Hypertension     Indigestion     Insomnia     PAC (premature atrial contraction)     PONV (postoperative nausea and vomiting)     Postsurgical hypothyroidism     total thyroidectomy    PSVT (paroxysmal supraventricular tachycardia) (HCC)      Past Surgical History:   Procedure Laterality Date    PB LAMINOTOMY,LUMBAR DISK,1 INTRSP N/A 3/3/2021    Procedure: LAMINECTOMY, SPINE, LUMBAR, WITH DISCECTOMY - L2-S1;  Surgeon: Robert Mcdaniel M.D.;  Location: SURGERY [facility];  Service: Neurosurgery    FORAMINOTOMY N/A 3/3/2021    Procedure: FORAMINOTOMY, SPINE;  Surgeon: Robert Mcdaniel M.D.;  Location: SURGERY [facility];  Service: Neurosurgery    OTHER  12/2020    lipoma left arm    COLONOSCOPY  2016    normal    THYROIDECTOMY Right 1993    THYROIDECTOMY Left 1990    OTHER Left 1971    ruptured ovarian cyst/appendectomy    APPENDECTOMY      TONSILLECTOMY       Family History   Problem Relation Age of Onset    Cancer Mother 60        breast cancer    Heart Disease Sister         rapid heartbeat    Cancer Brother         colon cancer    Alcohol/Drug Brother         alcoholism    No Known Problems Brother      Social History     Socioeconomic History    Marital status:      Spouse name: Not on file    Number of children: Not on file    Years of education: Not on file    Highest education level: Not on file   Occupational History    Occupation:     Tobacco Use    Smoking status: Never    Smokeless tobacco: Never   Vaping Use    Vaping Use: Never used   Substance and Sexual Activity    Alcohol use: Yes     Comment: 2 beers, 2-3 vodka/week 8-10 per week    Drug use: No    Sexual activity: Not Currently     Partners: Male     Birth control/protection: Post-Menopausal     Comment: 2 childen  (son and daughter)   Other Topics Concern    Not on file   Social History  Narrative    Not on file     Social Determinants of Health     Financial Resource Strain: Not on file   Food Insecurity: Not on file   Transportation Needs: Not on file   Physical Activity: Not on file   Stress: Not on file   Social Connections: Not on file   Intimate Partner Violence: Not on file   Housing Stability: Not on file     No Known Allergies  Outpatient Encounter Medications as of 4/25/2023   Medication Sig Dispense Refill    gabapentin (NEURONTIN) 300 MG Cap TAKE 1 CAPSULE BY MOUTH THREE TIMES A DAY 90 Capsule 3    azelastine (OPTIVAR) 0.05 % ophthalmic solution Administer 1 Drop into both eyes 2 times a day. 6 mL 3    estradiol (ESTRACE) 0.1 MG/GM vaginal cream Insert 1 g into the vagina every day. 42.5 g 0    levothyroxine (SYNTHROID) 88 MCG Tab Take 1 Tablet by mouth every morning on an empty stomach. 100 Tablet 1    omeprazole (PRILOSEC) 40 MG delayed-release capsule Take 1 Capsule by mouth every Monday, Wednesday, and Friday. 100 Capsule 3    rosuvastatin (CRESTOR) 10 MG Tab Take 1 Tablet by mouth every evening. 100 Tablet 3    scopolamine (TRANSDERM-SCOP, 1.5 MG,) 1 mg/72hr PATCH 72 HR Place 1 Patch on the skin every 72 hours. 4 Patch 3    metoprolol SR (TOPROL XL) 25 MG TABLET SR 24 HR Take 1 Tablet by mouth every day. 100 Tablet 3    methocarbamol (ROBAXIN) 750 MG Tab TAKE 1 TO 2 TABLETS BY MOUTH 3 TIMES A DAY AS NEEDED FOR SPASMS      rivaroxaban (XARELTO) 20 MG Tab tablet Take 1 Tablet by mouth with dinner. 100 Tablet 3    Fluticasone Propionate (FLONASE NA) Administer  into affected nostril(S).      METAMUCIL FIBER PO Take  by mouth.      simethicone (MYLICON) 80 MG Chew Tab Chew 80 mg one time.      D-MANNOSE PO Take 0.5 g by mouth 2 times a day.      GARLIC OIL PO Take 1 tablet by mouth 2 times a day.      HYDROcodone-acetaminophen (NORCO) 5-325 MG Tab per tablet Take 1 tablet by mouth every four hours as needed.      Omega-3 Fatty Acids (FISH OIL PO) Take 1 tablet by mouth.       "[DISCONTINUED] Plant Sterols and Stanols (CHOLESTOFF PO) Take  by mouth. (Patient not taking: Reported on 4/25/2023)       No facility-administered encounter medications on file as of 4/25/2023.     Review of Systems   Respiratory:  Negative for cough and shortness of breath.    Cardiovascular:  Positive for palpitations. Negative for chest pain.   Musculoskeletal:  Negative for myalgias.   Neurological:  Negative for dizziness and loss of consciousness.            Objective     /78 (BP Location: Left arm, Patient Position: Sitting, BP Cuff Size: Adult)   Pulse 62   Resp 14   Ht 1.676 m (5' 6\")   Wt 66.7 kg (147 lb)   SpO2 98%   BMI 23.73 kg/m²     Physical Exam  Vitals reviewed.   Constitutional:       General: She is not in acute distress.     Appearance: She is well-developed.   Eyes:      Conjunctiva/sclera: Conjunctivae normal.      Pupils: Pupils are equal, round, and reactive to light.   Neck:      Vascular: No JVD.   Cardiovascular:      Rate and Rhythm: Normal rate and regular rhythm.      Pulses:           Radial pulses are 1+ on the right side and 1+ on the left side.      Heart sounds: Normal heart sounds. No murmur heard.    No friction rub. No gallop.   Pulmonary:      Effort: Pulmonary effort is normal. No accessory muscle usage or respiratory distress.      Breath sounds: Normal breath sounds. No wheezing or rales.   Musculoskeletal:      Cervical back: Normal range of motion and neck supple.      Right lower leg: No edema.      Left lower leg: No edema.   Skin:     General: Skin is warm and dry.      Findings: No rash.      Nails: There is no clubbing.   Neurological:      Mental Status: She is alert and oriented to person, place, and time.   Psychiatric:         Behavior: Behavior normal.          ECHOCARDIOGRAM 12/14/2018  No prior study is available for comparison.   Left ventricular ejection fraction is visually estimated to be 55%.  Normal inferior vena cava size and inspiratory " "collapse.  Normal transthoracic echocardiogram.        Assessment & Plan     1. Palpitations        2. PSVT (paroxysmal supraventricular tachycardia) ([facility])        3. Coronary artery calcification seen on CAT scan        4. Dyslipidemia  LIPID PANEL      5. Anticoagulated            Medical Decision Making: Today's Assessment/Status/Plan:   Reported \"paroxysmal atrial fibrillation\".  I reviewed the available Apple Watch cardiac monitor strips from 3/2022 however the available strips show no evidence of atrial fibrillation rather ectopic atrial bradycardia with PACs.  I indicated the patient that these rhythm strips do not demonstrate atrial fibrillation.  The patient does report episodes of more rapid palpitations with heart rates up to 150 which potentially could have been atrial fibrillation but there is no documented rhythm strips that could be located to further elucidate or demonstrate any tachyarrhythmias.  After further discussion and because of the patient's \"fear of a stroke\" and the possibility of a lack of other confirmatory documentation for atrial fibrillation that cannot be located it was elected to continue Xarelto and metoprolol and have her wear her Apple Watch on a regular basis and record and send in any symptomatic episodes of her palpitations.  Will make further recommendations regarding anticoagulation based on future documentation  Concerning coronary calcification, currently no ischemic symptoms, recheck lipid panel on rosuvastatin 10 mg daily  RTC 6 months                     "

## 2023-05-06 NOTE — TELEPHONE ENCOUNTER
Called pt [phone]  Pt has enough medication until July 2023  Pt would like refill faxed to [facility]  Advised pt that SW will need to sign paper Rx before faxing. Pt verbalized understanding and is appreciative of call.

## 2023-05-10 DIAGNOSIS — I49.1 PAC (PREMATURE ATRIAL CONTRACTION): ICD-10-CM

## 2023-05-10 DIAGNOSIS — E78.5 DYSLIPIDEMIA: ICD-10-CM

## 2023-05-10 DIAGNOSIS — I47.10 PAROXYSMAL SUPRAVENTRICULAR TACHYCARDIA (HCC): ICD-10-CM

## 2023-05-10 DIAGNOSIS — I48.0 PAROXYSMAL ATRIAL FIBRILLATION (HCC): ICD-10-CM

## 2023-05-10 NOTE — TELEPHONE ENCOUNTER
Called pt [phone]  No answer, left DETAILED VM stating Rx was signed by SW and faxed per pt request. Questions? Can call [phone]      ------------------------------------------  Faxed Rx to [phone] per pt request  Confirmation status received  Scan to [facility]

## 2023-05-11 RX ORDER — AZELASTINE HYDROCHLORIDE 0.5 MG/ML
1 SOLUTION/ DROPS OPHTHALMIC 2 TIMES DAILY
Qty: 6 ML | Refills: 3 | Status: SHIPPED | OUTPATIENT
Start: 2023-05-11 | End: 2023-11-03

## 2023-06-01 NOTE — PROCEDURE: LIQUID NITROGEN
Pt last seen 12/14/22.    Yobani Bermudez is requesting a refill of:    Pending Prescriptions:                       Disp   Refills    ADVAIR DISKUS 250-50 MCG/ACT inhaler [Pha*60 each1            Sig: INHALE 1 PUFF INTO THE LUNGS 2 TIMES DAILY      
sent  
Medical Necessity Information: It is in your best interest to select a reason for this procedure from the list below. All of these items fulfill various CMS LCD requirements except the new and changing color options.
Post-Care Instructions: I reviewed with the patient in detail post-care instructions. Patient is to wear sunprotection, and avoid picking at any of the treated lesions. Pt may apply Vaseline to crusted or scabbing areas.
Render Post-Care Instructions In Note?: no
Medical Necessity Clause: This procedure was medically necessary because the lesions that were treated were:
Detail Level: Detailed
Consent: The patient's consent was obtained including but not limited to risks of crusting, scabbing, blistering, scarring, darker or lighter pigmentary change, recurrence, incomplete removal and infection.
Duration Of Freeze Thaw-Cycle (Seconds): 0

## 2023-06-15 ENCOUNTER — DOCUMENTATION (OUTPATIENT)
Dept: HEALTH INFORMATION MANAGEMENT | Facility: OTHER | Age: 73
End: 2023-06-15
Payer: MEDICARE

## 2023-06-15 ENCOUNTER — TELEPHONE (OUTPATIENT)
Dept: CARDIOLOGY | Facility: MEDICAL CENTER | Age: 73
End: 2023-06-15
Payer: MEDICARE

## 2023-06-15 ENCOUNTER — PATIENT MESSAGE (OUTPATIENT)
Dept: HEALTH INFORMATION MANAGEMENT | Facility: OTHER | Age: 73
End: 2023-06-15

## 2023-06-15 DIAGNOSIS — E78.5 DYSLIPIDEMIA: ICD-10-CM

## 2023-06-15 DIAGNOSIS — I49.1 PAC (PREMATURE ATRIAL CONTRACTION): ICD-10-CM

## 2023-06-15 DIAGNOSIS — I47.10 PAROXYSMAL SUPRAVENTRICULAR TACHYCARDIA (HCC): ICD-10-CM

## 2023-06-15 DIAGNOSIS — I48.0 PAROXYSMAL ATRIAL FIBRILLATION (HCC): ICD-10-CM

## 2023-06-15 NOTE — TELEPHONE ENCOUNTER
Caller: Argelia Jimenez     Medication Name and Dosage:  rivaroxaban (XARELTO) 20 MG Tab tablet [[id number]]    Medication amount left: n/a    Preferred Pharmacy: [facility]  Fax: [phone]    Other questions (Topic): Pt has enough medication but since she uses a [facility] they can take about 6 weeks and would like to make sure we send a new prescription by the end of the month.     Callback Number (Will only call for issues): [phone] (home) [phone] (work)     Thank You    Joanie HOWARD

## 2023-06-16 NOTE — TELEPHONE ENCOUNTER
RX printed, placed on AB (care team) desk for review and signature. Confirmed AB will come back to office later this afternoon.

## 2023-06-16 NOTE — TELEPHONE ENCOUNTER
Received signed RX from AB. Faxed to [facility], received receipt for confirmation. Scanned through [employer].

## 2023-06-22 ENCOUNTER — GYNECOLOGY VISIT (OUTPATIENT)
Dept: OBGYN | Facility: CLINIC | Age: 73
End: 2023-06-22
Payer: MEDICARE

## 2023-06-22 VITALS
WEIGHT: 146 LBS | DIASTOLIC BLOOD PRESSURE: 95 MMHG | BODY MASS INDEX: 23.46 KG/M2 | SYSTOLIC BLOOD PRESSURE: 154 MMHG | HEART RATE: 58 BPM | HEIGHT: 66 IN

## 2023-06-22 DIAGNOSIS — N95.2 ATROPHIC VAGINITIS: ICD-10-CM

## 2023-06-22 DIAGNOSIS — N81.6 RECTOCELE: ICD-10-CM

## 2023-06-22 DIAGNOSIS — N81.2 INCOMPLETE UTEROVAGINAL PROLAPSE: ICD-10-CM

## 2023-06-22 DIAGNOSIS — N39.46 URINARY INCONTINENCE, MIXED: Primary | ICD-10-CM

## 2023-06-22 DIAGNOSIS — N81.12 CYSTOCELE, LATERAL: ICD-10-CM

## 2023-06-22 LAB
APPEARANCE UR: NORMAL
BILIRUB UR STRIP-MCNC: NORMAL MG/DL
COLOR UR AUTO: YELLOW
GLUCOSE UR STRIP.AUTO-MCNC: NEGATIVE MG/DL
KETONES UR STRIP.AUTO-MCNC: NEGATIVE MG/DL
LEUKOCYTE ESTERASE UR QL STRIP.AUTO: NORMAL
NITRITE UR QL STRIP.AUTO: POSITIVE
PH UR STRIP.AUTO: 7 [PH] (ref 5–8)
PROT UR QL STRIP: NEGATIVE MG/DL
RBC UR QL AUTO: NEGATIVE
SP GR UR STRIP.AUTO: 1.01
UROBILINOGEN UR STRIP-MCNC: NORMAL MG/DL

## 2023-06-22 PROCEDURE — 81002 URINALYSIS NONAUTO W/O SCOPE: CPT | Performed by: STUDENT IN AN ORGANIZED HEALTH CARE EDUCATION/TRAINING PROGRAM

## 2023-06-22 PROCEDURE — 3080F DIAST BP >= 90 MM HG: CPT | Performed by: STUDENT IN AN ORGANIZED HEALTH CARE EDUCATION/TRAINING PROGRAM

## 2023-06-22 PROCEDURE — 99214 OFFICE O/P EST MOD 30 MIN: CPT | Performed by: STUDENT IN AN ORGANIZED HEALTH CARE EDUCATION/TRAINING PROGRAM

## 2023-06-22 PROCEDURE — 3077F SYST BP >= 140 MM HG: CPT | Performed by: STUDENT IN AN ORGANIZED HEALTH CARE EDUCATION/TRAINING PROGRAM

## 2023-06-22 RX ORDER — SIMVASTATIN 20 MG
20 TABLET ORAL DAILY
Qty: 100 TABLET | Refills: 1 | Status: SHIPPED | OUTPATIENT
Start: 2023-06-22 | End: 2023-08-10 | Stop reason: SDUPTHER

## 2023-06-22 ASSESSMENT — FIBROSIS 4 INDEX: FIB4 SCORE: 1.15

## 2023-06-22 NOTE — PROGRESS NOTES
Urogynecology and Pelvic Reconstructive Surgery Consultation Visit    Argelia Jimenez MRN:[id number] :1950    Referred by: Jonas Metzger DO    Reason for Visit:   Chief Complaint   Patient presents with    New Patient     Consult          Subjective     History of Presenting Illness:     Argelia Jimenez is a 72 y.o. year old P2 who was referred by Dr. Metzger for the evaluation and management of pelvic organ prolapse. Patient has a chronic history of constipation and recurrent UTIs. For the past 3 years she has been using topical estrogen with good relief of UTIs. However while applying the estrogen 7-8 months ago, she noticed a vaginal bulge. It has been progressively worsening. She was seen by Dr. Metzger in March for this issue, and states the bulge has increased in size since then. It is not painful, rather just uncomfortable, and she notices it while walking. Ms. Jimenez additionally has some mild urinary incontinence for 10+ years which does not bother her at this time. She has not noticed any change in her bowel or bladder symptoms with the prolapse.      She denies history of abnormal Pap smears.  She has not had postmenopausal bleeding since her D&C is in her mid 50s.     Prior Pelvic surgery:   Ruptured ovarian cyst (laparotomy)  Appendectmoy  2x D&C for menorrhagia from fibroids and polyps (early menopause)                Prior treatment:   Miralax  D-mannose and estrogen for UTI                Fluid intake:   Half a gallon     Pelvic floor symptom review:                 Bladder:              Voids per day: 6          Voids per night: 1 rarely                 Urinary incontinence episodes per day: 1-2               Urge leakage:  warm water trigger, but no incontinence              Stress leakage: With Cough and sneeze              Continuous / insensible urine loss: No               Nocturnal enuresis: No               Leakage volume: Drops              Number of pads/day: 1 pad               Bladder emptying: Complete              Voiding symptoms: Post-Void Dribble              UTI in last 12 months: last 3 years ago after starting d-mannose and estrogen              Other urologic history: none                 Prolapse:                Prolapse symptoms: Bulge, uncomfortable              Degree of prolapse: To Introitus              Duration of prolapse symptoms: 7-8 mo, intermittent, return                  Bowel:               Constipation: Yes, uses Miralax 2x weekly              Bowel movements per day: multiple small pellets a day 3-4x               Straining to empty bowels: Yes              Splinting to evacuate: Yes, not helping              Painful evacuation: No               Difficulty emptying rectum: Yes              Incontinence to stool: No              Blood in stool: Yes              Hemorrhoids: Yes              Bowel conditions: None, daughter with UC, brother diagnosed colon cancer in 40's              Most recent colonoscopy: <1 year ago, 1 polyp benign                  Sexual function:               Sexually active: No               Gender of partners: Male              Pain with intercourse: No                            Pelvic Pain: no        Past medical and surgical history     Past obstetric history              Number of vaginal deliveries: 2              Number of  deliveries: 0              History of vacuum/forceps: No               History of obstetric anal sphincter injury: No      Past gynecological history:               Last menstrual period: No LMP recorded. Patient is postmenopausal.              History of abnormal uterine bleeding: Yes              History of fibroids: Yes              History of endometrial polyps:  Yes              History of endometriosis: No               History of cervical dysplasia: No               Last pap:               Current contraception: None      Past medical history:  Past Medical History:   Diagnosis Date     Anesthesia     severe PONV    Arrhythmia     PAC's,PVC's    Arthritis     Bowel habit changes     constipation    Cancer (HCC)     thyroid    Chronic bilateral low back pain with bilateral sciatica     Chronic use of opiate for therapeutic purpose     GERD (gastroesophageal reflux disease)     Heart burn     History of thyroid cancer     Hyperlipidemia     Hypertension     Indigestion     Insomnia     PAC (premature atrial contraction)     PONV (postoperative nausea and vomiting)     Postsurgical hypothyroidism     total thyroidectomy    PSVT (paroxysmal supraventricular tachycardia) (HCC)     Urinary tract infection      Past surgical history:  Past Surgical History:   Procedure Laterality Date    PB LAMINOTOMY,LUMBAR DISK,1 INTRSP N/A 3/3/2021    Procedure: LAMINECTOMY, SPINE, LUMBAR, WITH DISCECTOMY - L2-S1;  Surgeon: Robert Mcdaniel M.D.;  Location: SURGERY [facility];  Service: Neurosurgery    FORAMINOTOMY N/A 3/3/2021    Procedure: FORAMINOTOMY, SPINE;  Surgeon: Robert Mcdaniel M.D.;  Location: SURGERY [facility];  Service: Neurosurgery    OTHER  12/2020    lipoma left arm    COLONOSCOPY  2016    normal    THYROIDECTOMY Right 1993    THYROIDECTOMY Left 1990    OTHER Left 1971    ruptured ovarian cyst/appendectomy    APPENDECTOMY      TONSILLECTOMY       Medications:has a current medication list which includes the following prescription(s): simvastatin, rivaroxaban, levothyroxine, azelastine, gabapentin, estradiol, omeprazole, scopolamine, metoprolol sr, methocarbamol, fluticasone propionate, fiber, simethicone, d-mannose, garlic, hydrocodone-acetaminophen, and omega-3 fatty acids.  Allergies:Patient has no known allergies.  Family history:  Family History   Problem Relation Age of Onset    Cancer Mother 60        breast cancer    Heart Disease Sister         rapid heartbeat    Cancer Brother         colon cancer    Alcohol/Drug Brother         alcoholism    No Known Problems Brother      Social history: reports that  "she has never smoked. She has never used smokeless tobacco. She reports current alcohol use. She reports that she does not use drugs.    Review of systems: A full review of systems was performed, and negative with the exception of want is noted above in the HPI.        Objective        BP (!) 154/95 (BP Location: Left arm, Patient Position: Sitting, BP Cuff Size: Adult)   Pulse (!) 58   Ht 5' 6\"   Wt 146 lb   BMI 23.57 kg/m²     Physical Exam  Vitals reviewed. Exam conducted with a chaperone present (MA - see notes.).   Constitutional:       Appearance: Normal appearance.   HENT:      Head: Normocephalic.      Mouth/Throat:      Mouth: Mucous membranes are moist.   Cardiovascular:      Rate and Rhythm: Normal rate.   Pulmonary:      Effort: Pulmonary effort is normal.   Abdominal:      Palpations: Abdomen is soft. There is no mass.      Tenderness: There is no abdominal tenderness.   Skin:     General: Skin is warm and dry.   Neurological:      Mental Status: She is alert.   Psychiatric:         Mood and Affect: Mood normal.         Genitourinary:    External female genitalia: WNL   Vulva: WNL   Bulbocavernosus reflex: Intact   Anal wink reflex: Intact   Perineal sensation: WNL   Urethra: Hypermobile   Vagina: Atrophic   Atrophy: Mild   Cough stress test: Negative    Pelvic floor:    POP-Q: Aa +0.5 / Ba +0.5 / TVL 9 / C -3 / D -4 / Ap -1 / Bp -1 / GH 4 / PB 2  Aa / Ba 0 with apex supported    Prolapse stage: 2   Paravaginal defect: bilateral mild   Cervical elongation: No    Urethral tenderness: No    Bladder/ suprapubic tenderness: No    Levator tenderness: None   Levator muscle tone: Hypotonic   Pelvic floor contraction strength (modified Oxford scale): 2=Weak   Pelvic floor contraction duration: Brief    Bimanual exam: Small, Mobile Uterus     Procedure Performed: No    Diagnostic test and records review:    Urine dipstick: + nitrites - asymptomatic     Post-void residual: 12 mL, performed by Bladder " Scanner    Labs: n/a    Radiology: n/a    Documentation reviewed: Prior EMR Records         Assessment & Plan     Argelia Jimenez is a 72 y.o. year old P2 with symptomatic stage II uterovaginal prolapse. We discussed my recommendations for further diagnosis and treatment at length today.     1. Incomplete uterovaginal prolapse  2. Cystocele, lateral  3. Rectocele  Ms. Jimenez has symptomatic pelvic organ prolapse, anterior predominant. I reviewed the clinical findings and discussed the pathogenesis extensively, including genetic tendency, aging, menopause and childbirth injuries. Also discussed options for management, including both nonsurgical and surgical options.   Nonsurgical options include both expectant management and pessary use. I discussed different types of pessary as well as pessary care. The patient can be fitted with a pessary device in the office by a physician and the pessary can either be removed regularly by the patient or left in place, returning to the office every 3 to 6 months for evaluation. I also discussed concomitant treatment with vaginal estrogen at least 2 times a week to help prevent vaginal erosions and infection.  She may consider this option  Surgical options include vaginal and abdominal reconstructive approaches, as well as obliterative approaches which close the vaginal canal.  I do not think she is the best obliterative candidate due to the asymmetry of her prolapse.  I reviewed that abdominal approaches would include both native tissue repair or a mesh-augmented sacralcolpopexy, which is usually performed through robotic-assisted laparoscopy or sometimes through a Pfannenstiel skin incision for more complex cases. Lightweight synthetic mesh augmentation via sacrocolpopexy is offered for abdominal approaches, which can reduce recurrence risk of prolapse but has a risk (1-3%) for mesh exposure. She is also a candidate for native tissue vaginal surgery which could include a  vaginal hysterectomy with vault suspension or uterine-sparing hysteropexy, anterior/posterior repair, and perineorrhaphy.  Based on exam, I think a sacrospinous hysteropexy will give an incomplete repair as it does not adequately support the anterior wall after reduction.  She would like to get the best left with a vaginal hysterectomy with native tissue repairs, or a robotic sacrocolpopexy.  She was counseled that a native tissue repair has a 20% long-term risk of recurrence requiring reintervention, compared to 5 to 10% with a mesh augmented sacrocolpopexy.  The overall recovery and risks of surgery were reviewed with her today.  I discussed the technical details including risks and benefits of each of these options with the patient at length. All questions were answered.  She opts think about her options, and will contact her office whether she would like to proceed with a pessary or for surgical planning.  She would plan surgery around her upcoming vacation        4. Urinary incontinence, mixed  She has a stress predominant incontinence syndrome, moderately bothersome at this time.  I counseled that if we move forward with treatment of prolapse she would undergo bladder testing to fully evaluate her bladder function and whether she is a candidate for an incontinence procedure concomitantly.  I generally recommend treating stress incontinence if it is bothersome at the time of her prolapse repair, as it can often worsen with prolapse reduction.  This will be tested preoperatively.  - POCT Urinalysis    5. Atrophic vaginitis  She has vaginal atrophy on examination. Reviewed risks, benefits, and indications for vaginal estrogen therapy.  Continue with vaginal estrogen therapy twice weekly.                Antoni Patterson MD, FACOG  Urogynecology and Pelvic Reconstructive Surgery  Department of Obstetrics and Gynecology  [facility]        This medical record contains text  that has been entered with the assistance of computer voice recognition and dictation software.  Therefore, it may contain unintended errors in text, spelling, punctuation, or grammar

## 2023-06-22 NOTE — PROGRESS NOTES
Urogynecology and Pelvic Reconstructive Surgery Consultation Visit    Argelia Jimenez MRN:[id number] :1950    Referred by: Dr. Metzger    Reason for Visit: Pelvic organ prolapse      Subjective     History of Presenting Illness:    Argelia Jimenez is a 72 y.o. year old P2 who was referred by Dr. Metzger for the evaluation and management of pelvic organ prolapse. Patient has a chronic history of constipation and recurrent UTIs. For the past 3 years she has been using topical estrogen with good relief of UTIs. However while applying the estrogen 7-8 months ago, she noticed a vaginal bulge. It has been progressively worsening. She was seen by Dr. Metzger in March for this issue, and states the bulge has increased in size since then. It is not painful, rather just uncomfortable, and she notices it while walking. Ms. Jimenez additionally has some mild urinary incontinence for 10+ years which does not bother her at this time. She has not noticed any change in her bowel or bladder symptoms with the prolapse. She has not tried physical therapy for her prolapse.      Prior Pelvic surgery:   Ruptured ovarian cyst  Appy  2 D&C for menorrhagia from fibroids and polyps     Prior treatment:   Miralax  D-mannose and estrogen for UTI     Fluid intake:   Half a gallon    Pelvic floor symptom review:     Bladder:   Voids per day: 6 Voids per night: 1 rarely      Urinary incontinence episodes per day: 1-2    Urge leakage:  warm water trigger, but no incontinence   Stress leakage: With Cough and sneeze   Continuous / insensible urine loss: No    Nocturnal enuresis: No    Leakage volume: Drops   Number of pads/day: 1 pad   Bladder emptying: Complete   Voiding symptoms: Post-Void Dribble   UTI in last 12 months: last 3 years ago after starting d-mannose and estrogen   Other urologic history: none      Prolapse:     Prolapse symptoms: Bulge, uncomfortable   Degree of prolapse: To Introitus   Duration of prolapse symptoms: 7-8  mo, intermittent, return      Bowel:    Constipation: Yes, uses Miralax 2x weekly   Bowel movements per day: multiple small pellets a day 3-4x    Straining to empty bowels: Yes   Splinting to evacuate: Yes, not helping   Painful evacuation: No    Difficulty emptying rectum: Yes   Incontinence to stool: No   Blood in stool: Yes   Hemorrhoids: Yes   Bowel conditions: None, daughter with UC, brother diagnosed colon cancer in 40's   Most recent colonoscopy: <1 year ago, 1 polyp benign       Sexual function:    Sexually active: No    Gender of partners: Male   Pain with intercourse: No       Pelvic Pain: no      Past medical and surgical history    Past obstetric history   Number of vaginal deliveries: 2   Number of  deliveries: 0   History of vacuum/forceps: No    History of obstetric anal sphincter injury: No     Past gynecological history:    Last menstrual period: No LMP recorded. Patient is postmenopausal.   History of abnormal uterine bleeding: Yes   History of fibroids: Yes   History of endometrial polyps:  Yes   History of endometriosis: No    History of cervical dysplasia: No    Last pap:    Current contraception: None      Past medical history:  Past Medical History:   Diagnosis Date    Anesthesia     severe PONV    Arrhythmia     PAC's,PVC's    Arthritis     Bowel habit changes     constipation    Cancer (HCC)     thyroid    Chronic bilateral low back pain with bilateral sciatica     Chronic use of opiate for therapeutic purpose     GERD (gastroesophageal reflux disease)     Heart burn     History of thyroid cancer     Hyperlipidemia     Hypertension     Indigestion     Insomnia     PAC (premature atrial contraction)     PONV (postoperative nausea and vomiting)     Postsurgical hypothyroidism     total thyroidectomy    PSVT (paroxysmal supraventricular tachycardia) (HCC)     Urinary tract infection      Past surgical history:  Past Surgical History:   Procedure Laterality Date    PB  "LAMINOTOMY,LUMBAR DISK,1 INTRSP N/A 3/3/2021    Procedure: LAMINECTOMY, SPINE, LUMBAR, WITH DISCECTOMY - L2-S1;  Surgeon: Robert Mcdaniel M.D.;  Location: SURGERY [facility];  Service: Neurosurgery    FORAMINOTOMY N/A 3/3/2021    Procedure: FORAMINOTOMY, SPINE;  Surgeon: Robert Mcdaniel M.D.;  Location: SURGERY [facility];  Service: Neurosurgery    OTHER  12/2020    lipoma left arm    COLONOSCOPY  2016    normal    THYROIDECTOMY Right 1993    THYROIDECTOMY Left 1990    OTHER Left 1971    ruptured ovarian cyst/appendectomy    APPENDECTOMY      TONSILLECTOMY       Medications:has a current medication list which includes the following prescription(s): simvastatin, rivaroxaban, levothyroxine, azelastine, gabapentin, estradiol, omeprazole, scopolamine, metoprolol sr, methocarbamol, fluticasone propionate, fiber, simethicone, d-mannose, garlic, hydrocodone-acetaminophen, and omega-3 fatty acids.  Allergies:Patient has no known allergies.  Family history:  Family History   Problem Relation Age of Onset    Cancer Mother 60        breast cancer    Heart Disease Sister         rapid heartbeat    Cancer Brother         colon cancer    Alcohol/Drug Brother         alcoholism    No Known Problems Brother      Social history: reports that she has never smoked. She has never used smokeless tobacco. She reports current alcohol use. She reports that she does not use drugs.    Review of systems: A full review of systems was performed, and negative with the exception of want is noted above in the HPI.        Objective        BP (!) 154/95 (BP Location: Left arm, Patient Position: Sitting, BP Cuff Size: Adult)   Pulse (!) 58   Ht 5' 6\"   Wt 146 lb   BMI 23.57 kg/m²     Physical Exam      Assessment & Plan     Argelia Jimenez is a 72 y.o. year old P2 with pelvic organ prolapse, mild mixed urinary incontinence, and history of recurrent UTI. We discussed my recommendations for further diagnosis and treatment at length today.     1. " Incomplete uterovaginal prolapse  2. Cystocele, lateral  3. Rectocele  Presents with 7-8 months of noticeable pelvic organ prolapse that has been worsening. History of chronic constipation. Physical exam as above. Discussed at length with her different conservative vs surgical management for her prolapse. Conservative options include physical therapy and pessary fitting. She endorsed some interest in pessary as she is not currently sexually active, however she would prefer a more permanent treatment. She was counseled on SSLF hysteropexy vs TVH/USLS vs sacrocolpopexy. Risks and benefits of each thoroughly discussed. With her degree of prolapse and symptoms, I recommended TVH/USLS to provide the greatest benefit. She will take some time to decide and will reach out. She will be going on a  vacation in September, so recommended due to the recovery period, that she could wait for surgery until she returns as we are likely booked out until the end of summer. Additionally discussed vaginal obliteration, however since her degree of prolapse is not severe, she would not be an ideal candidate for this.  -Will await her decision for pessary fitting vs surgery scheduling     4. Urinary incontinence, mixed  Long term history of mild mixed urinary incontinence. While this is still a small concern of hers, recommended that we wait until after prolapse repair to address this surgically, as symptoms could worsen after prolapse repair. Will want to obtain UDS at pre-op visit to guide further management and treatment. She understands and agrees to the plan of care.  - POCT Urinalysis    5. Atrophic vaginitis  6. Recurrent UTI  Exam as above. Encouraged continued use of topical estrogen, which will additionally continue to help prevent her UTIs. Has not had UTI for at least 3 years on topical estrogen and d-mannose. Suggested she could stop the d-mannose as most of the benefit is derived from the estrogen.  -Continue topical  estrogen      Antoni Patterson MD, FACOG  Urogynecology and Pelvic Reconstructive Surgery  Department of Obstetrics and Gynecology  [facility]        This medical record contains text that has been entered with the assistance of computer voice recognition and dictation software.  Therefore, it may contain unintended errors in text, spelling, punctuation, or grammar

## 2023-06-22 NOTE — PROGRESS NOTES
PT here today for consult   Ref for Cystocele   Hysterectomy? X  Good #:   PVR : 12  Pharmacy Verified

## 2023-07-07 DIAGNOSIS — N89.8 VAGINAL DRYNESS: ICD-10-CM

## 2023-07-10 RX ORDER — ESTRADIOL 0.1 MG/G
1 CREAM VAGINAL DAILY
Qty: 42.5 G | Refills: 0 | Status: SHIPPED | OUTPATIENT
Start: 2023-07-10 | End: 2023-08-15 | Stop reason: SDUPTHER

## 2023-07-28 ENCOUNTER — TELEPHONE (OUTPATIENT)
Dept: CARDIOLOGY | Facility: MEDICAL CENTER | Age: 73
End: 2023-07-28
Payer: MEDICARE

## 2023-07-28 DIAGNOSIS — I49.1 PAC (PREMATURE ATRIAL CONTRACTION): ICD-10-CM

## 2023-07-28 DIAGNOSIS — E78.5 DYSLIPIDEMIA: ICD-10-CM

## 2023-07-28 DIAGNOSIS — I48.0 PAROXYSMAL ATRIAL FIBRILLATION (HCC): ICD-10-CM

## 2023-07-28 DIAGNOSIS — I47.10 PAROXYSMAL SUPRAVENTRICULAR TACHYCARDIA (HCC): ICD-10-CM

## 2023-07-28 NOTE — TELEPHONE ENCOUNTER
SW    Caller: Argelia Jimenez    Medication Name and Dosage: rivaroxaban (XARELTO) 20 MG Tab tablet [[id number]] -Take 1 Tablet by mouth with dinner.  Faxed to v     Please call your pharmacy and have them send us a refill request or speak to a live representative, RX number may have changed.    Medication amount left: 1 week     Preferred Pharmacy: [facility] [phone] FAX    Other questions (Topic): PT says pharmacy hasnt received script for above RX     Callback Number (Will only call for issues): [phone] - Argelia   [phone]- Pharmacy Fax    Thank You  Nayla HENDERSON

## 2023-07-28 NOTE — TELEPHONE ENCOUNTER
Reviewed chart, per tele note on 6/15/16, Rx was printed, signed by AB, and faxed to [facility] at [phone]. Faxed order again directly from chart to same number.

## 2023-08-02 NOTE — TELEPHONE ENCOUNTER
Pham as triage nurse, can you please fax the printed Xarelto pharmacy to the fax number provided by pt. This will be the 3rd time it has been faxed. I pended the med order to make it easier for you to print. Thank you!!!

## 2023-08-02 NOTE — TELEPHONE ENCOUNTER
Prescription printed and signed by SHAYLEE. Faxed to requested number below. Forms scanned into [employer] for reference, completed status received.

## 2023-08-02 NOTE — TELEPHONE ENCOUNTER
SHAYLEE    Caller: Argelia Jimenez     Topic/issue: Pt spoke with the [facility] and they still do not have her (XARELTO) 20 MG Tab)  I confirmed the fax number and ask she reach out to see if they have another fax to use.     Callback Number: [phone] (home) [phone] (work)    [facility] [phone]     Thank You    Joanie HOWARD

## 2023-08-10 DIAGNOSIS — G89.29 CHRONIC BILATERAL LOW BACK PAIN WITH BILATERAL SCIATICA: ICD-10-CM

## 2023-08-10 DIAGNOSIS — M54.41 CHRONIC BILATERAL LOW BACK PAIN WITH BILATERAL SCIATICA: ICD-10-CM

## 2023-08-10 DIAGNOSIS — M54.42 CHRONIC BILATERAL LOW BACK PAIN WITH BILATERAL SCIATICA: ICD-10-CM

## 2023-08-11 RX ORDER — SIMVASTATIN 20 MG
20 TABLET ORAL DAILY
Qty: 100 TABLET | Refills: 0 | Status: SHIPPED | OUTPATIENT
Start: 2023-08-11 | End: 2023-08-15 | Stop reason: SDUPTHER

## 2023-08-11 RX ORDER — GABAPENTIN 300 MG/1
300 CAPSULE ORAL 3 TIMES DAILY
Qty: 90 CAPSULE | Refills: 0 | Status: SHIPPED | OUTPATIENT
Start: 2023-08-11 | End: 2024-02-15 | Stop reason: SDUPTHER

## 2023-08-15 ENCOUNTER — TELEPHONE (OUTPATIENT)
Dept: HEALTH INFORMATION MANAGEMENT | Facility: OTHER | Age: 73
End: 2023-08-15
Payer: MEDICARE

## 2023-08-15 DIAGNOSIS — Z00.00 WELL WOMAN EXAM (NO GYNECOLOGICAL EXAM): ICD-10-CM

## 2023-08-15 DIAGNOSIS — N89.8 VAGINAL DRYNESS: ICD-10-CM

## 2023-08-15 RX ORDER — SIMVASTATIN 20 MG
20 TABLET ORAL DAILY
Qty: 100 TABLET | Refills: 0 | Status: SHIPPED | OUTPATIENT
Start: 2023-08-15 | End: 2023-12-04 | Stop reason: SDUPTHER

## 2023-08-15 RX ORDER — ESTRADIOL 0.1 MG/G
1 CREAM VAGINAL DAILY
Qty: 42.5 G | Refills: 0 | Status: SHIPPED | OUTPATIENT
Start: 2023-08-15 | End: 2023-12-13 | Stop reason: SDUPTHER

## 2023-08-15 NOTE — TELEPHONE ENCOUNTER
1. Caller Name: Argelia Jimenez                          Call Back Number: [phone]        How would the patient prefer to be contacted with a response: Phone call do NOT leave a detailed message    2. SPECIFIC Action To Be Taken:  Lab Orders    3. Diagnosis/Clinical Reason for Request: Member will be traveling for 2 months, she would like new lab orders for when she comes back in October. Current lab orders  on 10/3/23.    4. Specialty & Provider Name/Lab/Imaging Location: [facility]    5. Is appointment scheduled for requested order/referral: no    Patient was not informed they will receive a return phone call from the office ONLY if there are any questions before processing their request. Advised to call back if they haven't received a call from the referral department in 5 days.

## 2023-08-15 NOTE — TELEPHONE ENCOUNTER
Received request via: Patient    Was the patient seen in the last year in this department? Yes    Does the patient have an active prescription (recently filled or refills available) for medication(s) requested?  Member will be traveling for 2 months, she will run out of meds during vacation    Does the patient have alf Plus and need 100 day supply (blood pressure, diabetes and cholesterol meds only)? Yes, quantity updated to 100 days

## 2023-08-25 DIAGNOSIS — I47.10 PAROXYSMAL SUPRAVENTRICULAR TACHYCARDIA (HCC): ICD-10-CM

## 2023-08-25 DIAGNOSIS — D68.69 SECONDARY HYPERCOAGULABLE STATE (HCC): ICD-10-CM

## 2023-08-25 DIAGNOSIS — I49.1 PAC (PREMATURE ATRIAL CONTRACTION): ICD-10-CM

## 2023-08-25 NOTE — TELEPHONE ENCOUNTER
Is the patient due for a refill? Yes    Was the patient seen the past year? Yes    Date of last office visit: 4/25/2023    Does the patient have an upcoming appointment?  Yes   If yes, When? 11/3/2023    Provider to refill:SHAYLEE DE JESUS out of office    Does the patients insurance require a 100 day supply?  Yes

## 2023-08-28 RX ORDER — METOPROLOL SUCCINATE 25 MG/1
25 TABLET, EXTENDED RELEASE ORAL DAILY
Qty: 100 TABLET | Refills: 2 | Status: SHIPPED | OUTPATIENT
Start: 2023-08-28

## 2023-09-06 ENCOUNTER — TELEPHONE (OUTPATIENT)
Dept: CARDIOLOGY | Facility: MEDICAL CENTER | Age: 73
End: 2023-09-06
Payer: MEDICARE

## 2023-09-06 NOTE — TELEPHONE ENCOUNTER
Last OV: 04.25.2023  Proposed Surgery: transforaminal epidural steroid injection   Surgery Date: TBD  Requesting Office Name: [facility]   Fax Number: [phone]  Preference of Location (default is surgery center unless specified by Cardiologist or [facility])  Prior Clearance Addressed: No      Anticoags/Antiplatelets: Xarelto  Outstanding Cardiac Imaging : No  Stent, Cardiac Devices, or Catheterization: No  Ablation, TAVR/Valve (including open heart), Cardioversion: No  Recent Cardiac Hospitalization: No            When: N/A  History (cardiac history):   Past Medical History:   Diagnosis Date    Anesthesia     severe PONV    Arrhythmia     PAC's,PVC's    Arthritis     Bowel habit changes     constipation    Cancer (HCC)     thyroid    Chronic bilateral low back pain with bilateral sciatica     Chronic use of opiate for therapeutic purpose     GERD (gastroesophageal reflux disease)     Heart burn     History of thyroid cancer     Hyperlipidemia     Hypertension     Indigestion     Insomnia     PAC (premature atrial contraction)     PONV (postoperative nausea and vomiting)     Postsurgical hypothyroidism     total thyroidectomy    PSVT (paroxysmal supraventricular tachycardia) (HCC)     Urinary tract infection              Surgical Clearance Letter Sent: YES   **Scan clearance request letter into [facility].**

## 2023-09-11 ENCOUNTER — DOCUMENTATION (OUTPATIENT)
Dept: HEALTH INFORMATION MANAGEMENT | Facility: OTHER | Age: 73
End: 2023-09-11
Payer: MEDICARE

## 2023-10-03 ENCOUNTER — HOSPITAL ENCOUNTER (OUTPATIENT)
Facility: MEDICAL CENTER | Age: 73
End: 2023-10-03
Attending: STUDENT IN AN ORGANIZED HEALTH CARE EDUCATION/TRAINING PROGRAM | Admitting: STUDENT IN AN ORGANIZED HEALTH CARE EDUCATION/TRAINING PROGRAM
Payer: MEDICARE

## 2023-10-23 ENCOUNTER — TELEPHONE (OUTPATIENT)
Dept: HEALTH INFORMATION MANAGEMENT | Facility: OTHER | Age: 73
End: 2023-10-23
Payer: MEDICARE

## 2023-10-23 ENCOUNTER — APPOINTMENT (OUTPATIENT)
Dept: ADMISSIONS | Facility: MEDICAL CENTER | Age: 73
End: 2023-10-23
Attending: STUDENT IN AN ORGANIZED HEALTH CARE EDUCATION/TRAINING PROGRAM
Payer: MEDICARE

## 2023-10-31 ENCOUNTER — PRE-ADMISSION TESTING (OUTPATIENT)
Dept: ADMISSIONS | Facility: MEDICAL CENTER | Age: 73
End: 2023-10-31
Attending: STUDENT IN AN ORGANIZED HEALTH CARE EDUCATION/TRAINING PROGRAM
Payer: MEDICARE

## 2023-11-03 ENCOUNTER — OFFICE VISIT (OUTPATIENT)
Dept: CARDIOLOGY | Facility: MEDICAL CENTER | Age: 73
End: 2023-11-03
Attending: INTERNAL MEDICINE
Payer: MEDICARE

## 2023-11-03 VITALS
HEIGHT: 66 IN | DIASTOLIC BLOOD PRESSURE: 72 MMHG | OXYGEN SATURATION: 97 % | RESPIRATION RATE: 14 BRPM | WEIGHT: 149 LBS | SYSTOLIC BLOOD PRESSURE: 102 MMHG | HEART RATE: 70 BPM | BODY MASS INDEX: 23.95 KG/M2

## 2023-11-03 DIAGNOSIS — Z79.01 ANTICOAGULATED: ICD-10-CM

## 2023-11-03 DIAGNOSIS — D68.69 SECONDARY HYPERCOAGULABLE STATE (HCC): ICD-10-CM

## 2023-11-03 DIAGNOSIS — I48.0 PAF (PAROXYSMAL ATRIAL FIBRILLATION) (HCC): ICD-10-CM

## 2023-11-03 DIAGNOSIS — E78.5 DYSLIPIDEMIA: ICD-10-CM

## 2023-11-03 DIAGNOSIS — R00.2 PALPITATIONS: ICD-10-CM

## 2023-11-03 DIAGNOSIS — I47.10 PAROXYSMAL SUPRAVENTRICULAR TACHYCARDIA (HCC): ICD-10-CM

## 2023-11-03 PROCEDURE — 99213 OFFICE O/P EST LOW 20 MIN: CPT | Performed by: INTERNAL MEDICINE

## 2023-11-03 PROCEDURE — 3078F DIAST BP <80 MM HG: CPT | Performed by: INTERNAL MEDICINE

## 2023-11-03 PROCEDURE — 99214 OFFICE O/P EST MOD 30 MIN: CPT | Performed by: INTERNAL MEDICINE

## 2023-11-03 PROCEDURE — 3074F SYST BP LT 130 MM HG: CPT | Performed by: INTERNAL MEDICINE

## 2023-11-03 ASSESSMENT — ENCOUNTER SYMPTOMS
DIZZINESS: 0
PALPITATIONS: 1
COUGH: 0
MYALGIAS: 0
LOSS OF CONSCIOUSNESS: 0
SHORTNESS OF BREATH: 0

## 2023-11-03 ASSESSMENT — FIBROSIS 4 INDEX: FIB4 SCORE: 1.17

## 2023-11-03 NOTE — PROGRESS NOTES
"Chief Complaint   Patient presents with    Palpitations    Dyslipidemia    Follow-Up     F/V DX: PSVT (paroxysmal supraventricular tachycardia) (HCC)       Subjective     Argelia Jimenez is a 73 y.o. female who presents today follow-up cardiac care.    The patient has a history of SVT, PAF, OAC on rivaroxaban, GERD, thyroid cancer, thyroidectomy, laminectomy, hypertension, dyslipidemia    Since 4/25/2023 appointment the patient has had no cardiac symptoms including chest pain, palpitations, shortness of breath.  On 10/1/2023 while on a cruise patient had spontaneous episodes of palpitations lasting for approximately 5 minutes with no other cardiac symptoms.  Apple watch device recorded a a narrow QRS SVT of 130 bpm.  Palpitation episodes still rare occurring twice a year.  Compliant with medications.  Has upcoming epidural injection for ongoing lumbar DDD and hysterectomy.    The patient has a reported history of palpitations for 30 years, and a reported history of SVT and PAF.  According to the patient and review of the medical records her diagnosis of atrial fibrillation at the time she saw Dr. Connor in consultation in 4/2022 and was based on Apple Watch personal cardiac monitor strips made the previous month in 3/2022.  She was continued on metoprolol and was started on Xarelto.  She did describe at that time episodes of sudden though brief episodes of palpitations with heart rates recorded up to \"150 bpm\".  Since her last appointment she has had a rare skipping like heartbeat but no sustained rapid heartbeats.  She also reports an occasional episode of nocturnal brief shortness of breath.  She denies angina pectoris or syncope.  She has had no bleeding problems or significant bruising since starting Xarelto.    Additionally she had a coronary calcium scan with a score of 74 and 5/5/2022 currently is on rosuvastatin 10 mg daily  She moved to [place] approximately 7 years ago with her  from [place] " "with a history of \"SVT\" and had been on metoprolol and aspirin.  She also has a history of thyroid cancer with partial lobectomies in 1990 1993 with I-131 in 1993 on chronic thyroid supplementation.  She is required lumbar surgery with ongoing back problems limiting her ability to exercise though she does Pilates.    Past Medical History:   Diagnosis Date    Acute nasopharyngitis 10/31/2023    congestion today, f/u with Dr. Patterson    Adverse effect of anesthesia 1990 & 1993    Post-op vomitting, & neck/shoulder pain    Anesthesia 10/31/2023    severe PONV, history of motion sickness    Arrhythmia     PAC's,PVC's    Arthritis 10/31/2023    left wrist, right leg/hip    Bowel habit changes 10/31/2023    constipation, medicated    Bronchitis 10/31/2023    history of    Cancer (HCC) 10/31/2023    thyroid at age 40    Cataract 2017    Removed 2017 & 2018    Chronic bilateral low back pain with bilateral sciatica 10/31/2023    at present    Chronic use of opiate for therapeutic purpose     GERD (gastroesophageal reflux disease)     Gynecological disorder 2022    Pelvic Organ Prolapse-this surgery    Heart burn 10/31/2023    medicated    High cholesterol 2021    Now taking Simvastatin    History of thyroid cancer     Hyperlipidemia     Hypertension 10/31/2023    medicated    Indigestion 10/31/2023    medicated    Insomnia     PAC (premature atrial contraction)     PONV (postoperative nausea and vomiting) 10/31/2023    pt has history of motion sickness    Postsurgical hypothyroidism 10/31/2023    total thyroidectomy, medicated    PSVT (paroxysmal supraventricular tachycardia)     Snoring 2010?    As reported by spouse    Urinary bladder disorder October 2020    Recurrent UTI    Urinary incontinence 10/31/2023    wears pad    Urinary tract infection      Past Surgical History:   Procedure Laterality Date    OTHER Bilateral 10/31/2023    IOLOU    PB LAMINOTOMY,LUMBAR DISK,1 INTRSP N/A 03/03/2021    Procedure: LAMINECTOMY, SPINE, " LUMBAR, WITH DISCECTOMY - L2-S1;  Surgeon: Robert Mcdaniel M.D.;  Location: SURGERY [facility];  Service: Neurosurgery    FORAMINOTOMY N/A 03/03/2021    Procedure: FORAMINOTOMY, SPINE;  Surgeon: Robert Mcdaniel M.D.;  Location: SURGERY [facility];  Service: Neurosurgery    OTHER  12/2020    lipoma left arm    COLONOSCOPY  2016    normal    THYROIDECTOMY Right 1993    THYROIDECTOMY Left 1990    OTHER Left 1971    ruptured ovarian cyst/appendectomy    APPENDECTOMY      GYN SURGERY  1971    Ruptured ovarian cyst    OTHER NEUROLOGICAL SURG  March 2021    Laminectomy    TONSILLECTOMY       Family History   Problem Relation Age of Onset    Cancer Mother 60        breast cancer    Heart Disease Sister         rapid heartbeat    Cancer Brother         colon cancer    Alcohol/Drug Brother         alcoholism    No Known Problems Brother      Social History     Socioeconomic History    Marital status:      Spouse name: Not on file    Number of children: Not on file    Years of education: Not on file    Highest education level: Not on file   Occupational History    Occupation:     Tobacco Use    Smoking status: Never    Smokeless tobacco: Never   Vaping Use    Vaping Use: Never used   Substance and Sexual Activity    Alcohol use: Yes     Alcohol/week: 1.2 - 3.6 oz     Types: 2 - 6 Glasses of wine per week     Comment: 1-2 drinks, 2-3 times weekly    Drug use: No    Sexual activity: Not Currently     Partners: Male     Birth control/protection: Post-Menopausal     Comment: 2 childen  (son and daughter)   Other Topics Concern    Not on file   Social History Narrative    Not on file     Social Determinants of Health     Financial Resource Strain: Not on file   Food Insecurity: Not on file   Transportation Needs: Not on file   Physical Activity: Not on file   Stress: Not on file   Social Connections: Not on file   Intimate Partner Violence: Not on file   Housing Stability: Not on file     No Known Allergies  Outpatient Encounter  Medications as of 11/3/2023   Medication Sig Dispense Refill    diclofenac sodium (VOLTAREN ARTHRITIS PAIN) 1 % Gel Apply  topically 4 times a day as needed.      Polyethylene Glycol 3350 (MIRALAX PO) Take  by mouth. Twice weekly      metoprolol SR (TOPROL XL) 25 MG TABLET SR 24 HR Take 1 Tablet by mouth every day. 100 Tablet 2    simvastatin (ZOCOR) 20 MG Tab Take 1 Tablet by mouth every day. 100 Tablet 0    estradiol (ESTRACE) 0.1 MG/GM vaginal cream Insert 1 g into the vagina every day. 42.5 g 0    gabapentin (NEURONTIN) 300 MG Cap Take 1 Capsule by mouth 3 times a day. 90 Capsule 0    rivaroxaban (XARELTO) 20 MG Tab tablet Take 1 Tablet by mouth with dinner. 100 Tablet 3    levothyroxine (SYNTHROID) 88 MCG Tab TAKE 1 TABLET BY MOUTH EVERY DAY IN THE MORNING ON AN EMPTY STOMACH 100 Tablet 1    omeprazole (PRILOSEC) 40 MG delayed-release capsule Take 1 Capsule by mouth every Monday, Wednesday, and Friday. 100 Capsule 3    methocarbamol (ROBAXIN) 750 MG Tab TAKE 1 TO 2 TABLETS BY MOUTH 3 TIMES A DAY AS NEEDED FOR SPASMS      Fluticasone Propionate (FLONASE NA) Administer  into affected nostril(S) every day.      METAMUCIL FIBER PO Take  by mouth as needed.      D-MANNOSE PO Take 2,100 mg by mouth every day.      GARLIC OIL PO Take 1 tablet by mouth 2 times a day.      HYDROcodone-acetaminophen (NORCO) 5-325 MG Tab per tablet Take 1 tablet by mouth every four hours as needed.      Omega-3 Fatty Acids (FISH OIL PO) Take 1 tablet by mouth.      [DISCONTINUED] azelastine (OPTIVAR) 0.05 % ophthalmic solution ADMINISTER 1 DROP INTO BOTH EYES 2 TIMES A DAY. (Patient not taking: Reported on 10/31/2023) 6 mL 3     No facility-administered encounter medications on file as of 11/3/2023.     Review of Systems   Respiratory:  Negative for cough and shortness of breath.    Cardiovascular:  Positive for palpitations. Negative for chest pain.   Musculoskeletal:  Negative for myalgias.   Neurological:  Negative for dizziness and  "loss of consciousness.              Objective     /72 (BP Location: Left arm, Patient Position: Sitting, BP Cuff Size: Adult)   Pulse 70   Resp 14   Ht 1.676 m (5' 6\")   Wt 67.6 kg (149 lb)   SpO2 97%   BMI 24.05 kg/m²     Physical Exam  Vitals reviewed.   Constitutional:       General: She is not in acute distress.     Appearance: She is well-developed.   Eyes:      Conjunctiva/sclera: Conjunctivae normal.      Pupils: Pupils are equal, round, and reactive to light.   Neck:      Vascular: No JVD.   Cardiovascular:      Rate and Rhythm: Normal rate and regular rhythm.      Pulses:           Radial pulses are 1+ on the right side and 1+ on the left side.      Heart sounds: Normal heart sounds. No murmur heard.     No friction rub. No gallop.   Pulmonary:      Effort: Pulmonary effort is normal. No accessory muscle usage or respiratory distress.      Breath sounds: Normal breath sounds. No wheezing or rales.   Musculoskeletal:      Right lower leg: No edema.      Left lower leg: No edema.   Skin:     General: Skin is warm and dry.      Findings: No rash.      Nails: There is no clubbing.   Neurological:      Mental Status: She is alert and oriented to person, place, and time.   Psychiatric:         Behavior: Behavior normal.          ECHOCARDIOGRAM 12/14/2018  No prior study is available for comparison.   Left ventricular ejection fraction is visually estimated to be 55%.  Normal inferior vena cava size and inspiratory collapse.  Normal transthoracic echocardiogram.        Assessment & Plan     1. PSVT (paroxysmal supraventricular tachycardia) ([facility])        2. PAF (paroxysmal atrial fibrillation) ([facility])        3. Secondary hypercoagulable state (HCC)        4. Anticoagulated        5. Palpitations        6. Dyslipidemia              Medical Decision Making: Today's Assessment/Status/Plan:   PAF  SVT  OAC on rivaroxaban  Coronary calcification  Dyslipidemia  S/P thyroidectomy for thyroid cancer  Lumbar DDD, " prior laminectomy  Chronic back pain    Recommendation Discussion  Clinically stable from a cardiac standpoint  Reviewed Apple Watch rhythm strip confirming episode of SVT at 130 bpm, cannot exclude atrial flutter  SVT, palpitation episodes rare, continue metoprolol, rivaroxaban  If symptoms become more frequent and if possible would use a Zio patch cardiac monitor to confirm arrhythmia mechanism  Concerning her upcoming epidural and hysterectomy procedures she is at a low perioperative risk for cardiac events and is cleared to stop Xarelto for the necessary duration prior to the procedures and to be restarted at the direction of the physicians when the post procedure, surgical risk of bleeding is acceptable  RTC 9 months.

## 2023-11-05 ENCOUNTER — PATIENT MESSAGE (OUTPATIENT)
Dept: CARDIOLOGY | Facility: MEDICAL CENTER | Age: 73
End: 2023-11-05
Payer: MEDICARE

## 2023-11-05 SDOH — HEALTH STABILITY: PHYSICAL HEALTH: ON AVERAGE, HOW MANY DAYS PER WEEK DO YOU ENGAGE IN MODERATE TO STRENUOUS EXERCISE (LIKE A BRISK WALK)?: 0 DAYS

## 2023-11-05 SDOH — ECONOMIC STABILITY: HOUSING INSECURITY
IN THE LAST 12 MONTHS, WAS THERE A TIME WHEN YOU DID NOT HAVE A STEADY PLACE TO SLEEP OR SLEPT IN A SHELTER (INCLUDING NOW)?: NO

## 2023-11-05 SDOH — ECONOMIC STABILITY: INCOME INSECURITY: IN THE LAST 12 MONTHS, WAS THERE A TIME WHEN YOU WERE NOT ABLE TO PAY THE MORTGAGE OR RENT ON TIME?: NO

## 2023-11-05 SDOH — ECONOMIC STABILITY: TRANSPORTATION INSECURITY
IN THE PAST 12 MONTHS, HAS LACK OF TRANSPORTATION KEPT YOU FROM MEETINGS, WORK, OR FROM GETTING THINGS NEEDED FOR DAILY LIVING?: NO

## 2023-11-05 SDOH — ECONOMIC STABILITY: FOOD INSECURITY: WITHIN THE PAST 12 MONTHS, YOU WORRIED THAT YOUR FOOD WOULD RUN OUT BEFORE YOU GOT MONEY TO BUY MORE.: NEVER TRUE

## 2023-11-05 SDOH — ECONOMIC STABILITY: TRANSPORTATION INSECURITY
IN THE PAST 12 MONTHS, HAS THE LACK OF TRANSPORTATION KEPT YOU FROM MEDICAL APPOINTMENTS OR FROM GETTING MEDICATIONS?: NO

## 2023-11-05 SDOH — ECONOMIC STABILITY: HOUSING INSECURITY: IN THE LAST 12 MONTHS, HOW MANY PLACES HAVE YOU LIVED?: 1

## 2023-11-05 SDOH — HEALTH STABILITY: PHYSICAL HEALTH: ON AVERAGE, HOW MANY MINUTES DO YOU ENGAGE IN EXERCISE AT THIS LEVEL?: 0 MIN

## 2023-11-05 SDOH — ECONOMIC STABILITY: TRANSPORTATION INSECURITY
IN THE PAST 12 MONTHS, HAS LACK OF RELIABLE TRANSPORTATION KEPT YOU FROM MEDICAL APPOINTMENTS, MEETINGS, WORK OR FROM GETTING THINGS NEEDED FOR DAILY LIVING?: NO

## 2023-11-05 SDOH — ECONOMIC STABILITY: FOOD INSECURITY: WITHIN THE PAST 12 MONTHS, THE FOOD YOU BOUGHT JUST DIDN'T LAST AND YOU DIDN'T HAVE MONEY TO GET MORE.: NEVER TRUE

## 2023-11-05 SDOH — HEALTH STABILITY: MENTAL HEALTH
STRESS IS WHEN SOMEONE FEELS TENSE, NERVOUS, ANXIOUS, OR CAN'T SLEEP AT NIGHT BECAUSE THEIR MIND IS TROUBLED. HOW STRESSED ARE YOU?: ONLY A LITTLE

## 2023-11-05 SDOH — ECONOMIC STABILITY: INCOME INSECURITY: HOW HARD IS IT FOR YOU TO PAY FOR THE VERY BASICS LIKE FOOD, HOUSING, MEDICAL CARE, AND HEATING?: NOT HARD AT ALL

## 2023-11-05 ASSESSMENT — SOCIAL DETERMINANTS OF HEALTH (SDOH)
HOW OFTEN DO YOU ATTEND CHURCH OR RELIGIOUS SERVICES?: MORE THAN 4 TIMES PER YEAR
IN A TYPICAL WEEK, HOW MANY TIMES DO YOU TALK ON THE PHONE WITH FAMILY, FRIENDS, OR NEIGHBORS?: ONCE A WEEK
HOW OFTEN DO YOU GET TOGETHER WITH FRIENDS OR RELATIVES?: ONCE A WEEK
HOW MANY DRINKS CONTAINING ALCOHOL DO YOU HAVE ON A TYPICAL DAY WHEN YOU ARE DRINKING: 1 OR 2
HOW OFTEN DO YOU ATTEND CHURCH OR RELIGIOUS SERVICES?: MORE THAN 4 TIMES PER YEAR
HOW OFTEN DO YOU ATTENT MEETINGS OF THE CLUB OR ORGANIZATION YOU BELONG TO?: MORE THAN 4 TIMES PER YEAR
HOW HARD IS IT FOR YOU TO PAY FOR THE VERY BASICS LIKE FOOD, HOUSING, MEDICAL CARE, AND HEATING?: NOT HARD AT ALL
HOW OFTEN DO YOU ATTENT MEETINGS OF THE CLUB OR ORGANIZATION YOU BELONG TO?: MORE THAN 4 TIMES PER YEAR
HOW OFTEN DO YOU HAVE A DRINK CONTAINING ALCOHOL: 2-3 TIMES A WEEK
HOW OFTEN DO YOU HAVE SIX OR MORE DRINKS ON ONE OCCASION: NEVER
HOW OFTEN DO YOU GET TOGETHER WITH FRIENDS OR RELATIVES?: ONCE A WEEK
WITHIN THE PAST 12 MONTHS, YOU WORRIED THAT YOUR FOOD WOULD RUN OUT BEFORE YOU GOT THE MONEY TO BUY MORE: NEVER TRUE
IN A TYPICAL WEEK, HOW MANY TIMES DO YOU TALK ON THE PHONE WITH FAMILY, FRIENDS, OR NEIGHBORS?: ONCE A WEEK
DO YOU BELONG TO ANY CLUBS OR ORGANIZATIONS SUCH AS CHURCH GROUPS UNIONS, FRATERNAL OR ATHLETIC GROUPS, OR SCHOOL GROUPS?: YES
DO YOU BELONG TO ANY CLUBS OR ORGANIZATIONS SUCH AS CHURCH GROUPS UNIONS, FRATERNAL OR ATHLETIC GROUPS, OR SCHOOL GROUPS?: YES

## 2023-11-05 ASSESSMENT — LIFESTYLE VARIABLES
AUDIT-C TOTAL SCORE: 3
HOW MANY STANDARD DRINKS CONTAINING ALCOHOL DO YOU HAVE ON A TYPICAL DAY: 1 OR 2
SKIP TO QUESTIONS 9-10: 1
HOW OFTEN DO YOU HAVE SIX OR MORE DRINKS ON ONE OCCASION: NEVER
HOW OFTEN DO YOU HAVE A DRINK CONTAINING ALCOHOL: 2-3 TIMES A WEEK

## 2023-11-05 NOTE — PROCEDURES
Procedure note: Complex urodynamic testing    Procedure performed:    -     71832 Complex Uroflowmetry  20641 Complex CMG w/ voiding pressure study AND urethral pressure  30043 Complex CMG w/ voiding pressure study  87111 EMG studies anal or urethral sphincter   81180 Intraabdominal catheter       Indication: Argelia Jimenez is a 73 y.o. year old with mixed urinary incontinence.   Bladder symptoms:                Voids per day: 6          Voids per night: 1 rarely                 Urinary incontinence episodes per day: 1-2               Urge leakage:  warm water trigger, but no incontinence              Stress leakage: With Cough and sneeze              Continuous / insensible urine loss: No               Nocturnal enuresis: No               Leakage volume: Drops              Number of pads/day: 1 pad              Bladder emptying: Complete              Voiding symptoms: Post-Void Dribble              UTI in last 12 months: last 3 years ago after starting d-mannose and estrogen              Other urologic history: none    She presents for complex urodynamic testing today to fully elucidate her bladder function and symptom pathophysiology, in order to guide further treatment, and to evaluate if an anti-incontinence procedure is indicated at her upcoming prolapse repair.     Verbal consent was obtained after review of risk and benefit.     Chaperone: *** KISHAN Simpson MA    Procedure: The patient was taken to the urodynamic suite and placed in the urodynamic chair. She underwent sterile prep with ***betadine prior to catheterization. There was a ***negative urinalysis. Air-charged catheters were placed in the urethra/bladder and vagina. Urodynamics were performed using routine techniques. Prolapse was reduced with a cotton scopette for stress testing. There were no complications.     Antibiotic given: ***    Urodynamic findings:     Preliminary Uroflometry     Flow pattern:  ***  Maximum flow: *** mL/sec  Average flow: *** mL/sec  Voided volume: *** mL  Post-void residual: *** mL  Flow time: *** sec    Filling cystometrogram    First sensation: *** mL  First desire: *** mL  Strong Desire: *** mL  Urodynamic capacity: *** mL / Infusion stopped at: *** mL  Stress leakage: ***  Uninhibited detrusor contractions present: ***  Leakage with DO: ***  Leak point pressures  At ***mL volume: *** cm H2O  At ***mL volume: *** cm H2O  Compliance: ***    Urethral pressure profile    Maximum urethral closing pressure (MUCP): *** cm H2O  Morphology: ***    Pressure voiding study    The patient's voiding mechanism was accomplished by detrusor contraction ***and valsalva  Max flow: *** mL/sec  Average flow: *** mL/sec  Post-void residual: *** mL  Pdet at peak flow: *** cm H2O  Flow time: *** sec  Pelvic floor EMG silenced during voiding: ***    Pelvic floor EMG: ***Normal       UDS procedure performed by SOSA Cohen RNFA      Assessment:     She has completed urodynamic testing, which was uncomplicated.     Filling phase: ***   Voiding phase: ***    Plan:   She was counseled on urodynamic findings  ***See office encounter for full procedural counseling  Counseled on normal post-UDS symptoms including burning and possible hematuria. If this persists after 2 days she should call or send [employer] message.         Antoni Patterson MD, FACOG    Female Pelvic Medicine and Reconstructive Surgery  Department of Obstetrics and Gynecology  [facility]

## 2023-11-06 ENCOUNTER — TELEPHONE (OUTPATIENT)
Dept: OBGYN | Facility: CLINIC | Age: 73
End: 2023-11-06

## 2023-11-06 ENCOUNTER — OFFICE VISIT (OUTPATIENT)
Dept: MEDICAL GROUP | Facility: PHYSICIAN GROUP | Age: 73
End: 2023-11-06
Payer: MEDICARE

## 2023-11-06 VITALS
BODY MASS INDEX: 23.98 KG/M2 | SYSTOLIC BLOOD PRESSURE: 110 MMHG | WEIGHT: 149.2 LBS | HEIGHT: 66 IN | HEART RATE: 75 BPM | DIASTOLIC BLOOD PRESSURE: 80 MMHG | OXYGEN SATURATION: 96 % | TEMPERATURE: 97.2 F

## 2023-11-06 DIAGNOSIS — I48.0 PAF (PAROXYSMAL ATRIAL FIBRILLATION) (HCC): ICD-10-CM

## 2023-11-06 DIAGNOSIS — J06.9 ACUTE URI: ICD-10-CM

## 2023-11-06 DIAGNOSIS — E78.5 DYSLIPIDEMIA: ICD-10-CM

## 2023-11-06 DIAGNOSIS — E89.0 POSTSURGICAL HYPOTHYROIDISM: ICD-10-CM

## 2023-11-06 DIAGNOSIS — Z79.891 CHRONIC USE OF OPIATE FOR THERAPEUTIC PURPOSE: ICD-10-CM

## 2023-11-06 DIAGNOSIS — I47.10 PAROXYSMAL SUPRAVENTRICULAR TACHYCARDIA (HCC): ICD-10-CM

## 2023-11-06 DIAGNOSIS — U07.1 COVID-19: ICD-10-CM

## 2023-11-06 LAB
FLUAV RNA SPEC QL NAA+PROBE: NEGATIVE
FLUBV RNA SPEC QL NAA+PROBE: NEGATIVE
RSV RNA SPEC QL NAA+PROBE: NEGATIVE
SARS-COV-2 RNA RESP QL NAA+PROBE: POSITIVE

## 2023-11-06 PROCEDURE — 99214 OFFICE O/P EST MOD 30 MIN: CPT | Performed by: STUDENT IN AN ORGANIZED HEALTH CARE EDUCATION/TRAINING PROGRAM

## 2023-11-06 PROCEDURE — 0241U POCT CEPHEID COV-2, FLU A/B, RSV - PCR: CPT | Performed by: STUDENT IN AN ORGANIZED HEALTH CARE EDUCATION/TRAINING PROGRAM

## 2023-11-06 PROCEDURE — 3079F DIAST BP 80-89 MM HG: CPT | Performed by: STUDENT IN AN ORGANIZED HEALTH CARE EDUCATION/TRAINING PROGRAM

## 2023-11-06 PROCEDURE — 3074F SYST BP LT 130 MM HG: CPT | Performed by: STUDENT IN AN ORGANIZED HEALTH CARE EDUCATION/TRAINING PROGRAM

## 2023-11-06 ASSESSMENT — ENCOUNTER SYMPTOMS
DIZZINESS: 0
SORE THROAT: 0
HEADACHES: 0
BLURRED VISION: 0
COUGH: 0
FEVER: 0
CHILLS: 0
SHORTNESS OF BREATH: 0

## 2023-11-06 ASSESSMENT — FIBROSIS 4 INDEX: FIB4 SCORE: 1.17

## 2023-11-06 NOTE — PROGRESS NOTES
Subjective:     CC:    Chief Complaint   Patient presents with    Establish Care    Coronavirus Screening     Requesting before surgery         HISTORY OF THE PRESENT ILLNESS: Patient is a 73 y.o. female. This pleasant patient is here today to establish care and discuss the following conditions. Prior PCP was Dr. Santizo.    Problem   Paf (Paroxysmal Atrial Fibrillation) (Hcc)    Chronic in nature.  Patient is asymptomatic.  She is anticoagulated on Xarelto 20 mg daily.  She follows with cardiology regularly.     Dyslipidemia    Lab Results   Component Value Date/Time    CHOLSTRLTOT 234 (H) 09/07/2022 0911    TRIGLYCERIDE 64 09/07/2022 0911    HDL 72 09/07/2022 0911     (H) 09/07/2022 0911     Current medications: simvastatin 20 mg daily     PSVT (paroxysmal supraventricular tachycardia) (HCC)    Chronic in nature.  Current medications: Metoprolol 25 mg daily.  Follows with cardiology regularly.     Postsurgical Hypothyroidism    This is a chronic condition.  S/p thyroidectomy for thyroid CA.  Current medications: levothyroxine 88 mcg daily  Last TSH: 0.490, 9/7/2022  Symptoms: Denies fatigue, cold intolerance, weight change, constipation, dry skin, voice change.     Chronic Use of Opiate for Therapeutic Purpose    Follows with pain management for chronic back pain and takes Norco as needed.      Bmi 24.0-24.9, Adult (Resolved)         Past Medical History: Diagnoses of Preoperative clearance, Acute URI, Chronic use of opiate for therapeutic purpose, Postsurgical hypothyroidism, Dyslipidemia, PAF (paroxysmal atrial fibrillation) (HCC), and PSVT (paroxysmal supraventricular tachycardia) (HCC) were pertinent to this visit.    Current Outpatient Medications   Medication Sig    diclofenac sodium (VOLTAREN ARTHRITIS PAIN) 1 % Gel Apply  topically 4 times a day as needed.    Polyethylene Glycol 3350 (MIRALAX PO) Take  by mouth. Twice weekly    metoprolol SR (TOPROL XL) 25 MG TABLET SR 24 HR Take 1 Tablet by mouth  every day.    simvastatin (ZOCOR) 20 MG Tab Take 1 Tablet by mouth every day.    estradiol (ESTRACE) 0.1 MG/GM vaginal cream Insert 1 g into the vagina every day.    gabapentin (NEURONTIN) 300 MG Cap Take 1 Capsule by mouth 3 times a day.    rivaroxaban (XARELTO) 20 MG Tab tablet Take 1 Tablet by mouth with dinner.    levothyroxine (SYNTHROID) 88 MCG Tab TAKE 1 TABLET BY MOUTH EVERY DAY IN THE MORNING ON AN EMPTY STOMACH    omeprazole (PRILOSEC) 40 MG delayed-release capsule Take 1 Capsule by mouth every Monday, Wednesday, and Friday.    methocarbamol (ROBAXIN) 750 MG Tab TAKE 1 TO 2 TABLETS BY MOUTH 3 TIMES A DAY AS NEEDED FOR SPASMS    Fluticasone Propionate (FLONASE NA) Administer  into affected nostril(S) every day.    METAMUCIL FIBER PO Take  by mouth as needed.    D-MANNOSE PO Take 2,100 mg by mouth every day.    GARLIC OIL PO Take 1 tablet by mouth 2 times a day.    HYDROcodone-acetaminophen (NORCO) 5-325 MG Tab per tablet Take 1 tablet by mouth every four hours as needed.    Omega-3 Fatty Acids (FISH OIL PO) Take 1 tablet by mouth.        Social History     Socioeconomic History    Marital status:     Highest education level: Associate degree: occupational, technical, or vocational program   Occupational History    Occupation:     Tobacco Use    Smoking status: Never    Smokeless tobacco: Never   Vaping Use    Vaping Use: Never used   Substance and Sexual Activity    Alcohol use: Yes     Alcohol/week: 1.2 - 3.6 oz     Types: 2 - 6 Glasses of wine per week     Comment: 1-2 drinks, 2-3 times weekly    Drug use: No    Sexual activity: Not Currently     Partners: Male     Birth control/protection: Post-Menopausal     Comment: 2 childen  (son and daughter)     Social Determinants of Health     Financial Resource Strain: Low Risk  (11/5/2023)    Overall Financial Resource Strain (CARDIA)     Difficulty of Paying Living Expenses: Not hard at all   Food Insecurity: No Food Insecurity (11/5/2023)    Hunger  Vital Sign     Worried About Running Out of Food in the Last Year: Never true     Ran Out of Food in the Last Year: Never true   Transportation Needs: No Transportation Needs (11/5/2023)    PRAPARE - Transportation     Lack of Transportation (Medical): No     Lack of Transportation (Non-Medical): No   Physical Activity: Inactive (11/5/2023)    Exercise Vital Sign     Days of Exercise per Week: 0 days     Minutes of Exercise per Session: 0 min   Stress: No Stress Concern Present (11/5/2023)    [employer] [facility] [employer] [facility] - Occupational Stress Questionnaire     Feeling of Stress : Only a little   Social Connections: Moderately Integrated (11/5/2023)    Social Connection and Isolation Panel [NHANES]     Frequency of Communication with Friends and Family: Once a week     Frequency of Social Gatherings with Friends and Family: Once a week     Attends [employer] Services: More than 4 times per year     Active Member of Clubs or Organizations: Yes     Attends Club or Organization Meetings: More than 4 times per year     Marital Status:    Housing Stability: Low Risk  (11/5/2023)    Housing Stability Vital Sign     Unable to Pay for Housing in the Last Year: No     Number of Places Lived in the Last Year: 1     Unstable Housing in the Last Year: No       Family History   Problem Relation Age of Onset    Cancer Mother 60        breast cancer    Heart Disease Sister         rapid heartbeat    Cancer Brother         colon cancer    Alcohol/Drug Brother         alcoholism    No Known Problems Brother          Health Maintenance: Completed      ROS:   Review of Systems   Constitutional:  Negative for chills, fever and malaise/fatigue.   HENT:  Positive for congestion. Negative for ear pain and sore throat.    Eyes:  Negative for blurred vision.   Respiratory:  Negative for cough and shortness of breath.    Cardiovascular:  Negative for chest pain.   Neurological:  Negative for dizziness and headaches.  "      Objective:     Exam: /80 (BP Location: Left arm, Patient Position: Sitting, BP Cuff Size: Adult)   Pulse 75   Temp 36.2 °C (97.2 °F) (Temporal)   Ht 1.676 m (5' 6\")   Wt 67.7 kg (149 lb 3.2 oz)   SpO2 96%  Body mass index is 24.08 kg/m².    Physical Exam  Constitutional:       General: She is not in acute distress.  HENT:      Right Ear: External ear normal.      Left Ear: External ear normal.      Mouth/Throat:      Mouth: Mucous membranes are moist.   Eyes:      Extraocular Movements: Extraocular movements intact.      Conjunctiva/sclera: Conjunctivae normal.   Cardiovascular:      Rate and Rhythm: Normal rate and regular rhythm.      Heart sounds: No murmur heard.     No friction rub. No gallop.   Pulmonary:      Effort: Pulmonary effort is normal.      Breath sounds: No wheezing, rhonchi or rales.   Musculoskeletal:      Cervical back: Normal range of motion and neck supple.      Right lower leg: No edema.      Left lower leg: No edema.   Lymphadenopathy:      Cervical: No cervical adenopathy.   Skin:     General: Skin is warm and dry.   Neurological:      Mental Status: She is alert.   Psychiatric:         Mood and Affect: Mood normal.         Labs: Recent lab results were reviewed.    Assessment & Plan:     73 y.o. female with the following -    1. Acute URI  2. COVID-19  This is an acute problem.  Patient has had symptoms for about 7 weeks.  Symptoms are consistent with mild upper respiratory tract infection.  She would like to be tested for COVID 19 primarily as she does have a couple procedures upcoming.  Her COVID test today is positive.  She is outside of the window for treatment with Paxlovid and her symptoms have been improving.  She was instructed on current CDC isolation/mask wearing guidelines via [employer].  - POCT CEPHEID COV-2, FLU A/B, RSV - PCR    3. Postsurgical hypothyroidism  Chronic, controlled. Patient is taking levothyroxine and tolerating it well. Last TSH 09/2022 WNL. " Continue current dosing.   - TSH WITH REFLEX TO FT4; Future    4. Dyslipidemia  Chronic, uncontrolled on last lipid panel.  This was before her pravastatin was started.  Continue pravastatin at current dosing and recheck lipid panel.  - Lipid Profile; Future  - CBC WITHOUT DIFFERENTIAL; Future    5. PAF (paroxysmal atrial fibrillation) (HCC)  Chronic, stable.  Continue medications and follow-up per cardiology.    6. PSVT (paroxysmal supraventricular tachycardia) (HCC)  Chronic, stable.  Continue medications and follow-up per cardiology.    7. Chronic use of opiate for therapeutic purpose  Chronic, stable.  Managed by pain management specialist.  - Comp Metabolic Panel; Future        Return in about 6 months (around 5/6/2024) for Med follow up.    Please note that this dictation was created using voice recognition software. I have made every reasonable attempt to correct obvious errors, but I expect that there are errors of grammar and possibly content that I did not discover before finalizing the note.

## 2023-11-06 NOTE — TELEPHONE ENCOUNTER
LM for patient to return phone call to clinic about message below.  Brionna Pereira CMA ([facility])           PT called in stating she tested positive  for Covid today. She states she was sick for about 7 days and finally tested today. Her symptoms have subsided drastically and states she now only has a bit of sinus drainage and a mild cough. PT is scheduled for a UDS + Pre Op with Andreia Cervantes and Dr. Patterson tomorrow 11/07/2023. She is also scheduled for surgery with Dr. Patterson 11/15/2023.  Encounter routed to Dr. Patterson.

## 2023-11-06 NOTE — PATIENT COMMUNICATION
Phone Number Called: [phone]    Call outcome: Spoke to patient regarding message below.    Message: Called to discuss Leonidasluzxander boo. Pt denies symptoms. She states that she discussed with SW who wanted her to send over any afib recordings she got.     SW, for you to review and advise. Thank you!

## 2023-11-07 ENCOUNTER — TELEMEDICINE (OUTPATIENT)
Dept: GYNECOLOGY | Facility: CLINIC | Age: 73
End: 2023-11-07
Payer: MEDICARE

## 2023-11-07 ENCOUNTER — APPOINTMENT (OUTPATIENT)
Dept: GYNECOLOGY | Facility: CLINIC | Age: 73
End: 2023-11-07
Payer: MEDICARE

## 2023-11-07 DIAGNOSIS — N81.2 INCOMPLETE UTEROVAGINAL PROLAPSE: ICD-10-CM

## 2023-11-07 DIAGNOSIS — U07.1 COVID-19: ICD-10-CM

## 2023-11-07 PROCEDURE — 99213 OFFICE O/P EST LOW 20 MIN: CPT | Mod: 95 | Performed by: STUDENT IN AN ORGANIZED HEALTH CARE EDUCATION/TRAINING PROGRAM

## 2023-11-07 NOTE — PROGRESS NOTES
Urogynecology and Pelvic Reconstructive Surgery Consultation Visit    Argelia Jimenez [id number]    Referred by: Jonas Metzger DO    Reason for Visit: Follow up    This evaluation was conducted via Zoom using secure and encrypted videoconferencing technology. The patient was in a private location at her home in the [facility].  The patient's identity was confirmed and verbal consent was obtained for this virtual visit.      Subjective     History of Presenting Illness:     Argelia Jimenez is a 73 y.o. year old P2 who presents for follow up and pre-op counseling    She was supposed to undergo UDS testing today but has tested positive for COVID and visit was changed to virtual     Her symptoms started 8 days ago, but was tested yesterday for COVID. Symptoms include congestion and cough.     Prolapse has worsened since her last visit, and makes it harder for her to empty her bladder.       Initial HPI: She was referred by Dr. Metzger for the evaluation and management of pelvic organ prolapse. Patient has a chronic history of constipation and recurrent UTIs. For the past 3 years she has been using topical estrogen with good relief of UTIs. However while applying the estrogen 7-8 months ago, she noticed a vaginal bulge. It has been progressively worsening. She was seen by Dr. Metzger in March for this issue, and states the bulge has increased in size since then. It is not painful, rather just uncomfortable, and she notices it while walking. Ms. Jimenez additionally has some mild urinary incontinence for 10+ years which does not bother her at this time. She has not noticed any change in her bowel or bladder symptoms with the prolapse.      She denies history of abnormal Pap smears.  She has not had postmenopausal bleeding since her D&C is in her mid 50s.     Prior Pelvic surgery:   Ruptured ovarian cyst (laparotomy)  Appendectmoy  2x D&C for menorrhagia from fibroids and polyps (early  menopause)                Prior treatment:   Miralax  D-mannose and estrogen for UTI                Fluid intake:   Half a gallon     Pelvic floor symptom review:                 Bladder:              Voids per day: 6          Voids per night: 1 rarely                 Urinary incontinence episodes per day: 1-2               Urge leakage:  warm water trigger, but no incontinence              Stress leakage: With Cough and sneeze              Continuous / insensible urine loss: No               Nocturnal enuresis: No               Leakage volume: Drops              Number of pads/day: 1 pad              Bladder emptying: Complete              Voiding symptoms: Post-Void Dribble              UTI in last 12 months: last 3 years ago after starting d-mannose and estrogen              Other urologic history: none                 Prolapse:                Prolapse symptoms: Bulge, uncomfortable              Degree of prolapse: To Introitus              Duration of prolapse symptoms: 7-8 mo, intermittent, return                  Bowel:               Constipation: Yes, uses Miralax 2x weekly              Bowel movements per day: multiple small pellets a day 3-4x               Straining to empty bowels: Yes              Splinting to evacuate: Yes, not helping              Painful evacuation: No               Difficulty emptying rectum: Yes              Incontinence to stool: No              Blood in stool: Yes              Hemorrhoids: Yes              Bowel conditions: None, daughter with UC, brother diagnosed colon cancer in 40's              Most recent colonoscopy: <1 year ago, 1 polyp benign                  Sexual function:               Sexually active: No               Gender of partners: Male              Pain with intercourse: No                            Pelvic Pain: no        Past medical and surgical history     Past obstetric history              Number of vaginal deliveries: 2              Number of   deliveries: 0              History of vacuum/forceps: No               History of obstetric anal sphincter injury: No      Past gynecological history:               Last menstrual period: No LMP recorded. Patient is postmenopausal.              History of abnormal uterine bleeding: Yes              History of fibroids: Yes              History of endometrial polyps:  Yes              History of endometriosis: No               History of cervical dysplasia: No               Last pap: 2023              Current contraception: None      Past medical history:  Past Medical History:   Diagnosis Date    Chronic bilateral low back pain with bilateral sciatica 10/31/2023    at present    PONV (postoperative nausea and vomiting) 10/31/2023    pt has history of motion sickness    Anesthesia 10/31/2023    severe PONV, history of motion sickness    Arthritis 10/31/2023    left wrist, right leg/hip    Cancer (HCC) 10/31/2023    thyroid at age 40    Hypertension 10/31/2023    medicated    Heart burn 10/31/2023    medicated    Indigestion 10/31/2023    medicated    Bowel habit changes 10/31/2023    constipation, medicated    Postsurgical hypothyroidism 10/31/2023    total thyroidectomy, medicated    Acute nasopharyngitis 10/31/2023    congestion today, f/u with Dr. Patterson    Urinary incontinence 10/31/2023    wears pad    Bronchitis 10/31/2023    history of    Adverse effect of anesthesia 1990 & 1993    Post-op vomitting, & neck/shoulder pain    Arrhythmia     PAC's,PVC's    Cataract 2017    Removed 2017 & 2018    Chronic use of opiate for therapeutic purpose     GERD (gastroesophageal reflux disease)     Gynecological disorder 2022    Pelvic Organ Prolapse-this surgery    High cholesterol 2021    Now taking Simvastatin    History of thyroid cancer     Hyperlipidemia     Insomnia     PAC (premature atrial contraction)     PSVT (paroxysmal supraventricular tachycardia)     Snoring 2010?    As reported by spouse    Urinary bladder  disorder October 2020    Recurrent UTI    Urinary tract infection      Past surgical history:  Past Surgical History:   Procedure Laterality Date    OTHER Bilateral 10/31/2023    IOLOU    PB LAMINOTOMY,LUMBAR DISK,1 INTRSP N/A 03/03/2021    Procedure: LAMINECTOMY, SPINE, LUMBAR, WITH DISCECTOMY - L2-S1;  Surgeon: Robert Mcdaniel M.D.;  Location: SURGERY [facility];  Service: Neurosurgery    FORAMINOTOMY N/A 03/03/2021    Procedure: FORAMINOTOMY, SPINE;  Surgeon: Robert Mcdaniel M.D.;  Location: SURGERY [facility];  Service: Neurosurgery    OTHER  12/2020    lipoma left arm    COLONOSCOPY  2016    normal    THYROIDECTOMY Right 1993    THYROIDECTOMY Left 1990    OTHER Left 1971    ruptured ovarian cyst/appendectomy    APPENDECTOMY      GYN SURGERY  1971    Ruptured ovarian cyst    OTHER NEUROLOGICAL SURG  March 2021    Laminectomy    TONSILLECTOMY       Medications:has a current medication list which includes the following prescription(s): diclofenac sodium, polyethylene glycol 3350, metoprolol sr, simvastatin, estradiol, gabapentin, rivaroxaban, levothyroxine, omeprazole, methocarbamol, fluticasone propionate, fiber, d-mannose, garlic, hydrocodone-acetaminophen, and omega-3 fatty acids.  Allergies:Patient has no known allergies.  Family history:  Family History   Problem Relation Age of Onset    Cancer Mother 60        breast cancer    Heart Disease Sister         rapid heartbeat    Cancer Brother         colon cancer    Alcohol/Drug Brother         alcoholism    No Known Problems Brother      Social history: reports that she has never smoked. She has never used smokeless tobacco. She reports current alcohol use of about 1.2 - 3.6 oz of alcohol per week. She reports that she does not use drugs.    Review of systems: A full review of systems was performed, and negative with the exception of want is noted above in the HPI.        Objective        There were no vitals taken for this visit.    Physical Exam  Vitals reviewed.  Exam conducted with a chaperone present (MA - see notes.).   Constitutional:       Appearance: Normal appearance.   HENT:      Head: Normocephalic.      Mouth/Throat:      Mouth: Mucous membranes are moist.   Cardiovascular:      Rate and Rhythm: Normal rate.   Pulmonary:      Effort: Pulmonary effort is normal.   Abdominal:      Palpations: Abdomen is soft. There is no mass.      Tenderness: There is no abdominal tenderness.   Skin:     General: Skin is warm and dry.   Neurological:      Mental Status: She is alert.   Psychiatric:         Mood and Affect: Mood normal.         Genitourinary:    External female genitalia: WNL   Vulva: WNL   Bulbocavernosus reflex: Intact   Anal wink reflex: Intact   Perineal sensation: WNL   Urethra: Hypermobile   Vagina: Atrophic   Atrophy: Mild   Cough stress test: Negative    Pelvic floor:    POP-Q: Aa +0.5 / Ba +0.5 / TVL 9 / C -3 / D -4 / Ap -1 / Bp -1 / GH 4 / PB 2  Aa / Ba 0 with apex supported    Prolapse stage: 2   Paravaginal defect: bilateral mild   Cervical elongation: No    Urethral tenderness: No    Bladder/ suprapubic tenderness: No    Levator tenderness: None   Levator muscle tone: Hypotonic   Pelvic floor contraction strength (modified Oxford scale): 2=Weak   Pelvic floor contraction duration: Brief    Bimanual exam: Small, Mobile Uterus     Procedure Performed: No    Diagnostic test and records review:    Urine dipstick: + nitrites - asymptomatic     Post-void residual: 12 mL, performed by Bladder Scanner    Labs: n/a    Radiology: n/a    Documentation reviewed: Prior EMR Records         Assessment & Plan     Argelia Jimenez is a 73 y.o. year old P2 with symptomatic stage II uterovaginal prolapse.     1. Incomplete uterovaginal prolapse  2. Cystocele, lateral  3. Rectocele  While she strongly desires continuation with surgery (robotic-assisted laparoscopic supracervical hysterectomy, bilateral salpingo-oophorectomy, sacrocolpopexy, possible posterior repair  perineorrhaphy, retropubic midurethral  and all indicated procedures).  Safest course of action is to cancel due to her current COVID status as this putting is too much stress on her lungs and we do not have a chance of performing her bladder work-up as would be required for best management.    -Reschedule surgery and preoperative urodynamic testing.  Message sent to scheduling team        4. Urinary incontinence, mixed  She has a stress predominant incontinence syndrome, moderately bothersome at this time.  I counseled that if we move forward with treatment of prolapse she would undergo bladder testing to fully evaluate her bladder function and whether she is a candidate for an incontinence procedure concomitantly.  I generally recommend treating stress incontinence if it is bothersome at the time of her prolapse repair, as it can often worsen with prolapse reduction.  This will be tested preoperatively.  - POCT Urinalysis    5. Atrophic vaginitis  She has vaginal atrophy on examination. Reviewed risks, benefits, and indications for vaginal estrogen therapy.  Continue with vaginal estrogen therapy twice weekly.                Antoni Patterson MD, FACOG  Urogynecology and Pelvic Reconstructive Surgery  Department of Obstetrics and Gynecology  [facility]        This medical record contains text that has been entered with the assistance of computer voice recognition and dictation software.  Therefore, it may contain unintended errors in text, spelling, punctuation, or grammar

## 2023-11-10 ENCOUNTER — APPOINTMENT (OUTPATIENT)
Dept: ADMISSIONS | Facility: MEDICAL CENTER | Age: 73
End: 2023-11-10
Attending: STUDENT IN AN ORGANIZED HEALTH CARE EDUCATION/TRAINING PROGRAM
Payer: MEDICARE

## 2023-11-10 DIAGNOSIS — Z01.810 PRE-OPERATIVE CARDIOVASCULAR EXAMINATION: ICD-10-CM

## 2023-11-10 RX ORDER — ACETAMINOPHEN 500 MG
1000 TABLET ORAL
Status: CANCELLED | OUTPATIENT
Start: 2023-11-10 | End: 2023-11-11

## 2023-11-10 RX ORDER — PHENAZOPYRIDINE HYDROCHLORIDE 200 MG/1
200 TABLET, FILM COATED ORAL
Status: CANCELLED | OUTPATIENT
Start: 2023-11-10 | End: 2023-11-11

## 2023-11-10 RX ORDER — HEPARIN SODIUM 5000 [USP'U]/ML
5000 INJECTION, SOLUTION INTRAVENOUS; SUBCUTANEOUS ONCE
Status: CANCELLED | OUTPATIENT
Start: 2023-11-10 | End: 2023-11-10

## 2023-11-10 NOTE — OR NURSING
Spoke to patient.  She states that she was told by Dr. Patterson that the surgery would be cancelled for now.  Call made to Dr. Patterson's office.  Message left for his surgery scheduler to discuss/confirm as surgery is still showing scheduled.

## 2023-11-10 NOTE — PROGRESS NOTES
Reviewed the rhythm strip from 10/1/2023 confirming the supraventricular tachycardia.  Perfect recording    Continue to monitor for how often these episodes are recurring and if they become much more frequent document them and let me know otherwise stay on the metoprolol

## 2023-11-20 NOTE — HPI: SKIN LESION (LIPOMA)
How Severe Is Your Lipoma?: moderate
Is This A New Presentation, Or A Follow-Up?: Skin Lesion
Additional History: Denies numbness, tingling, decrease in strength, to left arm.  She has FROM.  \\n\\nPt states [place] doctor would like to get an MRI before surgically removing lipoma.
20-Nov-2023 14:55

## 2023-12-04 RX ORDER — LEVOTHYROXINE SODIUM 88 UG/1
88 TABLET ORAL
Qty: 100 TABLET | Refills: 0 | Status: SHIPPED | OUTPATIENT
Start: 2023-12-04 | End: 2024-03-08

## 2023-12-04 RX ORDER — SIMVASTATIN 20 MG
20 TABLET ORAL DAILY
Qty: 100 TABLET | Refills: 0 | Status: SHIPPED | OUTPATIENT
Start: 2023-12-04

## 2023-12-13 DIAGNOSIS — N89.8 VAGINAL DRYNESS: ICD-10-CM

## 2023-12-13 RX ORDER — ESTRADIOL 0.1 MG/G
1 CREAM VAGINAL DAILY
Qty: 42.5 G | Refills: 0 | Status: SHIPPED | OUTPATIENT
Start: 2023-12-13 | End: 2024-03-08

## 2023-12-20 ENCOUNTER — HOSPITAL ENCOUNTER (OUTPATIENT)
Dept: LAB | Facility: MEDICAL CENTER | Age: 73
End: 2023-12-20
Attending: STUDENT IN AN ORGANIZED HEALTH CARE EDUCATION/TRAINING PROGRAM
Payer: MEDICARE

## 2023-12-20 DIAGNOSIS — Z79.891 CHRONIC USE OF OPIATE FOR THERAPEUTIC PURPOSE: ICD-10-CM

## 2023-12-20 DIAGNOSIS — E89.0 POSTSURGICAL HYPOTHYROIDISM: ICD-10-CM

## 2023-12-20 DIAGNOSIS — E78.5 DYSLIPIDEMIA: ICD-10-CM

## 2023-12-20 LAB
ALBUMIN SERPL BCP-MCNC: 4.4 G/DL (ref 3.2–4.9)
ALBUMIN/GLOB SERPL: 1.6 G/DL
ALP SERPL-CCNC: 82 U/L (ref 30–99)
ALT SERPL-CCNC: 20 U/L (ref 2–50)
ANION GAP SERPL CALC-SCNC: 12 MMOL/L (ref 7–16)
AST SERPL-CCNC: 29 U/L (ref 12–45)
BILIRUB SERPL-MCNC: 0.4 MG/DL (ref 0.1–1.5)
BUN SERPL-MCNC: 13 MG/DL (ref 8–22)
CALCIUM ALBUM COR SERPL-MCNC: 9.9 MG/DL (ref 8.5–10.5)
CALCIUM SERPL-MCNC: 10.2 MG/DL (ref 8.5–10.5)
CHLORIDE SERPL-SCNC: 102 MMOL/L (ref 96–112)
CHOLEST SERPL-MCNC: 168 MG/DL (ref 100–199)
CO2 SERPL-SCNC: 25 MMOL/L (ref 20–33)
CREAT SERPL-MCNC: 0.9 MG/DL (ref 0.5–1.4)
ERYTHROCYTE [DISTWIDTH] IN BLOOD BY AUTOMATED COUNT: 46.3 FL (ref 35.9–50)
FASTING STATUS PATIENT QL REPORTED: NORMAL
GFR SERPLBLD CREATININE-BSD FMLA CKD-EPI: 67 ML/MIN/1.73 M 2
GLOBULIN SER CALC-MCNC: 2.8 G/DL (ref 1.9–3.5)
GLUCOSE SERPL-MCNC: 87 MG/DL (ref 65–99)
HCT VFR BLD AUTO: 47.2 % (ref 37–47)
HDLC SERPL-MCNC: 66 MG/DL
HGB BLD-MCNC: 15.8 G/DL (ref 12–16)
LDLC SERPL CALC-MCNC: 84 MG/DL
MCH RBC QN AUTO: 33.2 PG (ref 27–33)
MCHC RBC AUTO-ENTMCNC: 33.5 G/DL (ref 32.2–35.5)
MCV RBC AUTO: 99.2 FL (ref 81.4–97.8)
PLATELET # BLD AUTO: 315 K/UL (ref 164–446)
PMV BLD AUTO: 9.9 FL (ref 9–12.9)
POTASSIUM SERPL-SCNC: 5.4 MMOL/L (ref 3.6–5.5)
PROT SERPL-MCNC: 7.2 G/DL (ref 6–8.2)
RBC # BLD AUTO: 4.76 M/UL (ref 4.2–5.4)
SODIUM SERPL-SCNC: 139 MMOL/L (ref 135–145)
TRIGL SERPL-MCNC: 89 MG/DL (ref 0–149)
TSH SERPL DL<=0.005 MIU/L-ACNC: 2.31 UIU/ML (ref 0.38–5.33)
WBC # BLD AUTO: 5.3 K/UL (ref 4.8–10.8)

## 2023-12-20 PROCEDURE — 36415 COLL VENOUS BLD VENIPUNCTURE: CPT

## 2023-12-20 PROCEDURE — 80061 LIPID PANEL: CPT

## 2023-12-20 PROCEDURE — 85027 COMPLETE CBC AUTOMATED: CPT

## 2023-12-20 PROCEDURE — 84443 ASSAY THYROID STIM HORMONE: CPT

## 2023-12-20 PROCEDURE — 80053 COMPREHEN METABOLIC PANEL: CPT

## 2023-12-26 PROBLEM — F11.90 OPIOID USE: Status: ACTIVE | Noted: 2022-08-09

## 2024-01-10 ENCOUNTER — APPOINTMENT (OUTPATIENT)
Dept: ADMISSIONS | Facility: MEDICAL CENTER | Age: 74
End: 2024-01-10
Attending: STUDENT IN AN ORGANIZED HEALTH CARE EDUCATION/TRAINING PROGRAM
Payer: MEDICARE

## 2024-01-19 RX ORDER — OMEPRAZOLE 40 MG/1
40 CAPSULE, DELAYED RELEASE ORAL
Qty: 100 CAPSULE | Refills: 3 | Status: SHIPPED | OUTPATIENT
Start: 2024-01-19

## 2024-01-19 NOTE — TELEPHONE ENCOUNTER
Received request via: Pharmacy    Was the patient seen in the last year in this department? Yes    Does the patient have an active prescription (recently filled or refills available) for medication(s) requested? No    Pharmacy Name: [facility]    Does the patient have group home Plus and need 100 day supply (blood pressure, diabetes and cholesterol meds only)? Medication is not for cholesterol, blood pressure or diabetes

## 2024-01-20 NOTE — PROCEDURES
Procedure note: Complex urodynamic testing    Procedure performed:    -     64811 Complex Uroflowmetry  76650 Complex CMG w/ voiding pressure study AND urethral pressure  70626 EMG studies anal or urethral sphincter   35928 Intraabdominal catheter       Indication: Argelia Jimenez is a 73 y.o. year old with mixed urinary incontinence.   Bladder symptoms:               Voids per day: 6          Voids per night: 1 rarely                 Urinary incontinence episodes per day: 1-2               Urge leakage:  warm water trigger, but no incontinence              Stress leakage: With Cough and sneeze              Continuous / insensible urine loss: No               Nocturnal enuresis: No               Leakage volume: Drops              Number of pads/day: 1 pad              Bladder emptying: Complete              Voiding symptoms: Post-Void Dribble              UTI in last 12 months: last 3 years ago after starting d-mannose and estrogen              Other urologic history: none    She presents for complex urodynamic testing today to fully elucidate her bladder function and symptom pathophysiology, in order to guide further treatment, and to evaluate if an anti-incontinence procedure is indicated at her upcoming prolapse repair.     Verbal consent was obtained after review of risk and benefit.     Chaperone:  Eilot Gomez MA,     Procedure: The patient was taken to the urodynamic suite and placed in the urodynamic chair. She underwent sterile prep with betadine prior to catheterization. There was a negative urinalysis. Air-charged catheters were placed in the urethra/bladder and vagina. Urodynamics were performed using routine techniques. Prolapse was reduced with a cotton scopette for stress testing. There were no complications.     Antibiotic given: None    Urodynamic findings:     Preliminary Uroflometry     Flow pattern: Un-measurable, patient not able to void.   Post-void residual: 188 mL      Filling  cystometrogram    First sensation: 38 mL  First desire: 128 mL  Strong Desire: 563 mL  Urodynamic capacity: 568 mL  Stress leakage: Yes  Uninhibited detrusor contractions present: Yes  Leakage with DO: No  Leak point pressures  At 150mL volume: 134 cm H2O, no leak with and without catheter in place  At 300mL volume: 129 cm H2O, small leak noted with cough and valsalva  Compliance: Normal    Urethral pressure profile    Maximum urethral closing pressure (MUCP): 90 cm H2O  Morphology: Normal    Pressure voiding study    The patient's voiding mechanism was accomplished by detrusor contraction.  Max flow: 22 mL/sec  Average flow: 10 mL/sec  Post-void residual: 1 mL  Pdet at peak flow: -19 cm H2O, urethral catheter fell out  Flow time: 54 sec  Pelvic floor EMG silenced during voiding: Yes    Pelvic floor EMG: Normal       UDS procedure performed by SOSA Cohen RNFA      Assessment:     She has completed urodynamic testing, which was uncomplicated.     Filling phase: Normal capacity and compliance.  Stress incontinence noted towards capacity with average leak point pressures.  Normal uninhibited detrusor contractions.  Voiding phase: Unable to void on initial uroflow, likely secondary to prolapse, able to void with pressure flow with detrusor contraction.    Plan:   She was counseled on urodynamic findings  See office encounter for full procedural counseling  Counseled on normal post-UDS symptoms including burning and possible hematuria. If this persists after 2 days she should call or send [employer] message.         Antoni Patterson MD, FACOG    Female Pelvic Medicine and Reconstructive Surgery  Department of Obstetrics and Gynecology  [facility]

## 2024-01-22 ENCOUNTER — PRE-ADMISSION TESTING (OUTPATIENT)
Dept: ADMISSIONS | Facility: MEDICAL CENTER | Age: 74
End: 2024-01-22
Attending: STUDENT IN AN ORGANIZED HEALTH CARE EDUCATION/TRAINING PROGRAM
Payer: MEDICARE

## 2024-01-22 RX ORDER — LORATADINE 10 MG/1
10 TABLET ORAL DAILY
COMMUNITY
Start: 2023-09-01

## 2024-01-22 RX ORDER — DIPHENHYDRAMINE HCL 25 MG
25 TABLET ORAL NIGHTLY PRN
COMMUNITY
Start: 2024-01-01

## 2024-01-23 ENCOUNTER — PROCEDURE VISIT (OUTPATIENT)
Dept: GYNECOLOGY | Facility: CLINIC | Age: 74
End: 2024-01-23
Payer: MEDICARE

## 2024-01-23 VITALS — BODY MASS INDEX: 24.5 KG/M2 | WEIGHT: 151.8 LBS | SYSTOLIC BLOOD PRESSURE: 135 MMHG | DIASTOLIC BLOOD PRESSURE: 89 MMHG

## 2024-01-23 DIAGNOSIS — R39.82 CHRONIC BLADDER PAIN: ICD-10-CM

## 2024-01-23 DIAGNOSIS — N39.46 URINARY INCONTINENCE, MIXED: ICD-10-CM

## 2024-01-23 LAB
APPEARANCE UR: CLEAR
BILIRUB UR STRIP-MCNC: NEGATIVE MG/DL
COLOR UR AUTO: YELLOW
GLUCOSE UR STRIP.AUTO-MCNC: NEGATIVE MG/DL
KETONES UR STRIP.AUTO-MCNC: NEGATIVE MG/DL
LEUKOCYTE ESTERASE UR QL STRIP.AUTO: NEGATIVE
NITRITE UR QL STRIP.AUTO: NEGATIVE
PH UR STRIP.AUTO: 5.5 [PH] (ref 5–8)
PROT UR QL STRIP: NEGATIVE MG/DL
RBC UR QL AUTO: NEGATIVE
SP GR UR STRIP.AUTO: 1.01
UROBILINOGEN UR STRIP-MCNC: 0.2 MG/DL

## 2024-01-23 PROCEDURE — 99214 OFFICE O/P EST MOD 30 MIN: CPT | Mod: 25 | Performed by: STUDENT IN AN ORGANIZED HEALTH CARE EDUCATION/TRAINING PROGRAM

## 2024-01-23 PROCEDURE — 51797 INTRAABDOMINAL PRESSURE TEST: CPT | Performed by: STUDENT IN AN ORGANIZED HEALTH CARE EDUCATION/TRAINING PROGRAM

## 2024-01-23 PROCEDURE — 51784 ANAL/URINARY MUSCLE STUDY: CPT | Performed by: STUDENT IN AN ORGANIZED HEALTH CARE EDUCATION/TRAINING PROGRAM

## 2024-01-23 PROCEDURE — 51729 CYSTOMETROGRAM W/VP&UP: CPT | Performed by: STUDENT IN AN ORGANIZED HEALTH CARE EDUCATION/TRAINING PROGRAM

## 2024-01-23 PROCEDURE — 51741 ELECTRO-UROFLOWMETRY FIRST: CPT | Performed by: STUDENT IN AN ORGANIZED HEALTH CARE EDUCATION/TRAINING PROGRAM

## 2024-01-23 PROCEDURE — 81002 URINALYSIS NONAUTO W/O SCOPE: CPT | Performed by: NURSE PRACTITIONER

## 2024-01-23 ASSESSMENT — FIBROSIS 4 INDEX: FIB4 SCORE: 1.5

## 2024-01-23 NOTE — PROGRESS NOTES
Pt here for UDS + pre op   Pt states prolapse with walking, hard time emptying bladder, vulva is itching/irritated, pt reports dryness  No current UTI  Pharmacy confirmed.   Good # [phone]

## 2024-01-23 NOTE — Clinical Note
Hi Dr. Esparza,  For this mutual patient, she is undergoing a pelvic prolapse/incontinence repair on February 7, and is currently taking rivaroxaban.  She has held this for prior epidural procedures, but I wanted to confirm with you that she is able to hold this medication for 72 hours prior to her surgery and 1 day afterwards.  Please let me know if she requires any further cardiac workup before surgery.  Antoni Patterson MD

## 2024-01-23 NOTE — PROGRESS NOTES
Urogynecology and Pelvic Reconstructive Surgery Follow Up    Argelia Jimenez MRN:[id number]    Referred by: Jonas Metzger DO    Reason for Visit: Follow up      Subjective     History of Presenting Illness:     Argelia Jimenez is a 73 y.o. year old P2 who presents for follow up and pre-op counseling    She was supposed to undergo UDS testing today but has tested positive for COVID and visit was changed to virtual     Her symptoms started 8 days ago, but was tested yesterday for COVID. Symptoms include congestion and cough.     Prolapse has worsened since her last visit, and makes it harder for her to empty her bladder.       Initial HPI: She was referred by Dr. Metzger for the evaluation and management of pelvic organ prolapse. Patient has a chronic history of constipation and recurrent UTIs. For the past 3 years she has been using topical estrogen with good relief of UTIs. However while applying the estrogen 7-8 months ago, she noticed a vaginal bulge. It has been progressively worsening. She was seen by Dr. Metzger in March for this issue, and states the bulge has increased in size since then. It is not painful, rather just uncomfortable, and she notices it while walking. Ms. Jimenez additionally has some mild urinary incontinence for 10+ years which does not bother her at this time. She has not noticed any change in her bowel or bladder symptoms with the prolapse.      She denies history of abnormal Pap smears.  She has not had postmenopausal bleeding since her D&C is in her mid 50s.     Prior Pelvic surgery:   Ruptured ovarian cyst (laparotomy)  Appendectmoy  2x D&C for menorrhagia from fibroids and polyps (early menopause)                Prior treatment:   Miralax  D-mannose and estrogen for UTI                Fluid intake:   Half a gallon     Pelvic floor symptom review:                 Bladder:              Voids per day: 6          Voids per night: 1 rarely                 Urinary  incontinence episodes per day: 1-2               Urge leakage:  warm water trigger, but no incontinence              Stress leakage: With Cough and sneeze              Continuous / insensible urine loss: No               Nocturnal enuresis: No               Leakage volume: Drops              Number of pads/day: 1 pad              Bladder emptying: Complete              Voiding symptoms: Post-Void Dribble              UTI in last 12 months: last 3 years ago after starting d-mannose and estrogen              Other urologic history: none                 Prolapse:                Prolapse symptoms: Bulge, uncomfortable              Degree of prolapse: To Introitus              Duration of prolapse symptoms: 7-8 mo, intermittent, return                  Bowel:               Constipation: Yes, uses Miralax 2x weekly              Bowel movements per day: multiple small pellets a day 3-4x               Straining to empty bowels: Yes              Splinting to evacuate: Yes, not helping              Painful evacuation: No               Difficulty emptying rectum: Yes              Incontinence to stool: No              Blood in stool: Yes              Hemorrhoids: Yes              Bowel conditions: None, daughter with UC, brother diagnosed colon cancer in 40's              Most recent colonoscopy: <1 year ago, 1 polyp benign                  Sexual function:               Sexually active: No               Gender of partners: Male              Pain with intercourse: No                            Pelvic Pain: no        Past medical and surgical history     Past obstetric history              Number of vaginal deliveries: 2              Number of  deliveries: 0              History of vacuum/forceps: No               History of obstetric anal sphincter injury: No      Past gynecological history:               Last menstrual period: No LMP recorded. Patient is postmenopausal.              History of abnormal uterine  bleeding: Yes              History of fibroids: Yes              History of endometrial polyps:  Yes              History of endometriosis: No               History of cervical dysplasia: No               Last pap: 2023              Current contraception: None      Past medical history:  Past Medical History:   Diagnosis Date    Chronic bilateral low back pain with bilateral sciatica 10/31/2023    at present    PONV (postoperative nausea and vomiting) 10/31/2023    pt has history of motion sickness    Anesthesia 10/31/2023    severe PONV, history of motion sickness    Arthritis 10/31/2023    left wrist, right leg/hip    Cancer (HCC) 10/31/2023    thyroid at age 40    Hypertension 10/31/2023    medicated    Heart burn 10/31/2023    medicated    Indigestion 10/31/2023    medicated    Bowel habit changes 10/31/2023    constipation, medicated    Postsurgical hypothyroidism 10/31/2023    total thyroidectomy, medicated    Bronchitis 10/31/2023    history of    Adverse effect of anesthesia 1990 & 1993    Post-op vomitting, & neck/shoulder pain    Arrhythmia     PAC's,PVC's    Cataract 2017    Removed 2017 & 2018    Chronic use of opiate for therapeutic purpose     GERD (gastroesophageal reflux disease)     Gynecological disorder 2022    Pelvic Organ Prolapse-this surgery    High cholesterol 2021    Now taking Simvastatin    History of thyroid cancer     Hyperlipidemia     Insomnia     PAC (premature atrial contraction)     PSVT (paroxysmal supraventricular tachycardia)     Snoring 2010?    As reported by spouse    Urinary bladder disorder October 2020    Recurrent UTI    Urinary incontinence      Past surgical history:  Past Surgical History:   Procedure Laterality Date    OTHER Bilateral 10/31/2023    IOLOU    PB LAMINOTOMY,LUMBAR DISK,1 INTRSP N/A 03/03/2021    Procedure: LAMINECTOMY, SPINE, LUMBAR, WITH DISCECTOMY - L2-S1;  Surgeon: Robert Mcdaniel M.D.;  Location: SURGERY [facility];  Service: Neurosurgery    FORAMINOTOMY  N/A 03/03/2021    Procedure: FORAMINOTOMY, SPINE;  Surgeon: Robert Mcdaniel M.D.;  Location: SURGERY [facility];  Service: Neurosurgery    OTHER  12/2020    lipoma left arm    COLONOSCOPY  2016    normal    THYROIDECTOMY Right 1993    THYROIDECTOMY Left 1990    OTHER Left 1971    ruptured ovarian cyst/appendectomy    APPENDECTOMY      GYN SURGERY  1971    Ruptured ovarian cyst    OTHER NEUROLOGICAL SURG  March 2021    Laminectomy    TONSILLECTOMY       Medications:has a current medication list which includes the following prescription(s): diphenhydramine, loratadine, omeprazole, estradiol, levothyroxine, simvastatin, polyethylene glycol 3350, metoprolol sr, gabapentin, rivaroxaban, methocarbamol, fluticasone propionate, fiber, d-mannose, garlic, hydrocodone-acetaminophen, and omega-3 fatty acids.  Allergies:Patient has no known allergies.  Family history:  Family History   Problem Relation Age of Onset    Cancer Mother 60        breast cancer    Heart Disease Sister         rapid heartbeat    Cancer Brother         colon cancer    Alcohol/Drug Brother         alcoholism    No Known Problems Brother      Social history: reports that she has never smoked. She has never used smokeless tobacco. She reports current alcohol use of about 1.2 - 3.6 oz of alcohol per week. She reports that she does not use drugs.    Review of systems: A full review of systems was performed, and negative with the exception of want is noted above in the HPI.        Objective        There were no vitals taken for this visit.    Physical Exam  Vitals reviewed. Exam conducted with a chaperone present (MA - see notes.).   Constitutional:       Appearance: Normal appearance.   HENT:      Head: Normocephalic.      Mouth/Throat:      Mouth: Mucous membranes are moist.   Cardiovascular:      Rate and Rhythm: Normal rate.   Pulmonary:      Effort: Pulmonary effort is normal.   Abdominal:      Palpations: Abdomen is soft. There is no mass.      Tenderness:  There is no abdominal tenderness.   Skin:     General: Skin is warm and dry.   Neurological:      Mental Status: She is alert.   Psychiatric:         Mood and Affect: Mood normal.         Genitourinary:    External female genitalia: WNL   Vulva: WNL   Bulbocavernosus reflex: Intact   Anal wink reflex: Intact   Perineal sensation: WNL   Urethra: Hypermobile   Vagina: Atrophic   Atrophy: Mild   Cough stress test: Negative    Pelvic floor:    POP-Q: Aa +0.5 / Ba +0.5 / TVL 9 / C -3 / D -4 / Ap -1 / Bp -1 / GH 4 / PB 2  Aa / Ba 0 with apex supported    Prolapse stage: 2   Paravaginal defect: bilateral mild   Cervical elongation: No    Urethral tenderness: No    Bladder/ suprapubic tenderness: No    Levator tenderness: None   Levator muscle tone: Hypotonic   Pelvic floor contraction strength (modified Oxford scale): 2=Weak   Pelvic floor contraction duration: Brief    Bimanual exam: Small, Mobile Uterus     Procedure Performed: No    Diagnostic test and records review:    Urine dipstick: + nitrites - asymptomatic     Post-void residual: 12 mL, performed by Bladder Scanner    Labs: n/a    Radiology: n/a    Documentation reviewed: Prior EMR Records         Assessment & Plan     Argelia Jimenez is a 73 y.o. year old P2 with symptomatic stage II uterovaginal prolapse.     1. Incomplete uterovaginal prolapse  2. Cystocele, lateral  3. Rectocele  At her prior visit as well as today we reviewed all treatment options for prolapse including conservative/observation, pessary, and both vaginal and laparoscopic approaches as well as native tissue and mesh augmented approaches. After detailed counseling about all prolapse treatment options and shared decision-making, she opts for a mesh augmented reconstructive approach to prolapse repair via  robotic-assisted laparoscopic supracervical hysterectomy, bilateral salpingo-oophorectomy, sacrocolpopexy, possible posterior repair perineorrhaphy, retropubic midurethral sling and all  indicated procedures.  She has reviewed all preoperative written materials and expressed understanding about the procedure, risks, benefits, and recovery    Pre-operative urodynamics did demonstrate stress urinary incontinence with reduction of prolapse and she was counseled on observation versus a concomitant sling, and she opts for sling procedure to treat her leakage.    Benefits of surgery were reviewed, including functional outcomes (bladder/bowel/sexual). Risks of surgery were  also discussed including anesthesia, bleeding, infection, damage to surrounding organs (bladder, ureter, urethra, bowel, blood vessel, nerves), possible blood transfusion, recurrent prolapse, transient buttock pain if sacrospinous suspension performed, mesh exposure/erosion, transient voiding dysfunction requiring catheterization, new/worsening urinary incontinence, pain with sex.  She understands that stopping her blood thinner does create risk while holding for cardiovascular/thromboembolic events even if cleared by cardiology.  She also is at high risk for postoperative bleeding complications when Evelin she restarts her anticoagulation.  Message sent to Dr. Basurto to approve holding rivaroxaban    Specifically she was counselled as to what to expect on the day of surgery in the holding area, counseled that medical students may be involved in her care. She will likely go home on the same day as the surgery. She was counseled on what to expect in the recovery room including voiding trial and possible vaginal packing removal, as well as what to expect if voiding trial is unsuccessful, which would be transient catheterization.   Discussed trajectory of recovery, including maximizing NSAIDs and Tylenol, and that narcotics are not routinely given.  Discussed restrictions including heavy lifting that requires straining, driving while on narcotics, nothing in the vagina and no bathing/swimming for at least 6 weeks until evaluated  in the office.  Return to work discussed.  *Please see the corresponding after visit summary counseling packet for detailed counseling provided for the patient.     Pre-op meds: acetaminophen 1000mg PO, phenazopyridine 200mg PO, Cefazolin 2gm IV   Post-op medication plan: ibuprofen, tylenol, miralax - norco at home if needed  Home medication review: Take metoprolol on day of surgery. She should additionally hold all NSAIDs and supplements for 1 week prior to surgery. Stop rivaroxiban 72 hours before surgery.     4. Urinary incontinence, mixed  Urodynamic showed some detrusor contractions but stress predominantly gets towards capacity.  This matches her current symptoms.  She is counseled on observing this versus placing a sling in order to prevent worsening of incontinence with apical/anterior lift.  She also move forward with scheduling sling procedure at time of surgery.  She is aware that urgency may or may not improve with prolapse repair, although it does not two thirds of patients.  If not, she will move further down the OAB treatment pathway  - POCT Urinalysis    5. Atrophic vaginitis  She has vaginal atrophy on examination. Reviewed risks, benefits, and indications for vaginal estrogen therapy.  Continue with vaginal estrogen therapy twice weekly.                Antoni Patterson MD, FACOG  Urogynecology and Pelvic Reconstructive Surgery  Department of Obstetrics and Gynecology  [facility]        This medical record contains text that has been entered with the assistance of computer voice recognition and dictation software.  Therefore, it may contain unintended errors in text, spelling, punctuation, or grammar

## 2024-01-29 ENCOUNTER — PRE-ADMISSION TESTING (OUTPATIENT)
Dept: ADMISSIONS | Facility: MEDICAL CENTER | Age: 74
End: 2024-01-29
Attending: STUDENT IN AN ORGANIZED HEALTH CARE EDUCATION/TRAINING PROGRAM
Payer: MEDICARE

## 2024-01-29 ENCOUNTER — PATIENT MESSAGE (OUTPATIENT)
Dept: CARDIOLOGY | Facility: MEDICAL CENTER | Age: 74
End: 2024-01-29

## 2024-01-29 DIAGNOSIS — Z01.810 PREOPERATIVE CARDIOVASCULAR EXAMINATION: ICD-10-CM

## 2024-01-29 DIAGNOSIS — Z01.812 PRE-PROCEDURAL LABORATORY EXAMINATION: ICD-10-CM

## 2024-01-29 DIAGNOSIS — R94.31 ABNORMAL EKG: ICD-10-CM

## 2024-01-29 DIAGNOSIS — Z01.810 PRE-PROCEDURAL CARDIOVASCULAR EXAMINATION: ICD-10-CM

## 2024-01-29 LAB
ANION GAP SERPL CALC-SCNC: 13 MMOL/L (ref 7–16)
BASOPHILS # BLD AUTO: 1 % (ref 0–1.8)
BASOPHILS # BLD: 0.06 K/UL (ref 0–0.12)
BUN SERPL-MCNC: 16 MG/DL (ref 8–22)
CALCIUM SERPL-MCNC: 9.8 MG/DL (ref 8.5–10.5)
CHLORIDE SERPL-SCNC: 103 MMOL/L (ref 96–112)
CO2 SERPL-SCNC: 24 MMOL/L (ref 20–33)
CREAT SERPL-MCNC: 0.95 MG/DL (ref 0.5–1.4)
EKG IMPRESSION: NORMAL
EOSINOPHIL # BLD AUTO: 0.15 K/UL (ref 0–0.51)
EOSINOPHIL NFR BLD: 2.6 % (ref 0–6.9)
ERYTHROCYTE [DISTWIDTH] IN BLOOD BY AUTOMATED COUNT: 47.6 FL (ref 35.9–50)
EST. AVERAGE GLUCOSE BLD GHB EST-MCNC: 103 MG/DL
GFR SERPLBLD CREATININE-BSD FMLA CKD-EPI: 63 ML/MIN/1.73 M 2
GLUCOSE SERPL-MCNC: 76 MG/DL (ref 65–99)
HBA1C MFR BLD: 5.2 % (ref 4–5.6)
HCT VFR BLD AUTO: 45.4 % (ref 37–47)
HGB BLD-MCNC: 15.2 G/DL (ref 12–16)
IMM GRANULOCYTES # BLD AUTO: 0.02 K/UL (ref 0–0.11)
IMM GRANULOCYTES NFR BLD AUTO: 0.3 % (ref 0–0.9)
LYMPHOCYTES # BLD AUTO: 2.19 K/UL (ref 1–4.8)
LYMPHOCYTES NFR BLD: 38 % (ref 22–41)
MCH RBC QN AUTO: 33.4 PG (ref 27–33)
MCHC RBC AUTO-ENTMCNC: 33.5 G/DL (ref 32.2–35.5)
MCV RBC AUTO: 99.8 FL (ref 81.4–97.8)
MONOCYTES # BLD AUTO: 0.63 K/UL (ref 0–0.85)
MONOCYTES NFR BLD AUTO: 10.9 % (ref 0–13.4)
NEUTROPHILS # BLD AUTO: 2.71 K/UL (ref 1.82–7.42)
NEUTROPHILS NFR BLD: 47.2 % (ref 44–72)
NRBC # BLD AUTO: 0 K/UL
NRBC BLD-RTO: 0 /100 WBC (ref 0–0.2)
PLATELET # BLD AUTO: 282 K/UL (ref 164–446)
PMV BLD AUTO: 10 FL (ref 9–12.9)
POTASSIUM SERPL-SCNC: 4.4 MMOL/L (ref 3.6–5.5)
RBC # BLD AUTO: 4.55 M/UL (ref 4.2–5.4)
SODIUM SERPL-SCNC: 140 MMOL/L (ref 135–145)
WBC # BLD AUTO: 5.8 K/UL (ref 4.8–10.8)

## 2024-01-29 PROCEDURE — 93005 ELECTROCARDIOGRAM TRACING: CPT

## 2024-01-29 PROCEDURE — 93010 ELECTROCARDIOGRAM REPORT: CPT | Performed by: INTERNAL MEDICINE

## 2024-01-29 PROCEDURE — 80048 BASIC METABOLIC PNL TOTAL CA: CPT

## 2024-01-29 PROCEDURE — 36415 COLL VENOUS BLD VENIPUNCTURE: CPT

## 2024-01-29 PROCEDURE — 83036 HEMOGLOBIN GLYCOSYLATED A1C: CPT

## 2024-01-29 PROCEDURE — 85025 COMPLETE CBC W/AUTO DIFF WBC: CPT

## 2024-01-30 NOTE — RESULT ENCOUNTER NOTE
Thanks for the information will get a stat echocardiogram to look for wall motion.  Should be able to get that information before planned procedure and will like to know

## 2024-01-31 ENCOUNTER — HOSPITAL ENCOUNTER (OUTPATIENT)
Dept: CARDIOLOGY | Facility: MEDICAL CENTER | Age: 74
End: 2024-01-31
Attending: INTERNAL MEDICINE
Payer: MEDICARE

## 2024-01-31 ENCOUNTER — TELEPHONE (OUTPATIENT)
Dept: CARDIOLOGY | Facility: MEDICAL CENTER | Age: 74
End: 2024-01-31
Payer: MEDICARE

## 2024-01-31 DIAGNOSIS — R94.31 ABNORMAL EKG: ICD-10-CM

## 2024-01-31 DIAGNOSIS — Z01.810 PREOPERATIVE CARDIOVASCULAR EXAMINATION: ICD-10-CM

## 2024-01-31 LAB
LV EJECT FRACT  99904: 55
LV EJECT FRACT MOD 2C 99903: 58.01
LV EJECT FRACT MOD 4C 99902: 55.48
LV EJECT FRACT MOD BP 99901: 55.15

## 2024-01-31 PROCEDURE — 93306 TTE W/DOPPLER COMPLETE: CPT | Mod: 26 | Performed by: INTERNAL MEDICINE

## 2024-01-31 PROCEDURE — 93306 TTE W/DOPPLER COMPLETE: CPT

## 2024-01-31 PROCEDURE — 700117 HCHG RX CONTRAST REV CODE 255: Performed by: INTERNAL MEDICINE

## 2024-01-31 RX ADMIN — HUMAN ALBUMIN MICROSPHERES AND PERFLUTREN 3 ML: 10; .22 INJECTION, SOLUTION INTRAVENOUS at 17:45

## 2024-01-31 NOTE — TELEPHONE ENCOUNTER
Sent to me 2 days ago when I was scheduled out of office. Reviewed chart, 1/29 pt sent msg asking about stat echo and RN replied informing of need for this test via [employer].     Nothing further needed.

## 2024-01-31 NOTE — TELEPHONE ENCOUNTER
----- Message from Davis Esparza M.D. sent at 1/29/2024  5:56 PM PST -----  Regarding: Preoperative echocardiogram  Lori would you notify this patient to work on her As a preoperative echocardiogram.  I ordered stat.  Let me know if there is any questions

## 2024-02-01 ENCOUNTER — PATIENT MESSAGE (OUTPATIENT)
Dept: CARDIOLOGY | Facility: MEDICAL CENTER | Age: 74
End: 2024-02-01
Payer: MEDICARE

## 2024-02-01 ENCOUNTER — TELEPHONE (OUTPATIENT)
Dept: CARDIOLOGY | Facility: MEDICAL CENTER | Age: 74
End: 2024-02-01
Payer: MEDICARE

## 2024-02-01 NOTE — TELEPHONE ENCOUNTER
SHAYLEE        Caller: Argelia Jimenez      Topic/issue: Patient was returning a call she received earlier and wanted a call back if it was to discuss her echo she completed      Callback Number: [phone]        Thank you      -Abiel SHAH

## 2024-02-01 NOTE — TELEPHONE ENCOUNTER
Reviewed echo result note with SW's feedback as below:    Davis Esparza M.D.   Please notify Argelia that I have reviewed her echocardiogram  I tried to call the patient twice about the results of her echocardiogram done because of an abnormal preop EKG  Echocardiogram shows normal function and wall motion; not concern about mild hypertrophy  Unless she is having new heart related symptoms she is cleared for her hysterectomy; moderate risk otherwise if she is make early follow up with me    S/W pt, relayed above message and let her know that our office will process her procedure clearance. Forwarding to MA's per protocol.

## 2024-02-01 NOTE — RESULT ENCOUNTER NOTE
Please notify Argelia that I have reviewed her echocardiogram   I tried to call the patient twice about the results of her echocardiogram done because of an abnormal preop EKG  Echocardiogram shows normal function and wall motion; not concern about mild hypertrophy  Unless she is having new heart related symptoms she is cleared for her hysterectomy; moderate risk otherwise if she is make early follow up with me

## 2024-02-01 NOTE — PATIENT COMMUNICATION
"Last OV: 11/3/23  Proposed Surgery: Hysterectomy  Surgery Date: 2/17/24  Requesting Office Name: Dr. Patterson office  Fax Number: [phone]  Preference of Location (default is surgery center unless specified by Cardiologist or MARITZA)  Prior Clearance Addressed: Yes       \"Unless she is having new heart related symptoms she is cleared for her hysterectomy; moderate risk otherwise if she is make early follow up with me.\"          Surgical Clearance Letter Sent: YES   **Scan clearance request letter into [facility].**    "

## 2024-02-05 NOTE — H&P
"Urogynecology and Pelvic Reconstructive Surgery Pre-op H&P    Argelia Jimenez MRN:[id number]    Referred by: Jonas Metzger DO    Reason for Visit: Follow up      Subjective     History of Presenting Illness:     Argelia Jimenez is a 73 y.o. year old P2 who presents for follow up and pre-op counseling    Prolapse has worsened since her last visit, and makes it harder for her to empty her bladder. She has read all pre-op counseling and beings questions.     Her preop EKG was somewhat abnormal, she was evaluated urgently by her cardiologist Dr. Esparza, who performed an echo.  He noted that \"the echocardiogram shows normal function and wall motion, no concern about mild hypertrophy.  Unless she is having new heart related symptoms she is cleared for her hysterectomy.  Moderate risk otherwise. She can stop anticoagulation 72-96 hrs prior to surgery and to be restarted when you feel postoperative bleeding risk is acceptable\"      Initial HPI: She was referred by Dr. Metzger for the evaluation and management of pelvic organ prolapse. Patient has a chronic history of constipation and recurrent UTIs. For the past 3 years she has been using topical estrogen with good relief of UTIs. However while applying the estrogen 7-8 months ago, she noticed a vaginal bulge. It has been progressively worsening. She was seen by Dr. Metzger in March for this issue, and states the bulge has increased in size since then. It is not painful, rather just uncomfortable, and she notices it while walking. Ms. Jimenez additionally has some mild urinary incontinence for 10+ years which does not bother her at this time. She has not noticed any change in her bowel or bladder symptoms with the prolapse.      She denies history of abnormal Pap smears.  She has not had postmenopausal bleeding since her D&C is in her mid 50s.     Prior Pelvic surgery:   Ruptured ovarian cyst (laparotomy)  Appendectmoy  2x D&C for menorrhagia from " fibroids and polyps (early menopause)                Prior treatment:   Miralax  D-mannose and estrogen for UTI                Fluid intake:   Half a gallon     Pelvic floor symptom review:                 Bladder:              Voids per day: 6          Voids per night: 1 rarely                 Urinary incontinence episodes per day: 1-2               Urge leakage:  warm water trigger, but no incontinence              Stress leakage: With Cough and sneeze              Continuous / insensible urine loss: No               Nocturnal enuresis: No               Leakage volume: Drops              Number of pads/day: 1 pad              Bladder emptying: Complete              Voiding symptoms: Post-Void Dribble              UTI in last 12 months: last 3 years ago after starting d-mannose and estrogen              Other urologic history: none                 Prolapse:                Prolapse symptoms: Bulge, uncomfortable              Degree of prolapse: To Introitus              Duration of prolapse symptoms: 7-8 mo, intermittent, return                  Bowel:               Constipation: Yes, uses Miralax 2x weekly              Bowel movements per day: multiple small pellets a day 3-4x               Straining to empty bowels: Yes              Splinting to evacuate: Yes, not helping              Painful evacuation: No               Difficulty emptying rectum: Yes              Incontinence to stool: No              Blood in stool: Yes              Hemorrhoids: Yes              Bowel conditions: None, daughter with UC, brother diagnosed colon cancer in 40's              Most recent colonoscopy: <1 year ago, 1 polyp benign                  Sexual function:               Sexually active: No               Gender of partners: Male              Pain with intercourse: No                            Pelvic Pain: no        Past medical and surgical history     Past obstetric history              Number of vaginal deliveries: 2               Number of  deliveries: 0              History of vacuum/forceps: No               History of obstetric anal sphincter injury: No      Past gynecological history:               Last menstrual period: No LMP recorded. Patient is postmenopausal.              History of abnormal uterine bleeding: Yes              History of fibroids: Yes              History of endometrial polyps:  Yes              History of endometriosis: No               History of cervical dysplasia: No               Last pap:               Current contraception: None      Past medical history:  Past Medical History:   Diagnosis Date    Chronic bilateral low back pain with bilateral sciatica 10/31/2023    at present    PONV (postoperative nausea and vomiting) 10/31/2023    pt has history of motion sickness    Anesthesia 10/31/2023    severe PONV, history of motion sickness    Arthritis 10/31/2023    left wrist, right leg/hip    Cancer (HCC) 10/31/2023    thyroid at age 40    Hypertension 10/31/2023    medicated    Heart burn 10/31/2023    medicated    Indigestion 10/31/2023    medicated    Bowel habit changes 10/31/2023    constipation, medicated    Postsurgical hypothyroidism 10/31/2023    total thyroidectomy, medicated    Bronchitis 10/31/2023    history of    Adverse effect of anesthesia  &     Post-op vomitting, & neck/shoulder pain    Arrhythmia     PAC's,PVC's    Cataract 2017    Removed  &     Chronic use of opiate for therapeutic purpose     GERD (gastroesophageal reflux disease)     Gynecological disorder     Pelvic Organ Prolapse-this surgery    High cholesterol     Now taking Simvastatin    History of thyroid cancer     Hyperlipidemia     Insomnia     PAC (premature atrial contraction)     PSVT (paroxysmal supraventricular tachycardia)     Snoring ?    As reported by spouse    Urinary bladder disorder 2020    Recurrent UTI    Urinary incontinence      Past surgical history:  Past  Surgical History:   Procedure Laterality Date    OTHER Bilateral 10/31/2023    IOLOU    PB LAMINOTOMY,LUMBAR DISK,1 INTRSP N/A 03/03/2021    Procedure: LAMINECTOMY, SPINE, LUMBAR, WITH DISCECTOMY - L2-S1;  Surgeon: Robert Mcdaniel M.D.;  Location: SURGERY [facility];  Service: Neurosurgery    FORAMINOTOMY N/A 03/03/2021    Procedure: FORAMINOTOMY, SPINE;  Surgeon: Robert Mcdaniel M.D.;  Location: SURGERY [facility];  Service: Neurosurgery    OTHER  12/2020    lipoma left arm    COLONOSCOPY  2016    normal    THYROIDECTOMY Right 1993    THYROIDECTOMY Left 1990    OTHER Left 1971    ruptured ovarian cyst/appendectomy    APPENDECTOMY      GYN SURGERY  1971    Ruptured ovarian cyst    OTHER NEUROLOGICAL SURG  March 2021    Laminectomy    TONSILLECTOMY       Medications:has a current medication list which includes the following prescription(s): diphenhydramine, loratadine, omeprazole, estradiol, levothyroxine, simvastatin, polyethylene glycol 3350, metoprolol sr, gabapentin, rivaroxaban, methocarbamol, fluticasone propionate, fiber, d-mannose, garlic, hydrocodone-acetaminophen, and omega-3 fatty acids.  Allergies:Patient has no known allergies.  Family history:  Family History   Problem Relation Age of Onset    Cancer Mother 60        breast cancer    Heart Disease Sister         rapid heartbeat    Cancer Brother         colon cancer    Alcohol/Drug Brother         alcoholism    No Known Problems Brother      Social history: reports that she has never smoked. She has never used smokeless tobacco. She reports current alcohol use of about 1.2 - 3.6 oz of alcohol per week. She reports that she does not use drugs.    Review of systems: A full review of systems was performed, and negative with the exception of want is noted above in the HPI.        Objective        There were no vitals taken for this visit.    Physical Exam  Vitals reviewed. Exam conducted with a chaperone present (MA - see notes.).   Constitutional:        Appearance: Normal appearance.   HENT:      Head: Normocephalic.      Mouth/Throat:      Mouth: Mucous membranes are moist.   Cardiovascular:      Rate and Rhythm: Normal rate.   Pulmonary:      Effort: Pulmonary effort is normal.   Abdominal:      Palpations: Abdomen is soft. There is no mass.      Tenderness: There is no abdominal tenderness.   Skin:     General: Skin is warm and dry.   Neurological:      Mental Status: She is alert.   Psychiatric:         Mood and Affect: Mood normal.         Genitourinary:    External female genitalia: WNL   Vulva: WNL   Bulbocavernosus reflex: Intact   Anal wink reflex: Intact   Perineal sensation: WNL   Urethra: Hypermobile   Vagina: Atrophic   Atrophy: Mild   Cough stress test: Negative    Pelvic floor:    POP-Q: Aa +0.5 / Ba +0.5 / TVL 9 / C -3 / D -4 / Ap -1 / Bp -1 / GH 4 / PB 2  Aa / Ba 0 with apex supported    Prolapse stage: 2   Paravaginal defect: bilateral mild   Cervical elongation: No    Urethral tenderness: No    Bladder/ suprapubic tenderness: No    Levator tenderness: None   Levator muscle tone: Hypotonic   Pelvic floor contraction strength (modified Oxford scale): 2=Weak   Pelvic floor contraction duration: Brief    Bimanual exam: Small, Mobile Uterus     Procedure Performed: No    Diagnostic test and records review:    Urine dipstick: + nitrites - asymptomatic     Post-void residual: 12 mL, performed by Bladder Scanner    Labs: n/a    Radiology: n/a    Documentation reviewed: Prior EMR Records         Assessment & Plan     Argelia Jimenez is a 73 y.o. year old P2 with symptomatic stage II uterovaginal prolapse.     1. Incomplete uterovaginal prolapse  2. Cystocele, lateral  3. Rectocele  At her prior visit as well as today we reviewed all treatment options for prolapse including conservative/observation, pessary, and both vaginal and laparoscopic approaches as well as native tissue and mesh augmented approaches. After detailed counseling about all prolapse  "treatment options and shared decision-making, she opts for a mesh augmented reconstructive approach to prolapse repair via  robotic-assisted laparoscopic supracervical hysterectomy, bilateral salpingo-oophorectomy, sacrocolpopexy, possible posterior repair perineorrhaphy, retropubic midurethral sling and all indicated procedures.  She has reviewed all preoperative written materials and expressed understanding about the procedure, risks, benefits, and recovery    Pre-operative urodynamics did demonstrate stress urinary incontinence with reduction of prolapse and she was counseled on observation versus a concomitant sling, and she opts for sling procedure to treat her leakage.    Benefits of surgery were reviewed, including functional outcomes (bladder/bowel/sexual). Risks of surgery were  also discussed including anesthesia, bleeding, infection, damage to surrounding organs (bladder, ureter, urethra, bowel, blood vessel, nerves), possible blood transfusion, recurrent prolapse, transient buttock pain if sacrospinous suspension performed, mesh exposure/erosion, transient voiding dysfunction requiring catheterization, new/worsening urinary incontinence, pain with sex.  She understands that stopping her blood thinner does create risk while holding for cardiovascular/thromboembolic events even if cleared by cardiology.  She also is at high risk for postoperative bleeding complications when Evelin she restarts her anticoagulation.     In regards to cardiology eval for surgery: Her preop EKG was somewhat abnormal, she was evaluated urgently by her cardiologist Dr. Esparza, who performed an echo.  He noted that \"the echocardiogram shows normal function and wall motion, no concern about mild hypertrophy.  Unless she is having new heart related symptoms she is cleared for her hysterectomy.  Moderate risk otherwise. She can stop anticoagulation 72-96 hrs prior to surgery and to be restarted when you feel postoperative " "bleeding risk is acceptable\"    Specifically she was counselled as to what to expect on the day of surgery in the holding area, counseled that medical students may be involved in her care. She will likely go home on the same day as the surgery. She was counseled on what to expect in the recovery room including voiding trial and possible vaginal packing removal, as well as what to expect if voiding trial is unsuccessful, which would be transient catheterization.   Discussed trajectory of recovery, including maximizing NSAIDs and Tylenol, and that narcotics are not routinely given.  Discussed restrictions including heavy lifting that requires straining, driving while on narcotics, nothing in the vagina and no bathing/swimming for at least 6 weeks until evaluated in the office.  Return to work discussed.  *Please see the corresponding after visit summary counseling packet for detailed counseling provided for the patient.     Pre-op meds: acetaminophen 1000mg PO, phenazopyridine 200mg PO, Cefazolin 2gm IV   Post-op medication plan: ibuprofen, tylenol, miralax - norco at home if needed  Home medication review: Take metoprolol on day of surgery. She should additionally hold all NSAIDs and supplements for 1 week prior to surgery. Stop rivaroxiban 72 hours before surgery.     4. Urinary incontinence, mixed  Urodynamic showed some detrusor contractions but stress predominantly gets towards capacity.  This matches her current symptoms.  She is counseled on observing this versus placing a sling in order to prevent worsening of incontinence with apical/anterior lift.  She also move forward with scheduling sling procedure at time of surgery.  She is aware that urgency may or may not improve with prolapse repair, although it does not two thirds of patients.  If not, she will move further down the OAB treatment pathway  - POCT Urinalysis    5. Atrophic vaginitis  She has vaginal atrophy on examination. Reviewed risks, benefits, " and indications for vaginal estrogen therapy.  Continue with vaginal estrogen therapy twice weekly.                Antoni Patterson MD, FACOG  Urogynecology and Pelvic Reconstructive Surgery  Department of Obstetrics and Gynecology  [facility]        This medical record contains text that has been entered with the assistance of computer voice recognition and dictation software.  Therefore, it may contain unintended errors in text, spelling, punctuation, or grammar

## 2024-02-06 ENCOUNTER — ANESTHESIA EVENT (OUTPATIENT)
Dept: SURGERY | Facility: MEDICAL CENTER | Age: 74
End: 2024-02-06
Payer: MEDICARE

## 2024-02-06 ASSESSMENT — FIBROSIS 4 INDEX: FIB4 SCORE: 1.68

## 2024-02-07 ENCOUNTER — ANESTHESIA (OUTPATIENT)
Dept: SURGERY | Facility: MEDICAL CENTER | Age: 74
End: 2024-02-07
Payer: MEDICARE

## 2024-02-07 ENCOUNTER — HOSPITAL ENCOUNTER (OUTPATIENT)
Facility: MEDICAL CENTER | Age: 74
End: 2024-02-07
Attending: STUDENT IN AN ORGANIZED HEALTH CARE EDUCATION/TRAINING PROGRAM | Admitting: STUDENT IN AN ORGANIZED HEALTH CARE EDUCATION/TRAINING PROGRAM
Payer: MEDICARE

## 2024-02-07 ENCOUNTER — PHARMACY VISIT (OUTPATIENT)
Dept: PHARMACY | Facility: MEDICAL CENTER | Age: 74
End: 2024-02-07
Payer: COMMERCIAL

## 2024-02-07 VITALS
HEART RATE: 71 BPM | OXYGEN SATURATION: 95 % | RESPIRATION RATE: 18 BRPM | SYSTOLIC BLOOD PRESSURE: 132 MMHG | BODY MASS INDEX: 23.56 KG/M2 | DIASTOLIC BLOOD PRESSURE: 75 MMHG | TEMPERATURE: 97.6 F | HEIGHT: 66 IN | WEIGHT: 146.61 LBS

## 2024-02-07 DIAGNOSIS — R11.2 PONV (POSTOPERATIVE NAUSEA AND VOMITING): ICD-10-CM

## 2024-02-07 DIAGNOSIS — Z98.890 PONV (POSTOPERATIVE NAUSEA AND VOMITING): ICD-10-CM

## 2024-02-07 DIAGNOSIS — G89.18 ACUTE POST-OPERATIVE PAIN: ICD-10-CM

## 2024-02-07 PROBLEM — N39.3 SUI (STRESS URINARY INCONTINENCE, FEMALE): Status: ACTIVE | Noted: 2024-02-07

## 2024-02-07 LAB — PATHOLOGY CONSULT NOTE: NORMAL

## 2024-02-07 PROCEDURE — 58542 LSH W/T/O UT 250 G OR LESS: CPT | Mod: AS | Performed by: NURSE PRACTITIONER

## 2024-02-07 PROCEDURE — 160036 HCHG PACU - EA ADDL 30 MINS PHASE I: Performed by: STUDENT IN AN ORGANIZED HEALTH CARE EDUCATION/TRAINING PROGRAM

## 2024-02-07 PROCEDURE — 160046 HCHG PACU - 1ST 60 MINS PHASE II: Performed by: STUDENT IN AN ORGANIZED HEALTH CARE EDUCATION/TRAINING PROGRAM

## 2024-02-07 PROCEDURE — 700101 HCHG RX REV CODE 250: Performed by: ANESTHESIOLOGY

## 2024-02-07 PROCEDURE — 700102 HCHG RX REV CODE 250 W/ 637 OVERRIDE(OP): Performed by: ANESTHESIOLOGY

## 2024-02-07 PROCEDURE — RXMED WILLOW AMBULATORY MEDICATION CHARGE: Performed by: NURSE PRACTITIONER

## 2024-02-07 PROCEDURE — 57425 LAPAROSCOPY SURG COLPOPEXY: CPT | Performed by: STUDENT IN AN ORGANIZED HEALTH CARE EDUCATION/TRAINING PROGRAM

## 2024-02-07 PROCEDURE — 700101 HCHG RX REV CODE 250: Performed by: STUDENT IN AN ORGANIZED HEALTH CARE EDUCATION/TRAINING PROGRAM

## 2024-02-07 PROCEDURE — 88307 TISSUE EXAM BY PATHOLOGIST: CPT

## 2024-02-07 PROCEDURE — 57425 LAPAROSCOPY SURG COLPOPEXY: CPT | Mod: AS | Performed by: NURSE PRACTITIONER

## 2024-02-07 PROCEDURE — 700102 HCHG RX REV CODE 250 W/ 637 OVERRIDE(OP): Performed by: STUDENT IN AN ORGANIZED HEALTH CARE EDUCATION/TRAINING PROGRAM

## 2024-02-07 PROCEDURE — 160042 HCHG SURGERY MINUTES - EA ADDL 1 MIN LEVEL 5: Performed by: STUDENT IN AN ORGANIZED HEALTH CARE EDUCATION/TRAINING PROGRAM

## 2024-02-07 PROCEDURE — 160002 HCHG RECOVERY MINUTES (STAT): Performed by: STUDENT IN AN ORGANIZED HEALTH CARE EDUCATION/TRAINING PROGRAM

## 2024-02-07 PROCEDURE — 700111 HCHG RX REV CODE 636 W/ 250 OVERRIDE (IP): Mod: JZ | Performed by: ANESTHESIOLOGY

## 2024-02-07 PROCEDURE — A9270 NON-COVERED ITEM OR SERVICE: HCPCS | Performed by: ANESTHESIOLOGY

## 2024-02-07 PROCEDURE — A9270 NON-COVERED ITEM OR SERVICE: HCPCS | Performed by: STUDENT IN AN ORGANIZED HEALTH CARE EDUCATION/TRAINING PROGRAM

## 2024-02-07 PROCEDURE — 88311 DECALCIFY TISSUE: CPT

## 2024-02-07 PROCEDURE — 160009 HCHG ANES TIME/MIN: Performed by: STUDENT IN AN ORGANIZED HEALTH CARE EDUCATION/TRAINING PROGRAM

## 2024-02-07 PROCEDURE — 502714 HCHG ROBOTIC SURGERY SERVICES: Performed by: STUDENT IN AN ORGANIZED HEALTH CARE EDUCATION/TRAINING PROGRAM

## 2024-02-07 PROCEDURE — 700111 HCHG RX REV CODE 636 W/ 250 OVERRIDE (IP): Performed by: ANESTHESIOLOGY

## 2024-02-07 PROCEDURE — 700111 HCHG RX REV CODE 636 W/ 250 OVERRIDE (IP): Mod: JZ | Performed by: STUDENT IN AN ORGANIZED HEALTH CARE EDUCATION/TRAINING PROGRAM

## 2024-02-07 PROCEDURE — 160031 HCHG SURGERY MINUTES - 1ST 30 MINS LEVEL 5: Performed by: STUDENT IN AN ORGANIZED HEALTH CARE EDUCATION/TRAINING PROGRAM

## 2024-02-07 PROCEDURE — 160048 HCHG OR STATISTICAL LEVEL 1-5: Performed by: STUDENT IN AN ORGANIZED HEALTH CARE EDUCATION/TRAINING PROGRAM

## 2024-02-07 PROCEDURE — 160035 HCHG PACU - 1ST 60 MINS PHASE I: Performed by: STUDENT IN AN ORGANIZED HEALTH CARE EDUCATION/TRAINING PROGRAM

## 2024-02-07 PROCEDURE — C1781 MESH (IMPLANTABLE): HCPCS | Performed by: STUDENT IN AN ORGANIZED HEALTH CARE EDUCATION/TRAINING PROGRAM

## 2024-02-07 PROCEDURE — 160025 RECOVERY II MINUTES (STATS): Performed by: STUDENT IN AN ORGANIZED HEALTH CARE EDUCATION/TRAINING PROGRAM

## 2024-02-07 PROCEDURE — 57260 CMBN ANT PST COLPRHY: CPT | Mod: AS | Performed by: NURSE PRACTITIONER

## 2024-02-07 PROCEDURE — 700105 HCHG RX REV CODE 258: Performed by: STUDENT IN AN ORGANIZED HEALTH CARE EDUCATION/TRAINING PROGRAM

## 2024-02-07 PROCEDURE — 58542 LSH W/T/O UT 250 G OR LESS: CPT | Performed by: STUDENT IN AN ORGANIZED HEALTH CARE EDUCATION/TRAINING PROGRAM

## 2024-02-07 PROCEDURE — 57288 REPAIR BLADDER DEFECT: CPT | Performed by: STUDENT IN AN ORGANIZED HEALTH CARE EDUCATION/TRAINING PROGRAM

## 2024-02-07 PROCEDURE — 57288 REPAIR BLADDER DEFECT: CPT | Mod: AS | Performed by: NURSE PRACTITIONER

## 2024-02-07 PROCEDURE — C1771 REP DEV, URINARY, W/SLING: HCPCS | Performed by: STUDENT IN AN ORGANIZED HEALTH CARE EDUCATION/TRAINING PROGRAM

## 2024-02-07 PROCEDURE — 110454 HCHG SHELL REV 250: Performed by: STUDENT IN AN ORGANIZED HEALTH CARE EDUCATION/TRAINING PROGRAM

## 2024-02-07 PROCEDURE — 57260 CMBN ANT PST COLPRHY: CPT | Performed by: STUDENT IN AN ORGANIZED HEALTH CARE EDUCATION/TRAINING PROGRAM

## 2024-02-07 DEVICE — SYSTEM TRANSVAGINAL MID URETHRAL SLING ADVANTAGE FIT BLUE (1EA): Type: IMPLANTABLE DEVICE | Site: URETHRA | Status: FUNCTIONAL

## 2024-02-07 DEVICE — MESH RESTORELLE Y 24 X 4 CM: Type: IMPLANTABLE DEVICE | Site: PELVIS | Status: FUNCTIONAL

## 2024-02-07 RX ORDER — HYDROMORPHONE HYDROCHLORIDE 1 MG/ML
0.1 INJECTION, SOLUTION INTRAMUSCULAR; INTRAVENOUS; SUBCUTANEOUS
Status: DISCONTINUED | OUTPATIENT
Start: 2024-02-07 | End: 2024-02-07 | Stop reason: HOSPADM

## 2024-02-07 RX ORDER — LABETALOL HYDROCHLORIDE 5 MG/ML
5 INJECTION, SOLUTION INTRAVENOUS
Status: DISCONTINUED | OUTPATIENT
Start: 2024-02-07 | End: 2024-02-07 | Stop reason: HOSPADM

## 2024-02-07 RX ORDER — CEFAZOLIN SODIUM 1 G/3ML
INJECTION, POWDER, FOR SOLUTION INTRAMUSCULAR; INTRAVENOUS PRN
Status: DISCONTINUED | OUTPATIENT
Start: 2024-02-07 | End: 2024-02-07 | Stop reason: SURG

## 2024-02-07 RX ORDER — VANCOMYCIN HYDROCHLORIDE 500 MG/10ML
INJECTION, POWDER, LYOPHILIZED, FOR SOLUTION INTRAVENOUS
Status: COMPLETED | OUTPATIENT
Start: 2024-02-07 | End: 2024-02-07

## 2024-02-07 RX ORDER — ONDANSETRON 2 MG/ML
4 INJECTION INTRAMUSCULAR; INTRAVENOUS
Status: COMPLETED | OUTPATIENT
Start: 2024-02-07 | End: 2024-02-07

## 2024-02-07 RX ORDER — EPHEDRINE SULFATE 50 MG/ML
INJECTION, SOLUTION INTRAVENOUS PRN
Status: DISCONTINUED | OUTPATIENT
Start: 2024-02-07 | End: 2024-02-07 | Stop reason: SURG

## 2024-02-07 RX ORDER — SCOLOPAMINE TRANSDERMAL SYSTEM 1 MG/1
1 PATCH, EXTENDED RELEASE TRANSDERMAL
Status: DISCONTINUED | OUTPATIENT
Start: 2024-02-07 | End: 2024-02-07 | Stop reason: HOSPADM

## 2024-02-07 RX ORDER — ROCURONIUM BROMIDE 10 MG/ML
INJECTION, SOLUTION INTRAVENOUS PRN
Status: DISCONTINUED | OUTPATIENT
Start: 2024-02-07 | End: 2024-02-07 | Stop reason: SURG

## 2024-02-07 RX ORDER — ONDANSETRON 2 MG/ML
INJECTION INTRAMUSCULAR; INTRAVENOUS PRN
Status: DISCONTINUED | OUTPATIENT
Start: 2024-02-07 | End: 2024-02-07 | Stop reason: SURG

## 2024-02-07 RX ORDER — METOPROLOL TARTRATE 1 MG/ML
1 INJECTION, SOLUTION INTRAVENOUS
Status: DISCONTINUED | OUTPATIENT
Start: 2024-02-07 | End: 2024-02-07 | Stop reason: HOSPADM

## 2024-02-07 RX ORDER — MIDAZOLAM HYDROCHLORIDE 1 MG/ML
INJECTION INTRAMUSCULAR; INTRAVENOUS PRN
Status: DISCONTINUED | OUTPATIENT
Start: 2024-02-07 | End: 2024-02-07 | Stop reason: SURG

## 2024-02-07 RX ORDER — MEPERIDINE HYDROCHLORIDE 25 MG/ML
6.25 INJECTION INTRAMUSCULAR; INTRAVENOUS; SUBCUTANEOUS
Status: DISCONTINUED | OUTPATIENT
Start: 2024-02-07 | End: 2024-02-07 | Stop reason: HOSPADM

## 2024-02-07 RX ORDER — SODIUM CHLORIDE, SODIUM LACTATE, POTASSIUM CHLORIDE, CALCIUM CHLORIDE 600; 310; 30; 20 MG/100ML; MG/100ML; MG/100ML; MG/100ML
INJECTION, SOLUTION INTRAVENOUS CONTINUOUS
Status: DISCONTINUED | OUTPATIENT
Start: 2024-02-07 | End: 2024-02-07 | Stop reason: HOSPADM

## 2024-02-07 RX ORDER — OXYCODONE HCL 5 MG/5 ML
10 SOLUTION, ORAL ORAL
Status: COMPLETED | OUTPATIENT
Start: 2024-02-07 | End: 2024-02-07

## 2024-02-07 RX ORDER — HYDROMORPHONE HYDROCHLORIDE 1 MG/ML
0.2 INJECTION, SOLUTION INTRAMUSCULAR; INTRAVENOUS; SUBCUTANEOUS
Status: DISCONTINUED | OUTPATIENT
Start: 2024-02-07 | End: 2024-02-07 | Stop reason: HOSPADM

## 2024-02-07 RX ORDER — METOCLOPRAMIDE HYDROCHLORIDE 5 MG/ML
INJECTION INTRAMUSCULAR; INTRAVENOUS PRN
Status: DISCONTINUED | OUTPATIENT
Start: 2024-02-07 | End: 2024-02-07 | Stop reason: SURG

## 2024-02-07 RX ORDER — ACETAMINOPHEN 500 MG
1000 TABLET ORAL ONCE
Status: COMPLETED | OUTPATIENT
Start: 2024-02-07 | End: 2024-02-07

## 2024-02-07 RX ORDER — LIDOCAINE HYDROCHLORIDE 40 MG/ML
SOLUTION TOPICAL PRN
Status: DISCONTINUED | OUTPATIENT
Start: 2024-02-07 | End: 2024-02-07 | Stop reason: SURG

## 2024-02-07 RX ORDER — ACETAMINOPHEN 500 MG
1000 TABLET ORAL 3 TIMES DAILY PRN
COMMUNITY

## 2024-02-07 RX ORDER — HALOPERIDOL 5 MG/ML
1 INJECTION INTRAMUSCULAR
Status: DISCONTINUED | OUTPATIENT
Start: 2024-02-07 | End: 2024-02-07 | Stop reason: HOSPADM

## 2024-02-07 RX ORDER — GENTAMICIN SULFATE 40 MG/ML
INJECTION, SOLUTION INTRAMUSCULAR; INTRAVENOUS
Status: DISCONTINUED | OUTPATIENT
Start: 2024-02-07 | End: 2024-02-07 | Stop reason: HOSPADM

## 2024-02-07 RX ORDER — HYDRALAZINE HYDROCHLORIDE 20 MG/ML
5 INJECTION INTRAMUSCULAR; INTRAVENOUS
Status: DISCONTINUED | OUTPATIENT
Start: 2024-02-07 | End: 2024-02-07 | Stop reason: HOSPADM

## 2024-02-07 RX ORDER — HYDROMORPHONE HYDROCHLORIDE 1 MG/ML
0.4 INJECTION, SOLUTION INTRAMUSCULAR; INTRAVENOUS; SUBCUTANEOUS
Status: DISCONTINUED | OUTPATIENT
Start: 2024-02-07 | End: 2024-02-07 | Stop reason: HOSPADM

## 2024-02-07 RX ORDER — HEPARIN SODIUM 5000 [USP'U]/ML
5000 INJECTION, SOLUTION INTRAVENOUS; SUBCUTANEOUS ONCE
Status: COMPLETED | OUTPATIENT
Start: 2024-02-07 | End: 2024-02-07

## 2024-02-07 RX ORDER — OXYCODONE HYDROCHLORIDE 5 MG/1
5 TABLET ORAL EVERY 8 HOURS PRN
Qty: 12 TABLET | Refills: 0 | Status: SHIPPED | OUTPATIENT
Start: 2024-02-07 | End: 2024-02-11

## 2024-02-07 RX ORDER — ONDANSETRON 4 MG/1
4 TABLET, ORALLY DISINTEGRATING ORAL EVERY 4 HOURS PRN
Qty: 20 TABLET | Refills: 0 | Status: SHIPPED | OUTPATIENT
Start: 2024-02-07

## 2024-02-07 RX ORDER — PHENAZOPYRIDINE HYDROCHLORIDE 200 MG/1
200 TABLET, FILM COATED ORAL ONCE
Status: COMPLETED | OUTPATIENT
Start: 2024-02-07 | End: 2024-02-07

## 2024-02-07 RX ORDER — DEXAMETHASONE SODIUM PHOSPHATE 4 MG/ML
INJECTION, SOLUTION INTRA-ARTICULAR; INTRALESIONAL; INTRAMUSCULAR; INTRAVENOUS; SOFT TISSUE PRN
Status: DISCONTINUED | OUTPATIENT
Start: 2024-02-07 | End: 2024-02-07 | Stop reason: SURG

## 2024-02-07 RX ORDER — OXYCODONE HCL 5 MG/5 ML
5 SOLUTION, ORAL ORAL
Status: COMPLETED | OUTPATIENT
Start: 2024-02-07 | End: 2024-02-07

## 2024-02-07 RX ORDER — BUPIVACAINE HYDROCHLORIDE 2.5 MG/ML
INJECTION, SOLUTION EPIDURAL; INFILTRATION; INTRACAUDAL
Status: DISCONTINUED | OUTPATIENT
Start: 2024-02-07 | End: 2024-02-07 | Stop reason: HOSPADM

## 2024-02-07 RX ORDER — SODIUM CHLORIDE, SODIUM LACTATE, POTASSIUM CHLORIDE, CALCIUM CHLORIDE 600; 310; 30; 20 MG/100ML; MG/100ML; MG/100ML; MG/100ML
INJECTION, SOLUTION INTRAVENOUS CONTINUOUS
Status: ACTIVE | OUTPATIENT
Start: 2024-02-07 | End: 2024-02-07

## 2024-02-07 RX ORDER — SCOLOPAMINE TRANSDERMAL SYSTEM 1 MG/1
1 PATCH, EXTENDED RELEASE TRANSDERMAL
COMMUNITY

## 2024-02-07 RX ORDER — DIPHENHYDRAMINE HYDROCHLORIDE 50 MG/ML
12.5 INJECTION INTRAMUSCULAR; INTRAVENOUS
Status: DISCONTINUED | OUTPATIENT
Start: 2024-02-07 | End: 2024-02-07 | Stop reason: HOSPADM

## 2024-02-07 RX ADMIN — SUGAMMADEX 100 MG: 100 INJECTION, SOLUTION INTRAVENOUS at 14:05

## 2024-02-07 RX ADMIN — METOCLOPRAMIDE 10 MG: 5 INJECTION, SOLUTION INTRAMUSCULAR; INTRAVENOUS at 10:49

## 2024-02-07 RX ADMIN — ROCURONIUM BROMIDE 50 MG: 50 INJECTION, SOLUTION INTRAVENOUS at 10:41

## 2024-02-07 RX ADMIN — LIDOCAINE HYDROCHLORIDE 3 ML: 40 SOLUTION TOPICAL at 10:43

## 2024-02-07 RX ADMIN — EPHEDRINE SULFATE 5 MG: 50 INJECTION, SOLUTION INTRAVENOUS at 10:49

## 2024-02-07 RX ADMIN — OXYCODONE HYDROCHLORIDE 10 MG: 5 SOLUTION ORAL at 14:48

## 2024-02-07 RX ADMIN — FENTANYL CITRATE 50 MCG: 50 INJECTION, SOLUTION INTRAMUSCULAR; INTRAVENOUS at 11:53

## 2024-02-07 RX ADMIN — PROPOFOL 25 MCG/KG/MIN: 10 INJECTION, EMULSION INTRAVENOUS at 10:55

## 2024-02-07 RX ADMIN — PROPOFOL 100 MG: 10 INJECTION, EMULSION INTRAVENOUS at 10:40

## 2024-02-07 RX ADMIN — SODIUM CHLORIDE, POTASSIUM CHLORIDE, SODIUM LACTATE AND CALCIUM CHLORIDE: 600; 310; 30; 20 INJECTION, SOLUTION INTRAVENOUS at 09:35

## 2024-02-07 RX ADMIN — HEPARIN SODIUM 5000 UNITS: 5000 INJECTION, SOLUTION INTRAVENOUS; SUBCUTANEOUS at 09:36

## 2024-02-07 RX ADMIN — SCOPOLAMINE 1 PATCH: 1.5 PATCH, EXTENDED RELEASE TRANSDERMAL at 10:26

## 2024-02-07 RX ADMIN — EPHEDRINE SULFATE 10 MG: 50 INJECTION, SOLUTION INTRAVENOUS at 11:01

## 2024-02-07 RX ADMIN — FENTANYL CITRATE 50 MCG: 50 INJECTION, SOLUTION INTRAMUSCULAR; INTRAVENOUS at 10:37

## 2024-02-07 RX ADMIN — ROCURONIUM BROMIDE 10 MG: 50 INJECTION, SOLUTION INTRAVENOUS at 12:49

## 2024-02-07 RX ADMIN — ACETAMINOPHEN 1000 MG: 500 TABLET ORAL at 09:35

## 2024-02-07 RX ADMIN — ONDANSETRON 4 MG: 2 INJECTION INTRAMUSCULAR; INTRAVENOUS at 14:38

## 2024-02-07 RX ADMIN — CEFAZOLIN 2 G: 1 INJECTION, POWDER, FOR SOLUTION INTRAMUSCULAR; INTRAVENOUS at 10:40

## 2024-02-07 RX ADMIN — EPHEDRINE SULFATE 5 MG: 50 INJECTION, SOLUTION INTRAVENOUS at 13:27

## 2024-02-07 RX ADMIN — FENTANYL CITRATE 50 MCG: 50 INJECTION, SOLUTION INTRAMUSCULAR; INTRAVENOUS at 11:24

## 2024-02-07 RX ADMIN — MIDAZOLAM HYDROCHLORIDE 2 MG: 1 INJECTION, SOLUTION INTRAMUSCULAR; INTRAVENOUS at 10:35

## 2024-02-07 RX ADMIN — EPHEDRINE SULFATE 5 MG: 50 INJECTION, SOLUTION INTRAVENOUS at 10:52

## 2024-02-07 RX ADMIN — ONDANSETRON 4 MG: 2 INJECTION INTRAMUSCULAR; INTRAVENOUS at 13:41

## 2024-02-07 RX ADMIN — DEXAMETHASONE SODIUM PHOSPHATE 8 MG: 4 INJECTION INTRA-ARTICULAR; INTRALESIONAL; INTRAMUSCULAR; INTRAVENOUS; SOFT TISSUE at 10:46

## 2024-02-07 RX ADMIN — FENTANYL CITRATE 50 MCG: 50 INJECTION, SOLUTION INTRAMUSCULAR; INTRAVENOUS at 14:07

## 2024-02-07 RX ADMIN — PHENAZOPYRIDINE HYDROCHLORIDE 200 MG: 200 TABLET ORAL at 09:35

## 2024-02-07 ASSESSMENT — PAIN DESCRIPTION - PAIN TYPE
TYPE: SURGICAL PAIN

## 2024-02-07 ASSESSMENT — FIBROSIS 4 INDEX: FIB4 SCORE: 1.68

## 2024-02-07 NOTE — ANESTHESIA PREPROCEDURE EVALUATION
Case: [id number] Date/Time: 02/07/24 1030    Procedures:       ROBOTIC ASSISTED LAPAROSCOPIC SUPRACERVICAL HYSTERECTOMY, BILATERAL SALPINGO-OOPHORECTOMY, SACROCOLPOPEXY, POSSIBLE POSTERIOR REPAIR/PERINEORRHAPHY, POSSIBLE MID-URETHRAL SLING, CYSTOURETHROSCOPY      SACROCOLPOPEXY, ROBOT-ASSISTED, USING DA EMERALD XI      COLPORRHAPHY, POSTERIOR      BLADDER SLING, FEMALE    Pre-op diagnosis: INCOMPLETE UTEROVAGINAL PROLAPSE, CYSTOCELE LATERAL, RECTOCELE, STRESS URINARY INCONTINENCE    Location: [facility] OR 15 / SURGERY [facility]    Surgeons: Antoni Patterson M.D.          72 yo F with history of HTNm pAfib on AC (xarelto last taken 2/3/24), chronic opioid user and GERD. Abnormal preop EKG, subsequently seen by cardiology.  TTE shows normal function and wall motion; deemed moderate risk.     NPO  Relevant Problems   ANESTHESIA   (positive) PONV (postoperative nausea and vomiting)      CARDIAC   (positive) PAC (premature atrial contraction)   (positive) PAF (paroxysmal atrial fibrillation) (HCC)   (positive) PSVT (paroxysmal supraventricular tachycardia) (HCC)      GI   (positive) GERD (gastroesophageal reflux disease)      ENDO   (positive) Postsurgical hypothyroidism       Physical Exam    Airway   Mallampati: II  TM distance: >3 FB  Neck ROM: full       Cardiovascular - normal exam  Rhythm: regular  Rate: normal  (-) murmur     Dental - normal exam           Pulmonary - normal exam  Breath sounds clear to auscultation     Abdominal    Neurological - normal exam                   Anesthesia Plan    ASA 3   ASA physical status 3 criteria: a thrombophilic disease requiring anticoagulation    Plan - general       Airway plan will be ETT          Induction: intravenous    Postoperative Plan: Postoperative administration of opioids is intended.    Pertinent diagnostic labs and testing reviewed    Informed Consent:    Anesthetic plan and risks discussed with patient and spouse.    Use of blood products discussed with: patient and  spouse whom consented to blood products.

## 2024-02-07 NOTE — ANESTHESIA PROCEDURE NOTES
Airway    Date/Time: 2/7/2024 10:43 AM    Performed by: Divine Birmingham M.D.  Authorized by: Divine Birmingham M.D.    Location:  OR  Urgency:  Elective  Difficult Airway: No    Indications for Airway Management:  Anesthesia      Spontaneous Ventilation: absent    Sedation Level:  Deep  Preoxygenated: Yes    Patient Position:  Sniffing  Mask Difficulty Assessment:  2 - vent by mask + OA or adjuvant +/- NMBA  Final Airway Type:  Endotracheal airway  Final Endotracheal Airway:  ETT  Cuffed: Yes    Technique Used for Successful ETT Placement:  Direct laryngoscopy  Devices/Methods Used in Placement:  Intubating stylet and anterior pressure/BURP    Insertion Site:  Oral  Blade Type:  Nisha  Laryngoscope Blade/Videolaryngoscope Blade Size:  3  ETT Size (mm):  6.5  Measured from:  Teeth  ETT to Teeth (cm):  22  Placement Verified by: auscultation and capnometry    Cormack-Lehane Classification:  Grade IIb - view of arytenoids or posterior of glottis only  Number of Attempts at Approach:  1

## 2024-02-07 NOTE — DISCHARGE INSTRUCTIONS
HOME CARE INSTRUCTIONS    ACTIVITY: Rest and take it easy for the first 24 hours.  A responsible adult is recommended to remain with you during that time.  It is normal to feel sleepy.  We encourage you to not do anything that requires balance, judgment or coordination.    FOR 24 HOURS DO NOT:  Drive, operate machinery or run household appliances.  Drink beer or alcoholic beverages.  Make important decisions or sign legal documents.    SPECIAL INSTRUCTIONS: Post-op: What to expect after your surgery    For urgent post-operative questions or concerns, please call Dr. Patterson's direct on-call cell line at [phone].     For less-urgent matters (Monday - Friday), you may send a message through [employer] or call the general Women's Health line at [phone] [phone].     You may resume Xarelto on Thursday, 2/8/24, in the evening.     Bladder function  Try to empty your bladder (urinate) at regular intervals by sitting on the toilet and relaxing.  You may need to adjust your positioning (lean forward or back) to empty the bladder fully. It is important that you do not push or strain to empty your bladder.     Call the surgeon at the number above if you cannot urinate. Also, call for treatment if you have signs and symptoms of a urinary tract infection, including:   Burning with urination  Bladder pain  Worsening need to urinate right away  Urine with a bad smell    If you are sent home with a catheter:   Empty the catheter bag when it becomes full. The bag should be kept at a level below your hips to drain properly. When you are asleep, the bag should dangle off the bed. and should dangle off of the bed while you are asleep. You will be called the day after you go home to schedule an office visit to test your bladder and remove the catheter.     Vaginal care:   Do not go swimming, take sitting baths, or have sexual intercourse for 6 weeks after surgery. Do not place anything in the vagina except vaginal estrogen cream, if  instructed to do so by your surgeon.     Vaginal discharge and bleeding/spotting is also normal through the entire 6-week recovery. Sometimes small sutures will fall out of the vagina as they dissolve. This is normal. Contact the office with any heavy bleeding soaking through pads, bad-smelling discharge, or worsening pain.     Pain management:  Surgical pain is controlled in most patients with only non-steroidal anti-inflammatory drugs (NSAIDs, such as ibuprofen, “Advil”), and acetaminophen (Tylenol). These drugs can be taken together without interaction.     In the hospital, your nurse will give you these medications at regular intervals to both treat and to prevent pain. If these are not controlling your pain, you may ask the nurse for additional medication.     When you go home, you will also take NSAIDs and acetaminophen for pain management. I recommend the following at-home pain regimen:    Take tylenol 1000mg three times per day (every 8 hrs), for the first 5-7 days after surgery to prevent and treat pain and inflammation.   You may also take oxycodone 5 mg three times a day (every 8 hrs) as needed for acute post operative pain not controlled with tylenol alone.   You may take Zofran 4 mg every 4 hrs as needed for post operative nausea.   After 5-7 days, you may take 500mg tylenol as-needed     Sometimes, a short course of narcotics such as oxycodone and hydromorphone is  required, but this is not routine. We do not recommend using narcotics regularly as it can lead to constipation or dizziness, and falls.     Do not drive or operate heavy machinery while using narcotics. You are unlikely to become addicted if you need to take a narcotic medication a few times within the first week of your surgery.  After the first few days to one week, your pain should decrease and you should not have pain severe enough to need narcotics. If you continue having severe pain, contact your surgeon for re-evaluation.      Abdominal wound care  The incisions on your abdomen will be closed with either small bandages or a surgical glue. There are also tiny dissolving sutures beneath the skin. You can shower with these in place. In the shower, let the soapy water run over your incisions. Do not scrub or wipe your incisions. Keep the incisions dry for the remainder of the day/night. The bandages will fall off on their own, or you can remove them after at least 3 days if they become discolored or dirty. The glue will also fall off on its own after a few weeks. Small sutures that pop out through the skin are normal and will dissolve over time.    Call us if you feel increasing pain, redness, discharge or warmth at the incision.     Bowel function  Constipation is common after surgery. This means it may take several days before having a normal bowel movement. It is important to take extra steps to keep your stools soft to avoid straining with bowel movements. Straining may damage the prolapse repair before it has healed. Most patients will be given a prescription for a stool softener (docusate) as well as a gentle laxative (Miralax or lactulose). These mediations adds water to the stool to make it easier to pass. Take them daily throughout your recovery. Hold for a day if you develop diarrhea.     Call us if you experience any repeated episodes of vomiting, worsening abdominal pain/bloating, or are unable to have a bowel movement for more than 3 days.     Activity restrictions  During the first 6 weeks avoid any type of heavy lifting that requires you to strain.  Gentle walking is good exercise. Start with about 10 minutes a day when you feel ready and build up gradually. Avoid repetitive squatting or bending at the waist.Avoid any fitness-type training, aerobics, etc. for at least 6 weeks after surgery. Generally, you will need 4-6 weeks off work. This period may be longer if you have a very physical job.    Return to sex  When your  surgeon clears you to resume sex (if desired), begin slowly and to use enough lubrication to help ease the discomfort. Because your vagina and pelvis have been re-structured, and it can take time to get used to sex after surgery. As scar tissue softens over time you will feel less discomfort. If discomfort does not go away, contact the office to schedule an exam and to discuss other options. In some cases, your surgeon may prescribe a low dose of vaginal estrogen to help with vaginal dryness and pain that may happen with sex.     MEDICATIONS: Resume taking daily medication.  Take prescribed pain medication with food.  If no medication is prescribed, you may take non-aspirin pain medication if needed.  PAIN MEDICATION CAN BE VERY CONSTIPATING.  Take a stool softener or laxative such as senokot, pericolace, or milk of magnesia if needed.    Prescription given for oxycodone, zofran.  Last pain medication given at 9:30am (Tylenol), 3pm (oxycodone).    A follow-up appointment should be arranged with your doctor; call to schedule.    You should CALL YOUR PHYSICIAN if you develop:  Fever greater than 101 degrees F.  Pain not relieved by medication, or persistent nausea or vomiting.  Excessive bleeding (blood soaking through dressing) or unexpected drainage from the wound.  Extreme redness or swelling around the incision site, drainage of pus or foul smelling drainage.  Inability to urinate or empty your bladder within 8 hours.  Problems with breathing or chest pain.    You should call 911 if you develop problems with breathing or chest pain.  If you are unable to contact your doctor or surgical center, you should go to the nearest emergency room or urgent care center.  Physician's telephone #: [phone]    MILD FLU-LIKE SYMPTOMS ARE NORMAL.  YOU MAY EXPERIENCE GENERALIZED MUSCLE ACHES, THROAT IRRITATION, HEADACHE AND/OR SOME NAUSEA.    If any questions arise, call your doctor.  If your doctor is not available, please  feel free to call the Surgical Center at [phone].  The Center is open Monday through Friday from 7AM to 7PM.      A registered nurse may call you a few days after your surgery to see how you are doing after your procedure.    You may also receive a survey in the mail within the next two weeks and we ask that you take a few moments to complete the survey and return it to us.  Our goal is to provide you with very good care and we value your comments.     Depression / Suicide Risk    As you are discharged from this [facility] facility, it is important to learn how to keep safe from harming yourself.    Recognize the warning signs:  Abrupt changes in personality, positive or negative- including increase in energy   Giving away possessions  Change in eating patterns- significant weight changes-  positive or negative  Change in sleeping patterns- unable to sleep or sleeping all the time   Unwillingness or inability to communicate  Depression  Unusual sadness, discouragement and loneliness  Talk of wanting to die  Neglect of personal appearance   Rebelliousness- reckless behavior  Withdrawal from people/activities they love  Confusion- inability to concentrate     If you or a loved one observes any of these behaviors or has concerns about self-harm, here's what you can do:  Talk about it- your feelings and reasons for harming yourself  Remove any means that you might use to hurt yourself (examples: pills, rope, extension cords, firearm)  Get professional help from the community (Mental Health, Substance Abuse, psychological counseling)  Do not be alone:Call your Safe Contact- someone whom you trust who will be there for you.  Call your local CRISIS HOTLINE 835-8990 or [phone]  Call your local Children's Mobile Crisis Response Team [place] (009) 494-0882 or www.[employer]  Call the toll free National Suicide Prevention Hotlines   National Suicide Prevention Lifeline 481-082-UCZW (8066) [facility]  [facility] 800-SUICIDE (636-4887)    I acknowledge receipt and understanding of these Home Care instructions.

## 2024-02-07 NOTE — OP REPORT
OPERATIVE REPORT     Name: Argelia Jimenez    : 1950    MRN: [id number]       PRE-OP DIAGNOSIS:   Incomplete uterovaginal prolapse  Cystocele  Rectocele  Stress urinary incontinence            POST-OP DIAGNOSIS:   Incomplete uterovaginal prolapse  Cystocele  Rectocele  Stress urinary incontinence              PROCEDURE:   Robotic assisted laparoscopic supracervical hysterectomy, bilateral salpingo-oophorectomy (21762)  Robotic sacrocolpopexy (33084)  Anterior and posterior repair, perineorrhaphy (88118)  Retropubic midurethral sling (50556)              SURGEON:   Surgeon(s) and Role:     * Antoni Patterson M.D. - Primary     * SOSA Cohen - Assist            ANESTHESIA: General            FINDINGS:       - Exam under anesthesia: Stage 2 prolapse, uterine descent to 1cm proximal to introitus on tension, palpable rectocele and attenuated perineal body. At the completion of the procedure there was excellent tricompartmental support, no sutures were palpated rectally.    - Laparoscopy: No entry injuries to viscera or vasculature. Normal appearing peritoneal cavity and liver edge with anterior omental adhesion, small peritoneal cavity. Normal-appearing bilateral fallopian tubes and ovaries. Ureters visualized bilaterally through the procedure. Normal survey prior to closure   - Cystourethroscopy: Normal appearing urothelium without lesions, masses, sutures, or perforations. Ureteral orifices noted in the normal orthotopic position, with brisk jets of urine bilaterally after all procedures were completed. Urethra was normal in appearance without evidence of stricture, perforation, or diverticulum.        ESTIMATED BLOOD LOSS: 50 mL            DRAINS: Rodríguez                   SPECIMENS:   ID Type Source Tests Collected by Time Destination   A : UTERUS, BILATERAL FALLOPIAN TUBES AND OVARIES Tissue Uterus PATHOLOGY SPECIMEN Antoni Patterson M.D. 2024 12:57 PM                IMPLANTS:   Implant Name Type Inv.  Item Serial No.  Lot No. LRB No. Used Action   SYSTEM TRANSVAGINAL MID URETHRAL SLING ADVANTAGE FIT BLUE (1EA) Bladder Sling SYSTEM TRANSVAGINAL MID URETHRAL SLING ADVANTAGE FIT BLUE (1EA)  [employer] [id number]  1 Implanted   MESH RESTORELLE Y 24 X 4 CM Mesh MESH RESTORELLE Y 24 X 4 CM  COLOP[employer] ROBERT [id number]  1 Implanted               COMPLICATIONS: None            DISPOSITION: Discharge            CONDITION: Stable            INDICATION FOR SURGERY:     Argelia Jimenez is a 73 y.o. year old P2 with symptomatic stage II uterovaginal prolapse. At her prior visits we reviewed all treatment options for prolapse including conservative/observation, pessary, and both vaginal and laparoscopic approaches as well as native tissue and mesh augmented approaches. After detailed counseling about all prolapse treatment options and shared decision-making, she opts for a mesh augmented reconstructive approach to prolapse repair via  robotic-assisted laparoscopic supracervical hysterectomy, bilateral salpingo-oophorectomy, sacrocolpopexy, possible posterior repair perineorrhaphy, retropubic midurethral sling and all indicated procedures.  She has reviewed all preoperative written materials and expressed understanding about the procedure, risks, benefits, and recovery. Pre-operative urodynamics did demonstrate stress urinary incontinence with reduction of prolapse and she was counseled on observation versus a concomitant sling, and she opts for sling procedure to treat her leakage. Benefits of surgery were reviewed, including functional outcomes (bladder/bowel/sexual). Risks of surgery were  also discussed including anesthesia, bleeding, infection, damage to surrounding organs (bladder, ureter, urethra, bowel, blood vessel, nerves), possible blood transfusion, recurrent prolapse, transient buttock pain if sacrospinous suspension performed, mesh exposure/erosion, transient voiding dysfunction requiring  "catheterization, new/worsening urinary incontinence, pain with sex.  She understands that stopping her blood thinner does create risk while holding for cardiovascular/thromboembolic events even if cleared by cardiology.  She also is at higher risk for postoperative bleeding complications when she restarts her anticoagulation. In regards to cardiology eval for surgery: Her preop EKG was somewhat abnormal, she was evaluated urgently by her cardiologist Dr. Esparza, who performed an echo.  He noted that \"the echocardiogram shows normal function and wall motion, no concern about mild hypertrophy.  Unless she is having new heart related symptoms she is cleared for her hysterectomy.  Moderate risk otherwise. She can stop anticoagulation 72-96 hrs prior to surgery and to be restarted when you feel postoperative bleeding risk is acceptable\". She understands these risks, all questions were answered and consent was signed and witnessed.        TECHNIQUE:    I spoke with the patient in the preoperative holding area, where again risks, benefits, indications, and alternatives were reviewed and informed consent was obtained.  She was then transferred to the operative suite, where she was identified, procedure was confirmed.  She was placed in dorsal supine position, given general endotracheal anesthesia without difficulty.  She was then placed in a dorsal lithotomy position  in yellowfin stirrups with all pressure points padded.  She was prepared and draped in usual sterile fashion with arms tucked and all pressure points padded.  An appropriate time-out was performed, where patient was identified, procedure was confirmed, and the operative team was introduced.  It was also confirmed that patient did receive cefazolin 2 g IV for prophylaxis, and she also received DVT prophylaxis with 5000 units heparin and sequential compression devices bilaterally throughout the case.  At this time, exam under anesthesia revealed findings " noted above.   A 16-Chinese Rodríguez catheter was then placed in the patient's  bladder for drainage throughout the procedure.  The anterior lip of the cervix was visualized with a speculum and grasped with a single-toothed tenaculum. The uterus was sounded to a depth of 8 cm, and a medium V-care uterine manipulator was placed easily, and the balloon inflated. The handle was covered with a sterile towel.     At this time, attention was turned to the patient's abdomen. After infiltration with marcaine, a 12mm supraumbilical incision was made, and the subcutaneous tissue dissected to the level of the fascia, which was grasped with kocher clamps, elevated, and entered sharply using heavy Varma scissors. The peritoneum was then grasped with hemostats and entered sharply with Metzenbaum scissors. Digital evaluation noted a correct entry into the peritoneal cavity without any anterior abdominal wall adhesions noted. With an S-retractor delineating the entry path, the 12mm Taryn balloon trocar was then advanced into the peritoneal cavity, which was insufflated to a pressure of 15mmHg. An 8mm robotic trocar was then placed through the Taryn trocar in preparation for docking. The camera was inserted and a full survey was performed with the above findings noted. No entry injuries to bowl, omentum, mesentery or vasculature were visualized.  At this time, on the patient's right lateral side, approximately 8 cm lateral to the umbilicus, an 8 mm incision that was made after injection with marcaine, and an 8 mm robotic trocar was then advanced under direct visualization of the laparoscope without difficulty.  This was repeated with an 8mm robotic trocar approximately 16cm right lateral to the umbilicus. On the patient's left hand side, two additional robotic ports were placed at 8cm and 16cm lateral to the umbilicus after infiltration of Marcaine, under direct visualization with the laparoscope. Excellent hemostasis was noted at all  port sites. At this time, the patient was placed in steep Trendelenburg. The [employer] Xi robot was then brought in for docking, and instruments were placed under direct visualization.     We then proceeded with the robotic assisted laparoscopic supracervical hysterectomy and bilateral salpingo-oophorectomy in the following fashion: The right adnexa was visualized and the infundibulopelvic ligament was found to be remote from the peristalsing right ureter at the pelvic brim. The right IP ligament was then ligated with bipolar electrocautery and transected with the monopolar scissors with excellent hemostasis noted. Dissection was then carried down along the broad ligament to the round ligament which was cauterized and transected. The round ligament on medial traction, the posterior leaflet of the broad ligament was then dissected further down to the level of the internal cervical os, lateralizing the ureter away from the cervix. Attention was then turned anteriorly where the vesicouterine peritoneum was dissected away from the uterus to create a bladder flap, to the level below the uterine manipulator cup. Uterine vessels were then carefully skeletonized and cauterized and transected perpendicularly at the level of the internal cervical os with excellent hemostasis noted. Attention was then turned to the contralateral side of the uterus where an identical dissection was performed starting with the infundibulopelvic ligament, through the round ligament creating a bladder flap and finally cauterizing and transecting the uterine vessels. The cervix was then transected perpendicularly at the level of the internal os using monopolar electrocautery with excellent hemostasis noted. The uterus tubes and ovaries were then grasped and moved into the upper pelvis for future retrieval at the end of the case. All pedicles were then inspected and found to be hemostatic at this time.    Attention was then turned to the robotic  assisted laparoscopic sacrocervicopexy  which proceed in the following fashion: The sigmoid colon was retracted medially with the third arm and the sacral promontory was visualized. The peritoneum overlying the sacral promontory was then lifted and entered sharply with cold scissors. Gentle blunt and sharp dissection was then carried down through presacral adipose tissue to expose the anterior longitudinal ligament of the sacrum. Minimal electrocautery was used and excellent hemostasis was noted. Two interrupted GoreTex sutures were then placed superficially through the anterior longitudinal ligament of the sacrum just inferior to the L5 disc. These were then tucked out of the way for future use in mesh attachment.  A retroperitoneal tunnel for future covering of the mesh was then created using the monopolar scissors traveling in the right pararectal space and exiting at a position just medial to the proximal right uterosacral ligament. Attention was then turned to the anterior dissection. The cervix was grasped with a robotic tenaculum and placed under tension and the bladder was further dissected away from the vagina through the avascular vesicovaginal space down to the level of the trigone, which is identified with gentle traction on the Rodríguez balloon. Attention was then turned posteriorly where again traction was placed on the cervix using the tenaculum, and the peritoneum was entered midway down the vagina entering the avascular retrorectal space and dissected down to the level of the midvagina with excellent hemostasis noted.  Dissection is not carried down operative perineal body in order to limit surgical time and suturing time to the patient's cardiovascular risks.  The Restorelle Y-mesh, which had been previously trimmed to the patient's measurements, was then brought into the operative field, and affixed to the cervical stroma using 3 Bastian-Yovani sutures anteriorly and 3 Bastian-Yovani sutures posteriorly.  Attention was then turned posteriorly where the remainder of the posterior leaf of the mesh was attached to the posterior vaginal muscularis using interrupted 2-0 Vicryl sutures with excellent approximation of the mesh to the posterior vaginal tissue without any bunching. Attention was then turned to the anterior leaflet which is also attached to the anterior vaginal muscularis using interrupted 2-0 Vicryl sutures  with excellent approximation of the mesh to the anterior vaginal tissue without any bunching. The Y portion of the mesh was then brought through the previously created retroperitoneal tunnel to the level of the sacrum.  The mesh was then tensioned robotically and I personally examined for correct tensioning with a vaginal examination.  Ideal tensioning had the cervix almost approximated against the sacrum, so tension was decreased slightly in order to allow better healing. The Y-portion of the mesh was then affixed to the sacral promontory the previously placed GoreTex sutures. Excess mesh was then trimmed and removed from the operative field. Excellent hemostasis was noted at the sacrum. The peritoneum overlying the sacrum was then closed using a 2-0 monocryl stratafix suture.  Due to the patient receiving heparin, small amount of Surgiflo hemostatic agent was placed into the dissection beds before peritoneal closure.  The remaining peritoneum was then reapproximated in anterior to posterior fashion using a 2-0 Monocryl stratafix suture, completely covering and retroperitonealized the mesh without visible defects that would allow for viscera to herniate.     Cystourethroscopy was then performed. The Rodríguez catheter was removed and a 70 degree cystoscope was placed into the bladder under direct visualization and the bladder was backfilled with sterile water. A 360 degree survey of the bladder was then performed with the above findings noted, no sutures, mesh or urothelial defects visualized, and excellent  bilateral ureteral efflux with pyridium-stained urine. The bladder was drained of all cystoscopic fluid, the cystoscope removed, and the Rodríguez catheter replaced.    All instruments were then removed and the robot undocked. The specimen was placed into a 10 endocatch bag and brought through the umbilical incision, removed with in-bag morcellation and sent for pathology. All trocars were then removed under direct visualization and the umbilical fascia was closed using a 0-vicryl interrupted sutures, and skin closed at all sites with 4-0 monocryl subcuticular suture and dermabond. Excellent hemostasis was noted.     Attention was then turned vaginally.  There is a very small residual cystocele due to the tensioning issues noted above (vaginal length reaching sacrum).  Decision was made to repair this with a small midline anterior repair.  Allis clamps were placed to delineate the anterior vaginal wall, separate from future sling placement area, which was injected superficially with 1 % lidocaine with dilute epinephrine. The anterior epithelium was incised in the midline sharply, dissecting the epithelium off the underlying pubocervical fibromuscular layer bilaterally without difficulty to the level of the pubic rami.  The pubocervical fascia was then plicated in the midline using interrupted 2-0 PDS suture in a single layer with excellent reduction in the cystocele.  Excess vaginal mucosa was then trimmed, a small amount of Surgiflo was placed into the deep dissection spaces, and epithelium re-approximated using 2-0 Vicryl in a running locked fashion in a single layer with care to include underlying pubocervical fascia to reduce dead space, with excellent hemostasis noted.      The retropubic mid urethral sling procedure then proceeded in the following fashion: The Rodríguez catheter was placed under traction and the bladder neck was delineated at the level of the Rodríguez bulb.  Allis clamps used to delineate the mid urethra  which was then injected with a solution of 1% lidocaine with dilute epinephrine.  A scalpel was then used to make an incision vertically in the epithelium over the urethra.  Sharp and blunt dissection was then used to dissect the vaginal epithelium away from the underlying pubocervical fascia to the level of the inferior right pubic ramus with Metzenbaum scissors with just enough space for the mesh to pass and lie flat.  This dissection was then repeated on the contralateral side.  Excellent hemostasis was noted.  Attention then turned the suprapubic area where approximately 1 fingerbreadth above the pubic symphysis and 2 cm lateral bilaterally, markings were made for the exit sites of the trochars.  The [employer] retropubic sling trochars were then passed underneath the pubic ramus and up through the anterior abdominal wall bilaterally and held.  Cystourethroscopy was then performed.  The Rodríguez catheter was removed and the 70 degree cystoscope was advanced into the bladder which was backfilled with sterile saline.  A 360 degree survey was performed which noted normal roll of the trochars behind the bladder wall with no visualized trocar or mesh.  There was also bilateral ureteral efflux of Pyridium stained urine. No urethral perforations or mesh were visualized.  The cystoscope was removed and the bladder drained of all cystoscopic fluid and the Rodríguez catheter was replaced.  The sling mesh was then tensioned against an 8 hegar dilator and cut at the anterior abdominal wall, with skin incisions covered with Dermabond. It was confirmed here was no undue tension on the urethra. Another exam noted that there was no perforation of mesh into the vaginal epithelium.  The vaginal epithelium was then closed with a 2-0 Vicryl running locked suture followed by 2 interrupted 2-0 Vicryl sutures with excellent hemostasis noted.      Attention was then turned posteriorly.  There was a palpable distal rectocele  and attenuated perineal body.  The posterior repair and perineorrhaphy then proceed in the following fashion: Allis clamps were placed on the posterior introitus in a triangle-shaped fashion on the posterior introitus with care not to over narrow the vaginal introitus.  The area was then infiltrated with 1 % lidocaine with dilute epinephrine.  A triangle-shaped incision was then made over the posterior vaginal wall and the perineal skin with a scalpel.  Skin was then sharply dissected from the underlying central tendon in the perineal body and the rectovaginal fascia out to the level of the levator fascia bilaterally.  The rectocele was then plicated in the midline with 2-0 PDS stratafix in a running layer with care to approximate the distal rectovaginal fascia to the central tendon of the perineal body. 0 Vicryl was then placed in the left deep transverse perineal muscle, and then using the same suture this was reattached to the external anal sphincter, then to the contralateral deep transverse perineal muscle and held.  The superficial transverse perineal muscle was then reapproximated in a similar fashion with 0 Vicryl on the left side and central tendon of the perineal body to the right side and held.  Perineorrhaphy suture were crossed and found not to narrow the introitus. The posterior vaginal epithelium was then trimmed.  The epithelium was then closed with a 2-0 Vicryl suture in a running locked fashion with care to incorporate part of the rectovaginal fascia to obliterate the dead space.  The perineorrhaphy sutures were tied serially, and a small amount of Surgiflo placed into the deep spaces to aid in venous hemostasis.  There was good lengthening of the perineal body with an adequate genital hiatus diameter.  The 2-0 Vicryl mucosal sutures then continued in a running fashion to the hymenal ring, which was then tied.   Another rectal exam was then performed with no sutures palpated.   Hemostasis was noted  at the completion of the procedure.  A vaginal pack was placed for temporary tamponade in the PACU, with plan of removal prior to discharge    All counts were correct.  The patient was then wakened from general anesthesia easily, and brought to the PACU in stable condition. Anticipate discharge home later today.         Antoni Patterson MD, FACOG  Urogynecology and Reconstructive Pelvic Surgery  Department of Obstetrics and Gynecology  [facility]

## 2024-02-07 NOTE — ANESTHESIA POSTPROCEDURE EVALUATION
Patient: Argelia Jimenez    Procedure Summary       Date: 02/07/24 Room / Location: [place] [id number] / SURGERY [facility]    Anesthesia Start: 1035 Anesthesia Stop:     Procedures:       ROBOTIC ASSISTED LAPAROSCOPIC SUPRACERVICAL HYSTERECTOMY, BILATERAL SALPINGO-OOPHORECTOMY, SACROCOLPOPEXY, POSSIBLE POSTERIOR REPAIR/PERINEORRHAPHY, POSSIBLE MID-URETHRAL SLING, CYSTOURETHROSCOPY      SACROCOLPOPEXY, ROBOT-ASSISTED, USING DA EMERALD XI      COLPORRHAPHY, POSTERIOR      BLADDER SLING, FEMALE Diagnosis: (INCOMPLETE UTEROVAGINAL PROLAPSE, CYSTOCELE LATERAL, RECTOCELE, STRESS URINARY INCONTINENCE)    Surgeons: Antoni Patterson M.D. Responsible Provider: Divine Birmingham M.D.    Anesthesia Type: general ASA Status: 3            Final Anesthesia Type: general  Last vitals  BP   Blood Pressure : 128/76    Temp   36.4 °C (97.6 °F)    Pulse   75   Resp   16    SpO2   98 %      Anesthesia Post Evaluation    Patient location during evaluation: PACU  Patient participation: complete - patient participated  Level of consciousness: awake and alert    Airway patency: patent  Anesthetic complications: no  Cardiovascular status: hemodynamically stable  Respiratory status: acceptable  Hydration status: euvolemic    PONV: none          No notable events documented.     Nurse Pain Score: 0 (NPRS)

## 2024-02-07 NOTE — ANESTHESIA TIME REPORT
Anesthesia Start and Stop Event Times       Date Time Event    2/7/2024 1019 Ready for Procedure     1035 Anesthesia Start     1415 Anesthesia Stop          Responsible Staff  02/07/24      Name Role Begin End    Divine Birmingham M.D. Anesth 1035 1415          Overtime Reason:  no overtime (within assigned shift)    Comments:

## 2024-02-07 NOTE — OR NURSING
PACU note- Patient arrived from the OR, breathing easily, alert and oriented. X7 abdominal lap sites, closed with surgical glue, open to air, no drainage observed. Vaginal packing and lawrence in place. Able to move all extremities, strong radial and pedal pulses, good capillary refill, denies numbness and tingling. Medicated for pain and nausea with good effect. Tolerating PO fluids without nausea. Called and updated spouse.

## 2024-02-07 NOTE — PROGRESS NOTES
Medication history reviewed with PT at bedside    [facility] rec is complete per PT reporting     Allergies reviewed.     Patient denies any outpatient antibiotics in the last 30 days.     Patient is taking Xarelto 20mg. Last dose was 02/03/24.    Preferred pharmacy for this visit - [facility] on [address] ([phone])

## 2024-02-08 ENCOUNTER — TELEPHONE (OUTPATIENT)
Dept: OBGYN | Facility: CLINIC | Age: 74
End: 2024-02-08
Payer: MEDICARE

## 2024-02-08 NOTE — OR NURSING
Bladder backfill performed as ordered. 300ml instilled and 250 voided easily. VSS, pt steady with ambulation, meets discharge criteria. Discharged home with family. Wheeled to car with hospital escort. [facility] CDI. IV dc'd, cathlon intact. Pt to f/u with physician as directed by physician. Pt to return to ER for any emergent sx. Pt verbalizes understanding of discharge instructions and all questions were answered.

## 2024-02-08 NOTE — TELEPHONE ENCOUNTER
Post-operative phone call    I called Ms. Jimenez and confirmed her identity. She is POD 1 s/p Robotic assisted laparoscopic supracervical hysterectomy, bilateral salpingo-oophorectomy. Robotic sacrocolpopexy. Anterior and posterior repair, perineorrhaphy. Retropubic midurethral sling, Cysto.     She reports doing well, pain controlled but has only taken oxycodone. Advised patient to take tylenol 1000 mg q8 hrs and to use oxycodone prn. She ambulating, tolerating regular diet, passing gas, no bowel movement today, but feels it will come, and emptying her bladder well. Denies nausea, vomiting, fever, chills, SOB, or heavy vaginal bleeding.   All questions answered. Advised patient to reach out via [employer] or call the office at [phone] should she have any questions or concerns prior to f/u.     She will follow up as scheduled on 3/21/24 with Dr. Patterson, or earlier if any issues arise.      SOSA Cohen, RNFA

## 2024-02-15 ENCOUNTER — PATIENT MESSAGE (OUTPATIENT)
Dept: GYNECOLOGY | Facility: CLINIC | Age: 74
End: 2024-02-15
Payer: MEDICARE

## 2024-02-15 DIAGNOSIS — M54.41 CHRONIC BILATERAL LOW BACK PAIN WITH BILATERAL SCIATICA: ICD-10-CM

## 2024-02-15 DIAGNOSIS — R39.9 UTI SYMPTOMS: ICD-10-CM

## 2024-02-15 DIAGNOSIS — M54.42 CHRONIC BILATERAL LOW BACK PAIN WITH BILATERAL SCIATICA: ICD-10-CM

## 2024-02-15 DIAGNOSIS — G89.29 CHRONIC BILATERAL LOW BACK PAIN WITH BILATERAL SCIATICA: ICD-10-CM

## 2024-02-15 RX ORDER — GABAPENTIN 300 MG/1
300 CAPSULE ORAL 3 TIMES DAILY
Qty: 90 CAPSULE | Refills: 0 | Status: SHIPPED | OUTPATIENT
Start: 2024-02-15

## 2024-02-15 NOTE — TELEPHONE ENCOUNTER
Received request via: Patient    Was the patient seen in the last year in this department? Yes    Does the patient have an active prescription (recently filled or refills available) for medication(s) requested? No    Pharmacy Name: [facility]    Does the patient have group home Plus and need 100 day supply (blood pressure, diabetes and cholesterol meds only)? Medication is not for cholesterol, blood pressure or diabetes

## 2024-02-16 ENCOUNTER — TELEPHONE (OUTPATIENT)
Dept: OBGYN | Facility: CLINIC | Age: 74
End: 2024-02-16
Payer: MEDICARE

## 2024-02-17 ENCOUNTER — HOSPITAL ENCOUNTER (OUTPATIENT)
Facility: MEDICAL CENTER | Age: 74
End: 2024-02-17
Attending: STUDENT IN AN ORGANIZED HEALTH CARE EDUCATION/TRAINING PROGRAM
Payer: MEDICARE

## 2024-02-17 DIAGNOSIS — R39.9 UTI SYMPTOMS: ICD-10-CM

## 2024-02-17 PROCEDURE — 87086 URINE CULTURE/COLONY COUNT: CPT

## 2024-02-19 ENCOUNTER — PATIENT MESSAGE (OUTPATIENT)
Dept: CARDIOLOGY | Facility: MEDICAL CENTER | Age: 74
End: 2024-02-19
Payer: MEDICARE

## 2024-02-19 LAB
BACTERIA UR CULT: NORMAL
SIGNIFICANT IND 70042: NORMAL
SITE SITE: NORMAL
SOURCE SOURCE: NORMAL

## 2024-03-06 ENCOUNTER — APPOINTMENT (RX ONLY)
Dept: URBAN - METROPOLITAN AREA CLINIC 20 | Facility: CLINIC | Age: 74
Setting detail: DERMATOLOGY
End: 2024-03-06

## 2024-03-06 DIAGNOSIS — L57.8 OTHER SKIN CHANGES DUE TO CHRONIC EXPOSURE TO NONIONIZING RADIATION: ICD-10-CM

## 2024-03-06 DIAGNOSIS — D22 MELANOCYTIC NEVI: ICD-10-CM

## 2024-03-06 DIAGNOSIS — D18.0 HEMANGIOMA: ICD-10-CM

## 2024-03-06 DIAGNOSIS — L56.8 OTHER SPECIFIED ACUTE SKIN CHANGES DUE TO ULTRAVIOLET RADIATION: ICD-10-CM

## 2024-03-06 DIAGNOSIS — L82.1 OTHER SEBORRHEIC KERATOSIS: ICD-10-CM

## 2024-03-06 DIAGNOSIS — L81.4 OTHER MELANIN HYPERPIGMENTATION: ICD-10-CM

## 2024-03-06 PROBLEM — D22.5 MELANOCYTIC NEVI OF TRUNK: Status: ACTIVE | Noted: 2024-03-06

## 2024-03-06 PROBLEM — D18.01 HEMANGIOMA OF SKIN AND SUBCUTANEOUS TISSUE: Status: ACTIVE | Noted: 2024-03-06

## 2024-03-06 PROCEDURE — 99213 OFFICE O/P EST LOW 20 MIN: CPT

## 2024-03-06 PROCEDURE — ? COUNSELING

## 2024-03-06 PROCEDURE — ? PRESCRIPTION

## 2024-03-06 RX ORDER — TRIAMCINOLONE ACETONIDE 1 MG/G
CREAM TOPICAL BID
Qty: 453.6 | Refills: 0 | Status: ERX | COMMUNITY
Start: 2024-03-06

## 2024-03-06 RX ADMIN — TRIAMCINOLONE ACETONIDE: 1 CREAM TOPICAL at 00:00

## 2024-03-06 ASSESSMENT — LOCATION SIMPLE DESCRIPTION DERM
LOCATION SIMPLE: LEFT BREAST
LOCATION SIMPLE: LEFT UPPER ARM
LOCATION SIMPLE: LEFT THIGH
LOCATION SIMPLE: RIGHT CHEEK
LOCATION SIMPLE: RIGHT UPPER BACK
LOCATION SIMPLE: RIGHT TEMPLE
LOCATION SIMPLE: LEFT CHEEK
LOCATION SIMPLE: RIGHT UPPER ARM
LOCATION SIMPLE: LEFT PRETIBIAL REGION
LOCATION SIMPLE: RIGHT FOREARM
LOCATION SIMPLE: LEFT ANTERIOR NECK
LOCATION SIMPLE: RIGHT PRETIBIAL REGION
LOCATION SIMPLE: CHEST
LOCATION SIMPLE: RIGHT THIGH
LOCATION SIMPLE: LEFT UPPER BACK
LOCATION SIMPLE: LEFT FOREARM

## 2024-03-06 ASSESSMENT — LOCATION DETAILED DESCRIPTION DERM
LOCATION DETAILED: RIGHT MID-UPPER BACK
LOCATION DETAILED: RIGHT DISTAL DORSAL FOREARM
LOCATION DETAILED: LEFT PROXIMAL DORSAL FOREARM
LOCATION DETAILED: LEFT ANTERIOR PROXIMAL UPPER ARM
LOCATION DETAILED: LEFT MID-UPPER BACK
LOCATION DETAILED: RIGHT VENTRAL PROXIMAL FOREARM
LOCATION DETAILED: RIGHT PROXIMAL DORSAL FOREARM
LOCATION DETAILED: LEFT VENTRAL PROXIMAL FOREARM
LOCATION DETAILED: LEFT INFERIOR CENTRAL MALAR CHEEK
LOCATION DETAILED: RIGHT MID TEMPLE
LOCATION DETAILED: LEFT ANTERIOR PROXIMAL THIGH
LOCATION DETAILED: LEFT MEDIAL BREAST 7-8:00 REGION
LOCATION DETAILED: LEFT ANTERIOR DISTAL THIGH
LOCATION DETAILED: LEFT MEDIAL SUPERIOR CHEST
LOCATION DETAILED: RIGHT ANTERIOR PROXIMAL UPPER ARM
LOCATION DETAILED: RIGHT INFERIOR UPPER BACK
LOCATION DETAILED: RIGHT SUPERIOR MEDIAL UPPER BACK
LOCATION DETAILED: LEFT DISTAL PRETIBIAL REGION
LOCATION DETAILED: LEFT DISTAL DORSAL FOREARM
LOCATION DETAILED: RIGHT CENTRAL MALAR CHEEK
LOCATION DETAILED: LEFT INFERIOR UPPER BACK
LOCATION DETAILED: RIGHT ANTERIOR DISTAL THIGH
LOCATION DETAILED: LEFT INFERIOR LATERAL NECK
LOCATION DETAILED: RIGHT PROXIMAL PRETIBIAL REGION
LOCATION DETAILED: RIGHT LATERAL MALAR CHEEK

## 2024-03-06 ASSESSMENT — LOCATION ZONE DERM
LOCATION ZONE: FACE
LOCATION ZONE: LEG
LOCATION ZONE: TRUNK
LOCATION ZONE: ARM
LOCATION ZONE: NECK

## 2024-03-07 DIAGNOSIS — N89.8 VAGINAL DRYNESS: ICD-10-CM

## 2024-03-08 RX ORDER — LEVOTHYROXINE SODIUM 88 UG/1
88 TABLET ORAL
Qty: 100 TABLET | Refills: 1 | Status: SHIPPED | OUTPATIENT
Start: 2024-03-08

## 2024-03-08 RX ORDER — ESTRADIOL 0.1 MG/G
1 CREAM VAGINAL DAILY
Qty: 42.5 G | Refills: 1 | Status: SHIPPED | OUTPATIENT
Start: 2024-03-08

## 2024-03-08 NOTE — TELEPHONE ENCOUNTER
Received request via: Pharmacy    Was the patient seen in the last year in this department? Yes    Does the patient have an active prescription (recently filled or refills available) for medication(s) requested? No    [facility]    Does the patient have [facility] and need 100 day supply (blood pressure, diabetes and cholesterol meds only)? Medication is not for cholesterol, blood pressure or diabetes

## 2024-03-11 ENCOUNTER — HOSPITAL ENCOUNTER (OUTPATIENT)
Dept: RADIOLOGY | Facility: MEDICAL CENTER | Age: 74
End: 2024-03-11
Attending: STUDENT IN AN ORGANIZED HEALTH CARE EDUCATION/TRAINING PROGRAM
Payer: MEDICARE

## 2024-03-11 DIAGNOSIS — Z12.31 VISIT FOR SCREENING MAMMOGRAM: ICD-10-CM

## 2024-03-11 PROCEDURE — 77067 SCR MAMMO BI INCL CAD: CPT

## 2024-03-20 NOTE — PROGRESS NOTES
Urogynecology and Pelvic Reconstructive Surgery Follow Up    Argelia Jimenez [id number]    Referred by: Jonas Metzger DO    Reason for Visit: Follow up      Subjective     History of Presenting Illness:     Argelia Jimenez is a 73 y.o. year old P2 who presents for follow up6 weeks s/p robotic supracervical hysterectomy/BSO, sacrocolpopexy, anterior/posterior repair and perineorrhaphy, retropubic sling.     She reports recovery going well, she has recently seen astride stick out and some vaginal bleeding, but no recurrent prolapse symptoms.  She has no new urinary incontinence and is emptying her bladder well.    Initial HPI: She was referred by Dr. Metzger for the evaluation and management of pelvic organ prolapse. Patient has a chronic history of constipation and recurrent UTIs. For the past 3 years she has been using topical estrogen with good relief of UTIs. However while applying the estrogen 7-8 months ago, she noticed a vaginal bulge. It has been progressively worsening. She was seen by Dr. Metzger in March for this issue, and states the bulge has increased in size since then. It is not painful, rather just uncomfortable, and she notices it while walking. Ms. Jimenez additionally has some mild urinary incontinence for 10+ years which does not bother her at this time. She has not noticed any change in her bowel or bladder symptoms with the prolapse.      She denies history of abnormal Pap smears.  She has not had postmenopausal bleeding since her D&C is in her mid 50s.     Prior Pelvic surgery:   Ruptured ovarian cyst (laparotomy)  Appendectmoy  2x D&C for menorrhagia from fibroids and polyps (early menopause)  24: robotic supracervical hysterectomy/BSO, sacrocolpopexy, anterior/posterior repair and perineorrhaphy, retropubic sling (Leonardo)                Prior treatment:   Miralax  D-mannose and estrogen for UTI                Fluid intake:   Half a gallon     Pelvic floor symptom  review:                 Bladder:              Voids per day: 6          Voids per night: 1 rarely                 Urinary incontinence episodes per day: 1-2               Urge leakage:  warm water trigger, but no incontinence              Stress leakage: With Cough and sneeze --> resolved              Continuous / insensible urine loss: No               Nocturnal enuresis: No               Leakage volume: Drops              Number of pads/day: 1 pad              Bladder emptying: Complete              Voiding symptoms: Post-Void Dribble              UTI in last 12 months: last 3 years ago after starting d-mannose and estrogen              Other urologic history: none                 Prolapse:                Prolapse symptoms: Resolved                Bowel:               Constipation: Yes, uses Miralax 2x weekly              Bowel movements per day: multiple small pellets a day 3-4x               Straining to empty bowels: Yes              Splinting to evacuate: Yes, not helping              Painful evacuation: No               Difficulty emptying rectum: Yes              Incontinence to stool: No              Blood in stool: Yes              Hemorrhoids: Yes              Bowel conditions: None, daughter with UC, brother diagnosed colon cancer in 40's              Most recent colonoscopy: <1 year ago, 1 polyp benign                  Sexual function:               Sexually active: No               Gender of partners: Male              Pain with intercourse: No                            Pelvic Pain: no        Past medical and surgical history     Past obstetric history              Number of vaginal deliveries: 2              Number of  deliveries: 0              History of vacuum/forceps: No               History of obstetric anal sphincter injury: No      Past gynecological history:               Last menstrual period: No LMP recorded. Patient is postmenopausal.              History of abnormal uterine  bleeding: Yes              History of fibroids: Yes              History of endometrial polyps:  Yes              History of endometriosis: No               History of cervical dysplasia: No               Last pap: 2023              Current contraception: None      Past medical history:  Past Medical History:   Diagnosis Date    Chronic bilateral low back pain with bilateral sciatica 10/31/2023    at present    PONV (postoperative nausea and vomiting) 10/31/2023    pt has history of motion sickness    Anesthesia 10/31/2023    severe PONV, history of motion sickness    Arthritis 10/31/2023    left wrist, right leg/hip    Cancer (HCC) 10/31/2023    thyroid at age 40    Hypertension 10/31/2023    medicated    Heart burn 10/31/2023    medicated    Indigestion 10/31/2023    medicated    Bowel habit changes 10/31/2023    constipation, medicated    Postsurgical hypothyroidism 10/31/2023    total thyroidectomy, medicated    Bronchitis 10/31/2023    history of    Adverse effect of anesthesia 1990 & 1993    Post-op vomitting, & neck/shoulder pain    Arrhythmia     PAC's,PVC's    Cataract 2017    Removed 2017 & 2018    Chronic use of opiate for therapeutic purpose     GERD (gastroesophageal reflux disease)     Gynecological disorder 2022    Pelvic Organ Prolapse-this surgery    High cholesterol 2021    Now taking Simvastatin    History of thyroid cancer     Hyperlipidemia     Insomnia     PAC (premature atrial contraction)     PSVT (paroxysmal supraventricular tachycardia)     Snoring 2010?    As reported by spouse    Urinary bladder disorder October 2020    Recurrent UTI    Urinary incontinence      Past surgical history:  Past Surgical History:   Procedure Laterality Date    DC LAPAROSCOPY, SURG, COLPOPEXY  2/7/2024    Procedure: SACROCOLPOPEXY, ROBOT-ASSISTED, USING DA EMERALD XI;  Surgeon: Antoni Patterson M.D.;  Location: SURGERY [facility];  Service: Gyn Robotic    DC POST COLPORRHAPHY,RECTUM/VAGINA  2/7/2024    Procedure:  ANTERIOR REPAIR / POSTERIOR REPAIR / PERINEORRHAPHY;  Surgeon: Antoni Patterson M.D.;  Location: SURGERY [facility];  Service: Gyn Robotic    WY CYSTOURETHROSCOPY  2/7/2024    Procedure: CYSTOURETHROSCOPY;  Surgeon: Antoni Patterson M.D.;  Location: SURGERY [facility];  Service: Gyn Robotic    ROBOTIC-ASSISTED LAPAROSCOPIC SUPRACERVICAL HYSTERECTOMY  2/7/2024    Procedure: ROBOTIC ASSISTED LAPAROSCOPIC SUPRACERVICAL HYSTERECTOMY, BILATERAL SALPINGO-OOPHORECTOMY;  Surgeon: Antoni Patterson M.D.;  Location: SURGERY [facility];  Service: Gyn Robotic    BLADDER SLING FEMALE  2/7/2024    Procedure: RETROPUBIC MIDURETHERAL SLING;  Surgeon: Antoni Patterson M.D.;  Location: SURGERY [facility];  Service: Gyn Robotic    OTHER Bilateral 10/31/2023    IOLOU    PB LAMINOTOMY,LUMBAR DISK,1 INTRSP N/A 03/03/2021    Procedure: LAMINECTOMY, SPINE, LUMBAR, WITH DISCECTOMY - L2-S1;  Surgeon: Robert Mcdaniel M.D.;  Location: SURGERY [facility];  Service: Neurosurgery    FORAMINOTOMY N/A 03/03/2021    Procedure: FORAMINOTOMY, SPINE;  Surgeon: Robert Mcdaniel M.D.;  Location: SURGERY [facility];  Service: Neurosurgery    OTHER  12/2020    lipoma left arm    COLONOSCOPY  2016    normal    THYROIDECTOMY Right 1993    THYROIDECTOMY Left 1990    OTHER Left 1971    ruptured ovarian cyst/appendectomy    APPENDECTOMY      GYN SURGERY  1971    Ruptured ovarian cyst    OTHER NEUROLOGICAL SURG  March 2021    Laminectomy    TONSILLECTOMY       Medications:has a current medication list which includes the following prescription(s): levothyroxine, estradiol, gabapentin, acetaminophen, scopolamine, ondansetron, diphenhydramine, loratadine, omeprazole, simvastatin, polyethylene glycol 3350, metoprolol sr, rivaroxaban, methocarbamol, d-mannose, garlic, hydrocodone-acetaminophen, and omega-3 fatty acids.  Allergies:Patient has no known allergies.  Family history:  Family History   Problem Relation Age of Onset    Cancer Mother 60        breast cancer    Heart Disease  Sister         rapid heartbeat    Cancer Brother         colon cancer    Alcohol/Drug Brother         alcoholism    No Known Problems Brother      Social history: reports that she has never smoked. She has never used smokeless tobacco. She reports current alcohol use of about 1.2 - 3.6 oz of alcohol per week. She reports that she does not use drugs.    Review of systems: A full review of systems was performed, and negative with the exception of want is noted above in the HPI.        Objective        There were no vitals taken for this visit.    Physical Exam  Vitals reviewed. Exam conducted with a chaperone present (MA - see notes.).   Constitutional:       Appearance: Normal appearance.   HENT:      Head: Normocephalic.      Mouth/Throat:      Mouth: Mucous membranes are moist.   Cardiovascular:      Rate and Rhythm: Normal rate.   Pulmonary:      Effort: Pulmonary effort is normal.   Abdominal:      Palpations: Abdomen is soft. There is no mass.      Tenderness: There is no abdominal tenderness.   Skin:     General: Skin is warm and dry.   Neurological:      Mental Status: She is alert.   Psychiatric:         Mood and Affect: Mood normal.         Genitourinary:    External female genitalia: WNL   Vulva: WNL   Bulbocavernosus reflex: Intact   Anal wink reflex: Intact   Perineal sensation: WNL   Urethra: Hypermobile--> resolved   Vagina: Atrophic, improved tissue quality, no mesh exposures visible or palpable   Atrophy: Mild   Cough stress test: Negative    Pelvic floor:    POP-Q: Postop Aa -2 / Ba -2 / TVL 9 / C -7.5 / D -4 / Ap -2.5 / Bp -2.5 / GH 3 / PB 2.5  Aa / Ba 0 with apex supported       Procedure Performed: No    Diagnostic test and records review:       Post-void residual: Postop 4 mL, performed by Bladder Scanner    Labs: n/a    Radiology: n/a    Documentation reviewed: Prior EMR Records         Assessment & Plan     Argelia Jimenez is a 73 y.o. year old P2 with symptomatic stage II uterovaginal  prolapse.     1. Incomplete uterovaginal prolapse postop s/p robotic Shannon/BSO/sacrocolpopexy/PA/MUS  -Well-healed, no recurrent prolapse, no mesh exposures.  Counseled on postoperative findings and her anatomy using a mirror.  She may return to all normal activities.  Continue using vaginal estrogen.  Follow-up as needed    4. Urinary incontinence, mixed  Significant proven s/p sling and prolapse repair    5. Atrophic vaginitis  Plan continuation of vaginal estrogen to prevent UTIs and prevent other urogenital symptoms of menopause at least once weekly             Antoni Patterson MD, FACOG  Urogynecology and Pelvic Reconstructive Surgery  Department of Obstetrics and Gynecology  [facility]        This medical record contains text that has been entered with the assistance of computer voice recognition and dictation software.  Therefore, it may contain unintended errors in text, spelling, punctuation, or grammar

## 2024-03-21 ENCOUNTER — GYNECOLOGY VISIT (OUTPATIENT)
Dept: GYNECOLOGY | Facility: CLINIC | Age: 74
End: 2024-03-21
Payer: MEDICARE

## 2024-03-21 VITALS
HEART RATE: 79 BPM | SYSTOLIC BLOOD PRESSURE: 127 MMHG | BODY MASS INDEX: 23.73 KG/M2 | WEIGHT: 147 LBS | DIASTOLIC BLOOD PRESSURE: 88 MMHG

## 2024-03-21 DIAGNOSIS — Z98.890 POSTOPERATIVE STATE: ICD-10-CM

## 2024-03-21 PROCEDURE — 99024 POSTOP FOLLOW-UP VISIT: CPT | Performed by: STUDENT IN AN ORGANIZED HEALTH CARE EDUCATION/TRAINING PROGRAM

## 2024-03-21 ASSESSMENT — FIBROSIS 4 INDEX: FIB4 SCORE: 1.68

## 2024-03-21 NOTE — PROGRESS NOTES
Patient here for Post Op Exam  Surgery Date: 2/7/24  PT States different anatomy which is concerning to her   PVR : 4 mL  Good #: [phone]   Pharmacy Verified

## 2024-03-28 DIAGNOSIS — Z78.0 POSTMENOPAUSAL: ICD-10-CM

## 2024-03-30 DIAGNOSIS — M54.42 CHRONIC BILATERAL LOW BACK PAIN WITH BILATERAL SCIATICA: ICD-10-CM

## 2024-03-30 DIAGNOSIS — G89.29 CHRONIC BILATERAL LOW BACK PAIN WITH BILATERAL SCIATICA: ICD-10-CM

## 2024-03-30 DIAGNOSIS — M54.41 CHRONIC BILATERAL LOW BACK PAIN WITH BILATERAL SCIATICA: ICD-10-CM

## 2024-04-01 RX ORDER — GABAPENTIN 300 MG/1
300 CAPSULE ORAL 3 TIMES DAILY
Qty: 270 CAPSULE | Refills: 0 | Status: SHIPPED | OUTPATIENT
Start: 2024-04-01

## 2024-04-01 NOTE — TELEPHONE ENCOUNTER
Received request via: Pharmacy    Was the patient seen in the last year in this department? Yes    Does the patient have an active prescription (recently filled or refills available) for medication(s) requested? No    [facility]    Does the [facility] and need 100 day supply (blood pressure, diabetes and cholesterol meds only)? Medication is not for cholesterol, blood pressure or diabetes

## 2024-05-01 ENCOUNTER — APPOINTMENT (OUTPATIENT)
Dept: RADIOLOGY | Facility: MEDICAL CENTER | Age: 74
End: 2024-05-01
Attending: STUDENT IN AN ORGANIZED HEALTH CARE EDUCATION/TRAINING PROGRAM
Payer: MEDICARE

## 2024-05-01 DIAGNOSIS — Z78.0 POSTMENOPAUSAL: ICD-10-CM

## 2024-05-08 ENCOUNTER — APPOINTMENT (OUTPATIENT)
Dept: MEDICAL GROUP | Facility: PHYSICIAN GROUP | Age: 74
End: 2024-05-08
Payer: MEDICARE

## 2024-05-13 RX ORDER — SIMVASTATIN 20 MG
20 TABLET ORAL DAILY
Qty: 100 TABLET | Refills: 1 | Status: SHIPPED | OUTPATIENT
Start: 2024-05-13

## 2024-06-05 ENCOUNTER — OFFICE VISIT (OUTPATIENT)
Dept: MEDICAL GROUP | Facility: PHYSICIAN GROUP | Age: 74
End: 2024-06-05
Payer: MEDICARE

## 2024-06-05 VITALS
HEIGHT: 66 IN | SYSTOLIC BLOOD PRESSURE: 126 MMHG | BODY MASS INDEX: 23.69 KG/M2 | RESPIRATION RATE: 16 BRPM | HEART RATE: 66 BPM | WEIGHT: 147.4 LBS | DIASTOLIC BLOOD PRESSURE: 76 MMHG | TEMPERATURE: 98.3 F | OXYGEN SATURATION: 96 %

## 2024-06-05 DIAGNOSIS — R05.1 ACUTE COUGH: ICD-10-CM

## 2024-06-05 DIAGNOSIS — J06.9 ACUTE URI: ICD-10-CM

## 2024-06-05 DIAGNOSIS — D68.69 SECONDARY HYPERCOAGULABLE STATE (HCC): ICD-10-CM

## 2024-06-05 DIAGNOSIS — I48.0 PAF (PAROXYSMAL ATRIAL FIBRILLATION) (HCC): ICD-10-CM

## 2024-06-05 PROCEDURE — 3074F SYST BP LT 130 MM HG: CPT | Performed by: STUDENT IN AN ORGANIZED HEALTH CARE EDUCATION/TRAINING PROGRAM

## 2024-06-05 PROCEDURE — 99213 OFFICE O/P EST LOW 20 MIN: CPT | Performed by: STUDENT IN AN ORGANIZED HEALTH CARE EDUCATION/TRAINING PROGRAM

## 2024-06-05 PROCEDURE — 3078F DIAST BP <80 MM HG: CPT | Performed by: STUDENT IN AN ORGANIZED HEALTH CARE EDUCATION/TRAINING PROGRAM

## 2024-06-05 RX ORDER — AMOXICILLIN AND CLAVULANATE POTASSIUM 875; 125 MG/1; MG/1
1 TABLET, FILM COATED ORAL 2 TIMES DAILY
Qty: 10 TABLET | Refills: 0 | Status: SHIPPED | OUTPATIENT
Start: 2024-06-08 | End: 2024-06-13

## 2024-06-05 RX ORDER — DEXTROMETHORPHAN HYDROBROMIDE AND PROMETHAZINE HYDROCHLORIDE 15; 6.25 MG/5ML; MG/5ML
5 SYRUP ORAL EVERY 4 HOURS PRN
Qty: 120 ML | Refills: 0 | Status: SHIPPED | OUTPATIENT
Start: 2024-06-05

## 2024-06-05 ASSESSMENT — FIBROSIS 4 INDEX: FIB4 SCORE: 1.68

## 2024-06-05 NOTE — PROGRESS NOTES
Chief Complaint   Patient presents with    Congestion     6 days, taking Mucinex and tylenol    Cough        HPI: Patient is a 73 y.o. female complaining of 6 days of illness including: productive of clear sputum cough, facial pressure, nasal congestion, rhinorrhea. Symptoms have worsened in the past 2 days.  Mucus is: thick.  Similarly ill exposures: no.  Treatments tried: Mucinex, Tylenol  She  reports that she has never smoked. She has never used smokeless tobacco.     ROS:  No fever, nausea, changes in bowel movements or skin rash.      I reviewed the patient's medications, allergies and medical history:  Current Outpatient Medications   Medication Sig Dispense Refill    promethazine-dextromethorphan (PROMETHAZINE-DM) 6.25-15 MG/5ML syrup Take 5 mL by mouth every four hours as needed for Cough. May cause drowsiness. 120 mL 0    [START ON 6/8/2024] amoxicillin-clavulanate (AUGMENTIN) 875-125 MG Tab Take 1 Tablet by mouth 2 times a day for 5 days. 10 Tablet 0    simvastatin (ZOCOR) 20 MG Tab TAKE 1 TABLET BY MOUTH EVERY  Tablet 1    gabapentin (NEURONTIN) 300 MG Cap TAKE 1 CAPSULE BY MOUTH THREE TIMES A  Capsule 0    levothyroxine (SYNTHROID) 88 MCG Tab TAKE 1 TABLET BY MOUTH EVERY DAY IN THE MORNING ON AN EMPTY STOMACH 100 Tablet 1    estradiol (ESTRACE) 0.1 MG/GM vaginal cream INSERT 1 G INTO THE VAGINA EVERY DAY. 42.5 g 1    acetaminophen (TYLENOL) 500 MG Tab Take 1,000 mg by mouth 3 times a day as needed for Moderate Pain.      diphenhydrAMINE (BENADRYL ALLERGY) 25 MG Tab Take 25 mg by mouth at bedtime as needed for Sleep.      loratadine (CLARITIN) 10 MG Tab Take 10 mg by mouth every day.      omeprazole (PRILOSEC) 40 MG delayed-release capsule Take 1 Capsule by mouth every Monday, Wednesday, and Friday. 100 Capsule 3    Polyethylene Glycol 3350 (MIRALAX PO) Take 17 g by mouth 1 time a day as needed (comnstipation). Twice weekly      metoprolol SR (TOPROL XL) 25 MG TABLET SR 24 HR Take 1 Tablet  by mouth every day. 100 Tablet 2    rivaroxaban (XARELTO) 20 MG Tab tablet Take 1 Tablet by mouth with dinner. 100 Tablet 3    methocarbamol (ROBAXIN) 750 MG Tab Take 750 mg by mouth 3 times a day.      D-MANNOSE PO Take 2,100 mg by mouth every day.      GARLIC OIL PO Take 1 tablet by mouth 2 times a day.      Omega-3 Fatty Acids (FISH OIL PO) Take 1 tablet by mouth.       No current facility-administered medications for this visit.     Patient has no known allergies.  Past Medical History:   Diagnosis Date    Adverse effect of anesthesia 1990 & 1993    Post-op vomitting, & neck/shoulder pain    Anesthesia 10/31/2023    severe PONV, history of motion sickness    Arrhythmia     PAC's,PVC's    Arthritis 10/31/2023    left wrist, right leg/hip    Bowel habit changes 10/31/2023    constipation, medicated    Bronchitis 10/31/2023    history of    Cancer (HCC) 10/31/2023    thyroid at age 40    Cataract 2017    Removed 2017 & 2018    Chronic bilateral low back pain with bilateral sciatica 10/31/2023    at present    Chronic use of opiate for therapeutic purpose     GERD (gastroesophageal reflux disease)     Gynecological disorder 2022    Pelvic Organ Prolapse-this surgery    Heart burn 10/31/2023    medicated    High cholesterol 2021    Now taking Simvastatin    History of thyroid cancer     Hyperlipidemia     Hypertension 10/31/2023    medicated    Indigestion 10/31/2023    medicated    Insomnia     PAC (premature atrial contraction)     PONV (postoperative nausea and vomiting) 10/31/2023    pt has history of motion sickness    Postsurgical hypothyroidism 10/31/2023    total thyroidectomy, medicated    PSVT (paroxysmal supraventricular tachycardia)     Snoring 2010?    As reported by spouse    Urinary bladder disorder October 2020    Recurrent UTI    Urinary incontinence         EXAM:  /76 (BP Location: Left arm, Patient Position: Sitting, BP Cuff Size: Adult)   Pulse 66   Temp 36.8 °C (98.3 °F) (Temporal)    "Resp 16   Ht 1.676 m (5' 6\")   Wt 66.9 kg (147 lb 6.4 oz)   SpO2 96%   General: Alert, no conversational dyspnea or audible wheeze, non-toxic appearance.  Eyes: PERRL, conjunctiva slightly injected, no eye discharge.  Ears: Normal pinnae,TM's normal bilaterally.  Nares: Patent with thin mucus.  Sinuses: non tender over maxillary / frontal sinuses.  Throat: Erythematous injection without exudate.   Neck: Supple, with no adenopathy.  Lungs: Clear to auscultation bilaterally, no wheeze, crackles or rhonchi.   Heart: Regular rate without murmur.  Skin: Warm and dry without rash.     ASSESSMENT:   1. Acute URI    2. Acute cough         PLAN:  1. Educated patient that majority of upper respiratory infections are viral and do not need antibiotics. As symptoms have been worsening over the last week over the past couple days, I will provide a delayed antibiotic.  Patient to start the antibiotic in 2 to 3 days if there has been no improvement in symptoms or if symptoms are worsening.  2. Twice daily use of nasal saline rinse or Neti-Pot.  3. OTC anti-pyretics and decongestants as needed.  4. Follow-up in office or urgent care for worsening symptoms, difficulty breathing, lack of expected recovery, or should new symptoms or problems arise.      [facility]    Diagnosis to address: D68.69 - Secondary hypercoagulable state (HCC)  Assessment and plan: Chronic, stable, as based on today's assessment and impact on other conditions evaluated today. Continue with current treatment plan: Secondary to atrial fibrillation, on Xarelto 20 mg daily. Follow-up with specialist as directed, but at least annually.  Diagnosis: I48.0 - PAF (paroxysmal atrial fibrillation) ([facility])  Assessment and plan: Chronic, stable, as based on today's assessment and impact on other conditions evaluated today. Continue with current treatment plan: Continue Xarelto and metoprolol per cardiology.  Follow-up with specialist as directed, but at least " annually.  Last edited 06/05/24 11:32 PDT by Lashell Walter P.A.-C.

## 2024-06-11 ENCOUNTER — APPOINTMENT (OUTPATIENT)
Dept: MEDICAL GROUP | Facility: PHYSICIAN GROUP | Age: 74
End: 2024-06-11
Payer: MEDICARE

## 2024-06-12 DIAGNOSIS — I47.10 PAROXYSMAL SUPRAVENTRICULAR TACHYCARDIA (HCC): ICD-10-CM

## 2024-06-12 DIAGNOSIS — D68.69 SECONDARY HYPERCOAGULABLE STATE (HCC): ICD-10-CM

## 2024-06-12 DIAGNOSIS — I49.1 PAC (PREMATURE ATRIAL CONTRACTION): ICD-10-CM

## 2024-06-12 RX ORDER — METOPROLOL SUCCINATE 25 MG/1
25 TABLET, EXTENDED RELEASE ORAL DAILY
Qty: 100 TABLET | Refills: 1 | Status: SHIPPED | OUTPATIENT
Start: 2024-06-12

## 2024-06-12 NOTE — TELEPHONE ENCOUNTER
Is the patient due for a refill? Yes    Was the patient seen the past year? Yes    Date of last office visit: 11/3/23    Does the patient have an upcoming appointment?  Yes   If yes, When? 8/12/24    Provider to refill:SHAYLEE    Does the patients insurance require a 100 day supply?  Yes

## 2024-06-14 ENCOUNTER — APPOINTMENT (OUTPATIENT)
Dept: MEDICAL GROUP | Facility: PHYSICIAN GROUP | Age: 74
End: 2024-06-14
Payer: MEDICARE

## 2024-06-14 VITALS
DIASTOLIC BLOOD PRESSURE: 60 MMHG | SYSTOLIC BLOOD PRESSURE: 110 MMHG | BODY MASS INDEX: 23.66 KG/M2 | TEMPERATURE: 98.3 F | HEIGHT: 66 IN | WEIGHT: 147.2 LBS | OXYGEN SATURATION: 96 % | RESPIRATION RATE: 20 BRPM | HEART RATE: 65 BPM

## 2024-06-14 DIAGNOSIS — E89.0 POSTSURGICAL HYPOTHYROIDISM: ICD-10-CM

## 2024-06-14 DIAGNOSIS — E78.5 DYSLIPIDEMIA: ICD-10-CM

## 2024-06-14 DIAGNOSIS — K21.9 GASTROESOPHAGEAL REFLUX DISEASE, UNSPECIFIED WHETHER ESOPHAGITIS PRESENT: ICD-10-CM

## 2024-06-14 DIAGNOSIS — K59.09 OTHER CONSTIPATION: ICD-10-CM

## 2024-06-14 PROBLEM — R19.7 DIARRHEA: Status: RESOLVED | Noted: 2023-01-05 | Resolved: 2024-06-14

## 2024-06-14 PROBLEM — F11.90 OPIOID USE: Status: RESOLVED | Noted: 2022-08-09 | Resolved: 2024-06-14

## 2024-06-14 PROCEDURE — 99214 OFFICE O/P EST MOD 30 MIN: CPT | Performed by: STUDENT IN AN ORGANIZED HEALTH CARE EDUCATION/TRAINING PROGRAM

## 2024-06-14 PROCEDURE — 3074F SYST BP LT 130 MM HG: CPT | Performed by: STUDENT IN AN ORGANIZED HEALTH CARE EDUCATION/TRAINING PROGRAM

## 2024-06-14 PROCEDURE — 3078F DIAST BP <80 MM HG: CPT | Performed by: STUDENT IN AN ORGANIZED HEALTH CARE EDUCATION/TRAINING PROGRAM

## 2024-06-14 RX ORDER — OMEPRAZOLE 20 MG/1
20 CAPSULE, DELAYED RELEASE ORAL DAILY
Qty: 100 CAPSULE | Refills: 2 | Status: SHIPPED | OUTPATIENT
Start: 2024-06-14

## 2024-06-14 ASSESSMENT — ENCOUNTER SYMPTOMS
HEADACHES: 0
DIZZINESS: 0
CHILLS: 0
SHORTNESS OF BREATH: 0
FEVER: 0

## 2024-06-14 ASSESSMENT — PATIENT HEALTH QUESTIONNAIRE - PHQ9: CLINICAL INTERPRETATION OF PHQ2 SCORE: 0

## 2024-06-14 ASSESSMENT — FIBROSIS 4 INDEX: FIB4 SCORE: 1.68

## 2024-06-14 NOTE — PROGRESS NOTES
Subjective:     CC:   Chief Complaint   Patient presents with    Other     Routine visit       HPI:   Argelia presents today with    Problem   Other Constipation    Patient reports this is a chronic problem.  She does have a bowel movement every day but states that she often experiences constipation and it can become quite uncomfortable.  She tries to stay hydrated and takes a daily fiber supplement.  She also uses MiraLAX as needed, usually about twice a week, and this does generally work to control her symptoms.     Dyslipidemia    Chronic condition.  On simvastatin 20 mg daily.  No side effects.      Lab Results   Component Value Date/Time    CHOLSTRLTOT 168 12/20/2023 0905    TRIGLYCERIDE 89 12/20/2023 0905    HDL 66 12/20/2023 0905    LDL 84 12/20/2023 0905          Postsurgical Hypothyroidism    This is a chronic condition.  S/p thyroidectomy for thyroid CA.  Current medications: levothyroxine 88 mcg daily  Last TSH: within normal limits 12/20/23  Symptoms: Denies fatigue, cold intolerance, weight change, constipation, dry skin, voice change.     Gerd (Gastroesophageal Reflux Disease)    Chronic kidney chair.  Patient currently takes omeprazole 40 mg 3 times a week in the mornings.  She thinks that omeprazole does work for her.  However she continues to experience symptoms, primarily at night, even if she stops eating a few hours before bedtime.     Diarrhea (Resolved)   Opioid Use (Resolved)         Past medical, family, and social history reviewed by me in Epic chart today.   Medications and allergies reviewed in Epic chart by me today.       Health Maintenance: Completed    ROS:  Review of Systems   Constitutional:  Negative for chills and fever.   Respiratory:  Negative for shortness of breath.    Cardiovascular:  Negative for chest pain.   Neurological:  Negative for dizziness and headaches.       Objective:     Exam:  /60 (BP Location: Left arm, Patient Position: Sitting, BP Cuff Size: Adult)    "Pulse 65   Temp 36.8 °C (98.3 °F) (Temporal)   Resp 20   Ht 1.676 m (5' 6\")   Wt 66.8 kg (147 lb 3.2 oz)   SpO2 96%   BMI 23.76 kg/m²  Body mass index is 23.76 kg/m².    Physical Exam  Vitals reviewed.   Constitutional:       General: She is not in acute distress.  Eyes:      Extraocular Movements: Extraocular movements intact.   Cardiovascular:      Rate and Rhythm: Normal rate and regular rhythm.      Heart sounds: No murmur heard.     No friction rub. No gallop.   Pulmonary:      Effort: Pulmonary effort is normal.      Breath sounds: No wheezing, rhonchi or rales.   Musculoskeletal:      Cervical back: Neck supple.   Skin:     General: Skin is warm and dry.   Neurological:      Mental Status: She is alert.   Psychiatric:         Mood and Affect: Mood normal.           Assessment & Plan:     73 y.o. female with the following -     1. Gastroesophageal reflux disease, unspecified whether esophagitis present  Chronic, uncontrolled.  Recommend she start taking omeprazole daily in the evenings.  I have provided a refill today.  Discussed risks and benefits of prolonged use of PPIs.  - omeprazole (PRILOSEC) 20 MG delayed-release capsule; Take 1 Capsule by mouth every day.  Dispense: 100 Capsule; Refill: 2    2. Postsurgical hypothyroidism  Chronic, controlled.  Continue levothyroxine at current dosage.    3. Other constipation  Chronic, stable.  Education provided.    4. Dyslipidemia  Chronic, controlled.  Continue simvastatin at current dosing.              Return in about 3 months (around 9/14/2024) for follow up.    Please note that this dictation was created using voice recognition software. I have made every reasonable attempt to correct obvious errors, but I expect that there are errors of grammar and possibly content that I did not discover before finalizing the note.        "

## 2024-06-14 NOTE — PATIENT INSTRUCTIONS
FOR CONSTIPATION  Constipation is a fairly common problem, and fortunately there are many good treatments available.  Listed below are things you can do to help with this issue.  I recommend you start with the first suggestion listed, and if that is not enough to help then keep working your way down the list until you get to the point where you are having normal bowel movements, then try backing off down the list to see if you can maintain normal bowel movements with something less aggressive.    First Line Treatment  1)  Increasing water intake to about 80 ounces a day (a bit more than a half gallon of water) can help.  In addition, regular exercise can make an enormous difference besides being generally good for you anyway.  Making sure your diet includes plenty of fruits and vegetables is also very helpful.    2)  Adding additional fiber to your diet with products like metamucil, benefiber, citrucel, or psyllium can help by adding bulk to your stool, keeping it from getting quite so packed down. We recommend 25 grams of fiber per day for women and 32 grams of fiber per day for men. Below are some common foods which are high in dietary fiber.    Common Foods Fiber Content (grams)   Raspberries, 1 cup 8.0   Pear 5.5   Apple with skin 4.8   Green peas, cooked, 1 cup 8.8   Lentils, cooked, 1/2 cup 7.8   Black beans, cooked, 1/2 cup 7.5   Broccoli, cooked, 1 cup 5.2   Carrots, cooked, 1 cup 4.8   Shredded wheat, 1 cup 6.2   Whole wheat spaghetti, cooked, 1 cup 6.0   Bran flakes, 3/4 cup 5.5   Pumpkin seeds, 1 oz 5.2   Almonds, 1 oz 3.5   Sunflower seeds, 1 oz 3.1       If the above does not work, then:  3) Polyethylene glycol (Miralax or its generic equivalent) is an osmotic laxative, meaning it brings extra water into your colon, helping keep your stool from getting hard and packed down.  One capful a day should give normal soft stool within about 2-3 days.  If not, consider trying 1 capful twice a day.    4) Milk of  magnesia (30 ml daily) or magnesium citrate (1/2 to 1 bottle daily), or a bisacodyl (Dulcolax) suppository can be used next to get things moving.    5) If this has been ineffective, using Senna-S, 1-2 tablets one to two times daily can be helpful.    6) If all these measures have been ineffective, you can try a mineral oil enema or a warm tap water enema to see if this helps things.

## 2024-06-22 DIAGNOSIS — M54.41 CHRONIC BILATERAL LOW BACK PAIN WITH BILATERAL SCIATICA: ICD-10-CM

## 2024-06-22 DIAGNOSIS — G89.29 CHRONIC BILATERAL LOW BACK PAIN WITH BILATERAL SCIATICA: ICD-10-CM

## 2024-06-22 DIAGNOSIS — M54.42 CHRONIC BILATERAL LOW BACK PAIN WITH BILATERAL SCIATICA: ICD-10-CM

## 2024-06-25 RX ORDER — GABAPENTIN 300 MG/1
300 CAPSULE ORAL 3 TIMES DAILY
Qty: 270 CAPSULE | Refills: 1 | Status: SHIPPED | OUTPATIENT
Start: 2024-06-25

## 2024-07-01 ENCOUNTER — APPOINTMENT (OUTPATIENT)
Dept: MEDICAL GROUP | Facility: PHYSICIAN GROUP | Age: 74
End: 2024-07-01
Payer: MEDICARE

## 2024-07-01 VITALS
WEIGHT: 145.8 LBS | SYSTOLIC BLOOD PRESSURE: 118 MMHG | TEMPERATURE: 98.1 F | HEART RATE: 64 BPM | DIASTOLIC BLOOD PRESSURE: 72 MMHG | BODY MASS INDEX: 23.43 KG/M2 | HEIGHT: 66 IN | OXYGEN SATURATION: 98 %

## 2024-07-01 DIAGNOSIS — Z23 NEED FOR VACCINATION: ICD-10-CM

## 2024-07-01 DIAGNOSIS — M79.601 PAIN OF RIGHT UPPER EXTREMITY: Primary | ICD-10-CM

## 2024-07-01 PROCEDURE — 99213 OFFICE O/P EST LOW 20 MIN: CPT | Mod: 25 | Performed by: FAMILY MEDICINE

## 2024-07-01 PROCEDURE — 90471 IMMUNIZATION ADMIN: CPT | Performed by: FAMILY MEDICINE

## 2024-07-01 PROCEDURE — 90715 TDAP VACCINE 7 YRS/> IM: CPT | Performed by: FAMILY MEDICINE

## 2024-07-01 PROCEDURE — 3074F SYST BP LT 130 MM HG: CPT | Performed by: FAMILY MEDICINE

## 2024-07-01 PROCEDURE — 3078F DIAST BP <80 MM HG: CPT | Performed by: FAMILY MEDICINE

## 2024-07-01 RX ORDER — TRIAMCINOLONE ACETONIDE 1 MG/G
CREAM TOPICAL 2 TIMES DAILY
COMMUNITY

## 2024-07-01 ASSESSMENT — FIBROSIS 4 INDEX: FIB4 SCORE: 1.68

## 2024-08-12 ENCOUNTER — OFFICE VISIT (OUTPATIENT)
Dept: CARDIOLOGY | Facility: MEDICAL CENTER | Age: 74
End: 2024-08-12
Attending: INTERNAL MEDICINE
Payer: MEDICARE

## 2024-08-12 VITALS
OXYGEN SATURATION: 97 % | WEIGHT: 149 LBS | BODY MASS INDEX: 23.95 KG/M2 | SYSTOLIC BLOOD PRESSURE: 110 MMHG | HEIGHT: 66 IN | HEART RATE: 68 BPM | DIASTOLIC BLOOD PRESSURE: 68 MMHG

## 2024-08-12 DIAGNOSIS — R00.2 PALPITATIONS: ICD-10-CM

## 2024-08-12 DIAGNOSIS — D68.69 SECONDARY HYPERCOAGULABLE STATE (HCC): ICD-10-CM

## 2024-08-12 DIAGNOSIS — I48.0 PAF (PAROXYSMAL ATRIAL FIBRILLATION) (HCC): ICD-10-CM

## 2024-08-12 DIAGNOSIS — I47.10 PAROXYSMAL SUPRAVENTRICULAR TACHYCARDIA (HCC): ICD-10-CM

## 2024-08-12 DIAGNOSIS — Z79.01 ANTICOAGULATED: ICD-10-CM

## 2024-08-12 PROCEDURE — 99213 OFFICE O/P EST LOW 20 MIN: CPT | Performed by: INTERNAL MEDICINE

## 2024-08-12 PROCEDURE — 3074F SYST BP LT 130 MM HG: CPT | Performed by: INTERNAL MEDICINE

## 2024-08-12 PROCEDURE — 3078F DIAST BP <80 MM HG: CPT | Performed by: INTERNAL MEDICINE

## 2024-08-12 PROCEDURE — 99214 OFFICE O/P EST MOD 30 MIN: CPT | Performed by: INTERNAL MEDICINE

## 2024-08-12 ASSESSMENT — ENCOUNTER SYMPTOMS
DIZZINESS: 0
PALPITATIONS: 1
LOSS OF CONSCIOUSNESS: 0
SHORTNESS OF BREATH: 0
MYALGIAS: 0
COUGH: 0

## 2024-08-12 ASSESSMENT — FIBROSIS 4 INDEX: FIB4 SCORE: 1.68

## 2024-08-12 NOTE — PROGRESS NOTES
Chief Complaint   Patient presents with    Paroxysmal Supraventricular Tachycardia (PSVT)    Palpitations    Dyslipidemia       Subjective     Argelia Jimenez is a 73 y.o. female who presents today follow-up cardiac care.    The patient has a history of SVT, PAF, OAC on rivaroxaban, GERD, thyroid cancer, thyroidectomy, laminectomy, hypertension, dyslipidemia    Since 11/3/2023 appointment the patient has had no cardiac symptoms including chest pain or shortness of breath.  Has noted rare brief palpitations with Apple Watch showing no evidence of atrial fibrillation or SVT.  Otherwise doing well, remains active.    Past Medical History:   Diagnosis Date    Adverse effect of anesthesia 1990 & 1993    Post-op vomitting, & neck/shoulder pain    Anesthesia 10/31/2023    severe PONV, history of motion sickness    Arrhythmia     PAC's,PVC's    Arthritis 10/31/2023    left wrist, right leg/hip    Bowel habit changes 10/31/2023    constipation, medicated    Bronchitis 10/31/2023    history of    Cancer (HCC) 10/31/2023    thyroid at age 40    Cataract 2017    Removed 2017 & 2018    Chronic bilateral low back pain with bilateral sciatica 10/31/2023    at present    Chronic use of opiate for therapeutic purpose     GERD (gastroesophageal reflux disease)     Gynecological disorder 2022    Pelvic Organ Prolapse-this surgery    Heart burn 10/31/2023    medicated    High cholesterol 2021    Now taking Simvastatin    History of thyroid cancer     Hyperlipidemia     Hypertension 10/31/2023    medicated    Indigestion 10/31/2023    medicated    Insomnia     PAC (premature atrial contraction)     PONV (postoperative nausea and vomiting) 10/31/2023    pt has history of motion sickness    Postsurgical hypothyroidism 10/31/2023    total thyroidectomy, medicated    PSVT (paroxysmal supraventricular tachycardia) (HCC)     Snoring 2010?    As reported by spouse    Urinary bladder disorder October 2020    Recurrent UTI    Urinary  incontinence      Past Surgical History:   Procedure Laterality Date    ND LAPAROSCOPY, SURG, COLPOPEXY  2/7/2024    Procedure: SACROCOLPOPEXY, ROBOT-ASSISTED, USING DA EMERALD XI;  Surgeon: Antoni Patterson M.D.;  Location: SURGERY [facility];  Service: Gyn Robotic    ND POST COLPORRHAPHY,RECTUM/VAGINA  2/7/2024    Procedure: ANTERIOR REPAIR / POSTERIOR REPAIR / PERINEORRHAPHY;  Surgeon: Antoni Patterson M.D.;  Location: SURGERY [facility];  Service: Gyn Robotic    ND CYSTOURETHROSCOPY  2/7/2024    Procedure: CYSTOURETHROSCOPY;  Surgeon: Antoni Patterson M.D.;  Location: SURGERY [facility];  Service: Gyn Robotic    ROBOTIC-ASSISTED LAPAROSCOPIC SUPRACERVICAL HYSTERECTOMY  2/7/2024    Procedure: ROBOTIC ASSISTED LAPAROSCOPIC SUPRACERVICAL HYSTERECTOMY, BILATERAL SALPINGO-OOPHORECTOMY;  Surgeon: Antoni Patterson M.D.;  Location: SURGERY [facility];  Service: Gyn Robotic    BLADDER SLING FEMALE  2/7/2024    Procedure: RETROPUBIC MIDURETHERAL SLING;  Surgeon: Antoni Patterson M.D.;  Location: SURGERY [facility];  Service: Gyn Robotic    OTHER Bilateral 10/31/2023    IOLOU    PB LAMINOTOMY,LUMBAR DISK,1 INTRSP N/A 03/03/2021    Procedure: LAMINECTOMY, SPINE, LUMBAR, WITH DISCECTOMY - L2-S1;  Surgeon: Robert Mcdaniel M.D.;  Location: SURGERY [facility];  Service: Neurosurgery    FORAMINOTOMY N/A 03/03/2021    Procedure: FORAMINOTOMY, SPINE;  Surgeon: Robert Mcdaniel M.D.;  Location: SURGERY [facility];  Service: Neurosurgery    OTHER  12/2020    lipoma left arm    COLONOSCOPY  2016    normal    THYROIDECTOMY Right 1993    THYROIDECTOMY Left 1990    OTHER Left 1971    ruptured ovarian cyst/appendectomy    APPENDECTOMY      GYN SURGERY  1971    Ruptured ovarian cyst    OTHER NEUROLOGICAL SURG  March 2021    Laminectomy    TONSILLECTOMY       Family History   Problem Relation Age of Onset    Cancer Mother 60        breast cancer    Heart Disease Sister         rapid heartbeat    Cancer Brother         colon cancer    Alcohol/Drug Brother          alcoholism    No Known Problems Brother      Social History     Socioeconomic History    Marital status:      Spouse name: Not on file    Number of children: Not on file    Years of education: Not on file    Highest education level: Associate degree: occupational, technical, or vocational program   Occupational History    Occupation:     Tobacco Use    Smoking status: Never    Smokeless tobacco: Never   Vaping Use    Vaping status: Never Used   Substance and Sexual Activity    Alcohol use: Yes     Alcohol/week: 1.2 - 3.6 oz     Types: 2 - 6 Glasses of wine per week     Comment: 1-2 drinks, 2-3 times weekly    Drug use: No    Sexual activity: Not Currently     Partners: Male     Birth control/protection: Post-Menopausal     Comment: 2 childen  (son and daughter)   Other Topics Concern    Not on file   Social History Narrative    Not on file     Social Determinants of Health     Financial Resource Strain: Low Risk  (11/5/2023)    Overall Financial Resource Strain (CARDIA)     Difficulty of Paying Living Expenses: Not hard at all   Food Insecurity: No Food Insecurity (11/5/2023)    Hunger Vital Sign     Worried About Running Out of Food in the Last Year: Never true     Ran Out of Food in the Last Year: Never true   Transportation Needs: No Transportation Needs (11/5/2023)    PRAPARE - Transportation     Lack of Transportation (Medical): No     Lack of Transportation (Non-Medical): No   Physical Activity: Inactive (11/5/2023)    Exercise Vital Sign     Days of Exercise per Week: 0 days     Minutes of Exercise per Session: 0 min   Stress: No Stress Concern Present (11/5/2023)    [employer] [place] [employer] - Occupational Stress Questionnaire     Feeling of Stress : Only a little   Social Connections: Moderately Integrated (11/5/2023)    Social Connection and Isolation Panel [NHANES]     Frequency of Communication with Friends and Family: Once a week     Frequency of Social Gatherings with Friends  and Family: Once a week     Attends Zoroastrianism Services: More than 4 times per year     Active Member of Clubs or Organizations: Yes     Attends Club or Organization Meetings: More than 4 times per year     Marital Status:    Intimate Partner Violence: Not on file   Housing Stability: Low Risk  (11/5/2023)    Housing Stability Vital Sign     Unable to Pay for Housing in the Last Year: No     Number of Places Lived in the Last Year: 1     Unstable Housing in the Last Year: No     No Known Allergies  Outpatient Encounter Medications as of 8/12/2024   Medication Sig Dispense Refill    magnesium hydroxide (MILK OF MAGNESIA) 400 MG/5ML Suspension Take 30 mL by mouth 1 time a day as needed. Pt uses as needed      gabapentin (NEURONTIN) 300 MG Cap TAKE 1 CAPSULE BY MOUTH THREE TIMES A  Capsule 1    omeprazole (PRILOSEC) 20 MG delayed-release capsule Take 1 Capsule by mouth every day. 100 Capsule 2    metoprolol SR (TOPROL XL) 25 MG TABLET SR 24 HR TAKE 1 TABLET BY MOUTH EVERY  Tablet 1    simvastatin (ZOCOR) 20 MG Tab TAKE 1 TABLET BY MOUTH EVERY  Tablet 1    levothyroxine (SYNTHROID) 88 MCG Tab TAKE 1 TABLET BY MOUTH EVERY DAY IN THE MORNING ON AN EMPTY STOMACH 100 Tablet 1    estradiol (ESTRACE) 0.1 MG/GM vaginal cream INSERT 1 G INTO THE VAGINA EVERY DAY. 42.5 g 1    acetaminophen (TYLENOL) 500 MG Tab Take 1,000 mg by mouth 3 times a day as needed for Moderate Pain.      diphenhydrAMINE (BENADRYL ALLERGY) 25 MG Tab Take 25 mg by mouth at bedtime as needed for Sleep.      loratadine (CLARITIN) 10 MG Tab Take 10 mg by mouth every day.      rivaroxaban (XARELTO) 20 MG Tab tablet Take 1 Tablet by mouth with dinner. 100 Tablet 3    methocarbamol (ROBAXIN) 750 MG Tab Take 750 mg by mouth 3 times a day.      D-MANNOSE PO Take 2,100 mg by mouth every day.      GARLIC OIL PO Take 1 tablet by mouth 2 times a day.      Omega-3 Fatty Acids (FISH OIL PO) Take 1 tablet by mouth.      triamcinolone acetonide  "(KENALOG) 0.1 % Cream Apply  topically 2 times a day. (Patient not taking: Reported on 8/12/2024)      Polyethylene Glycol 3350 (MIRALAX PO) Take 17 g by mouth 1 time a day as needed (comnstipation). Twice weekly (Patient not taking: Reported on 8/12/2024)       No facility-administered encounter medications on file as of 8/12/2024.     Review of Systems   Respiratory:  Negative for cough and shortness of breath.    Cardiovascular:  Positive for palpitations. Negative for chest pain.   Musculoskeletal:  Negative for myalgias.   Neurological:  Negative for dizziness and loss of consciousness.              Objective     /68 (BP Location: Left arm, Patient Position: Sitting, BP Cuff Size: Adult)   Pulse 68   Ht 1.676 m (5' 6\")   Wt 67.6 kg (149 lb)   SpO2 97%   BMI 24.05 kg/m²     Physical Exam  Vitals reviewed.   Constitutional:       General: She is not in acute distress.     Appearance: She is well-developed.   Eyes:      Conjunctiva/sclera: Conjunctivae normal.      Pupils: Pupils are equal, round, and reactive to light.   Neck:      Vascular: No JVD.   Cardiovascular:      Rate and Rhythm: Normal rate and regular rhythm.      Pulses:           Radial pulses are 1+ on the right side and 1+ on the left side.      Heart sounds: Normal heart sounds. No murmur heard.     No friction rub. No gallop.   Pulmonary:      Effort: Pulmonary effort is normal. No accessory muscle usage or respiratory distress.      Breath sounds: Normal breath sounds. No wheezing or rales.   Musculoskeletal:      Right lower leg: No edema.      Left lower leg: No edema.   Skin:     General: Skin is warm and dry.      Findings: No rash.      Nails: There is no clubbing.   Neurological:      Mental Status: She is alert and oriented to person, place, and time.   Psychiatric:         Behavior: Behavior normal.          ECHOCARDIOGRAM 12/14/2018  No prior study is available for comparison.   Left ventricular ejection fraction is visually " estimated to be 55%.  Normal inferior vena cava size and inspiratory collapse.  Normal transthoracic echocardiogram.        Assessment & Plan     1. PAF (paroxysmal atrial fibrillation) (HCC)        2. Palpitations        3. PSVT (paroxysmal supraventricular tachycardia) (HCC)        4. Secondary hypercoagulable state (HCC)        5. Anticoagulated                Medical Decision Making: Today's Assessment/Status/Plan:   PAF  SVT  OAC on rivaroxaban  Coronary calcification  Dyslipidemia  S/P thyroidectomy for thyroid cancer  Lumbar DDD, prior laminectomy  Chronic back pain    Recommendation Discussion  Clinically stable from a cardiac standpoint  SVT, palpitation episodes rare, continue metoprolol, rivaroxaban  OAC on rivaroxaban, calculated GFR 55, continue 20 mg daily, continue to monitor renal function  Coronary calcification, no ischemic symptoms, on simvastatin  Dyslipidemia, on simvastatin LDL 84, per PCP  RTC 9 months.

## 2024-08-13 DIAGNOSIS — N89.8 VAGINAL DRYNESS: ICD-10-CM

## 2024-08-14 RX ORDER — ESTRADIOL 0.1 MG/G
1 CREAM VAGINAL DAILY
Qty: 42.5 G | Refills: 0 | Status: SHIPPED | OUTPATIENT
Start: 2024-08-14

## 2024-08-14 NOTE — TELEPHONE ENCOUNTER
Received request via: Pharmacy    Was the patient seen in the last year in this department? Yes    Does the patient have an active prescription (recently filled or refills available) for medication(s) requested? No    [facility]    Does the patient have senior living Plus and need 100-day supply? (This applies to ALL medications) Yes, quantity updated to 100 days

## 2024-08-30 ENCOUNTER — TELEPHONE (OUTPATIENT)
Dept: HEALTH INFORMATION MANAGEMENT | Facility: OTHER | Age: 74
End: 2024-08-30
Payer: MEDICARE

## 2024-08-30 NOTE — TELEPHONE ENCOUNTER
1. Caller Name: Argelia Jimenez                          Call Back Number: [phone]      How would the patient prefer to be contacted with a response: MyChart message    2. SPECIFIC Action To Be Taken: Referral pending, please sign.    3. Diagnosis/Clinical Reason for Request: Toenails trimmed    4. Specialty & Provider Name/Lab/Imaging Location: Podiatrist    5. Is appointment scheduled for requested order/referral: no    Patient was informed they will receive a return phone call from the office ONLY if there are any questions before processing their request. Advised to call back if they haven't received a call from the referral department in 5 days.    [employer] Message from Member:  Argelia Jimenez would like to request a referral.  Reason: Toenails trimmed  Requested provider: Podiatrist  Comment:  Is this a covered benefit?  I have had back surgery in 2021 & it is painful, difficult to trim my toenails.

## 2024-09-18 RX ORDER — LEVOTHYROXINE SODIUM 88 UG/1
88 TABLET ORAL
Qty: 100 TABLET | Refills: 2 | Status: SHIPPED | OUTPATIENT
Start: 2024-09-18

## 2024-09-18 NOTE — TELEPHONE ENCOUNTER
Received request via: Pharmacy    Was the patient seen in the last year in this department? Yes    Does the patient have an active prescription (recently filled or refills available) for medication(s) requested? No    Pharmacy Name: [facility]/pharmacy #[id number] - [place] - [id number]     Does the patient have half-way Plus and need 100-day supply? (This applies to ALL medications) Yes, quantity updated to 100 days

## 2024-09-24 ENCOUNTER — OFFICE VISIT (OUTPATIENT)
Dept: MEDICAL GROUP | Facility: PHYSICIAN GROUP | Age: 74
End: 2024-09-24
Payer: MEDICARE

## 2024-09-24 VITALS
SYSTOLIC BLOOD PRESSURE: 120 MMHG | WEIGHT: 147 LBS | HEIGHT: 66 IN | HEART RATE: 65 BPM | TEMPERATURE: 97.6 F | BODY MASS INDEX: 23.63 KG/M2 | OXYGEN SATURATION: 98 % | DIASTOLIC BLOOD PRESSURE: 78 MMHG

## 2024-09-24 DIAGNOSIS — Z13.1 SCREENING FOR DIABETES MELLITUS: ICD-10-CM

## 2024-09-24 DIAGNOSIS — E78.5 DYSLIPIDEMIA: ICD-10-CM

## 2024-09-24 DIAGNOSIS — M85.80 OSTEOPENIA, UNSPECIFIED LOCATION: ICD-10-CM

## 2024-09-24 DIAGNOSIS — E89.0 POSTSURGICAL HYPOTHYROIDISM: ICD-10-CM

## 2024-09-24 DIAGNOSIS — Z23 NEED FOR VACCINATION: ICD-10-CM

## 2024-09-24 DIAGNOSIS — K21.9 GASTROESOPHAGEAL REFLUX DISEASE, UNSPECIFIED WHETHER ESOPHAGITIS PRESENT: ICD-10-CM

## 2024-09-24 DIAGNOSIS — I48.0 PAF (PAROXYSMAL ATRIAL FIBRILLATION) (HCC): ICD-10-CM

## 2024-09-24 DIAGNOSIS — D75.89 MACROCYTOSIS: ICD-10-CM

## 2024-09-24 PROCEDURE — 3074F SYST BP LT 130 MM HG: CPT | Performed by: STUDENT IN AN ORGANIZED HEALTH CARE EDUCATION/TRAINING PROGRAM

## 2024-09-24 PROCEDURE — 3078F DIAST BP <80 MM HG: CPT | Performed by: STUDENT IN AN ORGANIZED HEALTH CARE EDUCATION/TRAINING PROGRAM

## 2024-09-24 PROCEDURE — 90662 IIV NO PRSV INCREASED AG IM: CPT | Performed by: STUDENT IN AN ORGANIZED HEALTH CARE EDUCATION/TRAINING PROGRAM

## 2024-09-24 PROCEDURE — G0008 ADMIN INFLUENZA VIRUS VAC: HCPCS | Performed by: STUDENT IN AN ORGANIZED HEALTH CARE EDUCATION/TRAINING PROGRAM

## 2024-09-24 PROCEDURE — 99214 OFFICE O/P EST MOD 30 MIN: CPT | Mod: 25 | Performed by: STUDENT IN AN ORGANIZED HEALTH CARE EDUCATION/TRAINING PROGRAM

## 2024-09-24 RX ORDER — OMEPRAZOLE 40 MG/1
40 CAPSULE, DELAYED RELEASE ORAL DAILY
Qty: 100 CAPSULE | Refills: 0 | Status: SHIPPED | OUTPATIENT
Start: 2024-09-24

## 2024-09-24 ASSESSMENT — ENCOUNTER SYMPTOMS
FEVER: 0
DIZZINESS: 0
CHILLS: 0
SHORTNESS OF BREATH: 0
HEADACHES: 0

## 2024-09-24 ASSESSMENT — FIBROSIS 4 INDEX: FIB4 SCORE: 1.7

## 2024-09-24 NOTE — PROGRESS NOTES
Verbal consent was acquired by the patient to use [employer] ambient listening note generation during this visit.    Subjective:     HPI:   History of Present Illness  The patient presents for routine follow-up.    She is currently taking omeprazole 20 mg daily for GERD.  She prefers to take 40 mg approximately every other day.  Reports this provides adequate control of symptoms and is more convenient.      She is under the care of [facility] for her back issues and is currently considering radiofrequency Ablation (RFA).She is trying to avoid surgery and has previously undergone a laminectomy.     She continues levothyroxine for postsurgical hypothyroidism.      She recently had a consultation with her cardiologist for PAF and PSVT. She reports no issues. She uses an Apple watch to monitor her heart rate. She is on Xarelto to prevent stroke risk and metoprolol for rate control. She is not experiencing any chest discomfort, shortness of breath, or palpitations. Her last cardiology visit was on August 12, 2024.         IMMUNIZATIONS  She received RSV vaccine last year.      Past medical, surgical, family, and social history were reviewed and updated.     Medications:    Current Outpatient Medications:     omeprazole (PRILOSEC) 40 MG delayed-release capsule, Take 1 Capsule by mouth every day., Disp: 100 Capsule, Rfl: 0    levothyroxine (SYNTHROID) 88 MCG Tab, TAKE 1 TABLET BY MOUTH EVERY DAY IN THE MORNING ON AN EMPTY STOMACH, Disp: 100 Tablet, Rfl: 2    estradiol (ESTRACE) 0.1 MG/GM vaginal cream, INSERT 1 G INTO THE VAGINA EVERY DAY., Disp: 42.5 g, Rfl: 0    magnesium hydroxide (MILK OF MAGNESIA) 400 MG/5ML Suspension, Take 30 mL by mouth 1 time a day as needed. Pt uses as needed, Disp: , Rfl:     triamcinolone acetonide (KENALOG) 0.1 % Cream, Apply  topically 2 times a day., Disp: , Rfl:     gabapentin (NEURONTIN) 300 MG Cap, TAKE 1 CAPSULE BY MOUTH THREE TIMES A DAY, Disp: 270 Capsule, Rfl: 1     "metoprolol SR (TOPROL XL) 25 MG TABLET SR 24 HR, TAKE 1 TABLET BY MOUTH EVERY DAY, Disp: 100 Tablet, Rfl: 1    simvastatin (ZOCOR) 20 MG Tab, TAKE 1 TABLET BY MOUTH EVERY DAY, Disp: 100 Tablet, Rfl: 1    acetaminophen (TYLENOL) 500 MG Tab, Take 1,000 mg by mouth 3 times a day as needed for Moderate Pain., Disp: , Rfl:     diphenhydrAMINE (BENADRYL ALLERGY) 25 MG Tab, Take 25 mg by mouth at bedtime as needed for Sleep., Disp: , Rfl:     loratadine (CLARITIN) 10 MG Tab, Take 10 mg by mouth every day., Disp: , Rfl:     Polyethylene Glycol 3350 (MIRALAX PO), Take 17 g by mouth 1 time a day as needed (comnstipation). Twice weekly, Disp: , Rfl:     rivaroxaban (XARELTO) 20 MG Tab tablet, Take 1 Tablet by mouth with dinner., Disp: 100 Tablet, Rfl: 3    methocarbamol (ROBAXIN) 750 MG Tab, Take 750 mg by mouth 3 times a day., Disp: , Rfl:     D-MANNOSE PO, Take 2,100 mg by mouth every day., Disp: , Rfl:     GARLIC OIL PO, Take 1 tablet by mouth 2 times a day., Disp: , Rfl:     Omega-3 Fatty Acids (FISH OIL PO), Take 1 tablet by mouth., Disp: , Rfl:     Allergies:  Patient has no known allergies.      Health Maintenance: Completed    ROS:  Review of Systems   Constitutional:  Negative for chills and fever.   Respiratory:  Negative for shortness of breath.    Cardiovascular:  Negative for chest pain.   Neurological:  Negative for dizziness and headaches.       Objective:     Exam:  /78 (BP Location: Left arm, Patient Position: Sitting, BP Cuff Size: Adult)   Pulse 65   Temp 36.4 °C (97.6 °F) (Temporal)   Ht 1.676 m (5' 6\")   Wt 66.7 kg (147 lb)   SpO2 98%   BMI 23.73 kg/m²  Body mass index is 23.73 kg/m².    Physical Exam  Vitals reviewed.   Constitutional:       General: She is not in acute distress.  Eyes:      Extraocular Movements: Extraocular movements intact.   Cardiovascular:      Rate and Rhythm: Normal rate and regular rhythm.      Heart sounds: No murmur heard.     No friction rub. No gallop.   Pulmonary: "      Effort: Pulmonary effort is normal.      Breath sounds: No wheezing, rhonchi or rales.   Musculoskeletal:      Cervical back: Neck supple.   Skin:     General: Skin is warm and dry.   Neurological:      Mental Status: She is alert.   Psychiatric:         Mood and Affect: Mood normal.         Results  Imaging  DEXA shows osteopenia of the left forearm and normal bone density of the left hip.  No elevated fracture risk and no history of fractures.      Assessment & Plan:     1. Gastroesophageal reflux disease, unspecified whether esophagitis present  omeprazole (PRILOSEC) 40 MG delayed-release capsule      2. Postsurgical hypothyroidism  Comp Metabolic Panel    TSH WITH REFLEX TO FT4      3. PAF (paroxysmal atrial fibrillation) ([facility])        4. Dyslipidemia  Lipid Profile      5. Macrocytosis  CBC WITH DIFFERENTIAL      6. Screening for diabetes mellitus  HEMOGLOBIN A1C      7. Osteopenia, unspecified location  VITAMIN D,25 HYDROXY (DEFICIENCY)      8. Need for vaccination  INFLUENZA VACCINE, HIGH DOSE (65+ ONLY)          Assessment & Plan  1. Gastroesophageal Reflux Disease (GERD).  Chronic, stable.  The patient prefers to return to omeprazole 40 mg three times a week due to better symptom control.  A prescription for omeprazole 40 mg tablets will be sent to her pharmacy.    2. Hypothyroidism.  Chronic, stable.  Continue levothyroxine 88 mcg daily.    3. Atrial Fibrillation  She continues to follow up with her cardiologist and is on Xarelto for anticoagulation and metoprolol for rate control. She reports no chest discomfort, shortness of breath, or palpitations. Last saw cardiology on August 12, 2024.    Health Maintenance.  An influenza vaccine will be administered today. The new COVID-19 vaccine was recommended, and she was advised to get it at the pharmacy. Lab orders will be placed for December 2024.      Follow-up  Return in 6 months for follow-up.            Please note that this dictation was created using  voice recognition software. I have made every reasonable attempt to correct obvious errors, but I expect that there are errors of grammar and possibly content that I did not discover before finalizing the note.

## 2024-10-03 ENCOUNTER — TELEPHONE (OUTPATIENT)
Dept: CARDIOLOGY | Facility: MEDICAL CENTER | Age: 74
End: 2024-10-03
Payer: MEDICARE

## 2024-10-08 ENCOUNTER — TELEPHONE (OUTPATIENT)
Dept: HEALTH INFORMATION MANAGEMENT | Facility: OTHER | Age: 74
End: 2024-10-08
Payer: MEDICARE

## 2024-10-15 PROBLEM — Z98.890 PONV (POSTOPERATIVE NAUSEA AND VOMITING): Status: RESOLVED | Noted: 2024-02-07 | Resolved: 2024-10-15

## 2024-10-15 PROBLEM — R11.2 PONV (POSTOPERATIVE NAUSEA AND VOMITING): Status: RESOLVED | Noted: 2024-02-07 | Resolved: 2024-10-15

## 2024-10-16 NOTE — TELEPHONE ENCOUNTER
----- Message from Nadine Arellano MD sent at 8/19/2024  3:37 PM CDT -----  Patient was advised to follow up with endocrinology, can you check with him, what happened and he needs a follow up appt after 2 months with us, if no endocrinology   PT Informed and would like to r/s her appointment to a Virtual . Patient sent to [facility] for r/s

## 2024-10-21 DIAGNOSIS — I49.1 PAC (PREMATURE ATRIAL CONTRACTION): ICD-10-CM

## 2024-10-21 DIAGNOSIS — E78.5 DYSLIPIDEMIA: ICD-10-CM

## 2024-10-21 DIAGNOSIS — I48.0 PAROXYSMAL ATRIAL FIBRILLATION (HCC): ICD-10-CM

## 2024-10-21 DIAGNOSIS — I47.10 PAROXYSMAL SUPRAVENTRICULAR TACHYCARDIA (HCC): ICD-10-CM

## 2024-10-22 ENCOUNTER — APPOINTMENT (OUTPATIENT)
Dept: MEDICAL GROUP | Facility: MEDICAL CENTER | Age: 74
End: 2024-10-22
Payer: MEDICARE

## 2024-10-22 ENCOUNTER — TELEPHONE (OUTPATIENT)
Dept: CARDIOLOGY | Facility: MEDICAL CENTER | Age: 74
End: 2024-10-22

## 2024-10-22 DIAGNOSIS — E89.0 POSTSURGICAL HYPOTHYROIDISM: ICD-10-CM

## 2024-10-22 DIAGNOSIS — I47.10 PAROXYSMAL SUPRAVENTRICULAR TACHYCARDIA (HCC): ICD-10-CM

## 2024-10-22 DIAGNOSIS — K21.9 GASTROESOPHAGEAL REFLUX DISEASE, UNSPECIFIED WHETHER ESOPHAGITIS PRESENT: ICD-10-CM

## 2024-10-22 DIAGNOSIS — D68.69 SECONDARY HYPERCOAGULABLE STATE (HCC): ICD-10-CM

## 2024-10-22 DIAGNOSIS — I48.0 PAF (PAROXYSMAL ATRIAL FIBRILLATION) (HCC): ICD-10-CM

## 2024-10-22 DIAGNOSIS — E78.5 DYSLIPIDEMIA: ICD-10-CM

## 2024-12-02 NOTE — TELEPHONE ENCOUNTER
Received request via: Pharmacy    Was the patient seen in the last year in this department? Yes    Does the patient have an active prescription (recently filled or refills available) for medication(s) requested? No    Pharmacy Name:     Does the patient have longterm Plus and need 100-day supply? (This applies to ALL medications) Patient does not have SCP

## 2024-12-03 RX ORDER — SIMVASTATIN 20 MG
20 TABLET ORAL DAILY
Qty: 100 TABLET | Refills: 2 | Status: SHIPPED | OUTPATIENT
Start: 2024-12-03

## 2024-12-24 DIAGNOSIS — I47.10 PAROXYSMAL SUPRAVENTRICULAR TACHYCARDIA (HCC): ICD-10-CM

## 2024-12-24 DIAGNOSIS — D68.69 SECONDARY HYPERCOAGULABLE STATE (HCC): ICD-10-CM

## 2024-12-24 DIAGNOSIS — I49.1 PAC (PREMATURE ATRIAL CONTRACTION): ICD-10-CM

## 2024-12-24 RX ORDER — METOPROLOL SUCCINATE 25 MG/1
25 TABLET, EXTENDED RELEASE ORAL DAILY
Qty: 100 TABLET | Refills: 2 | Status: SHIPPED | OUTPATIENT
Start: 2024-12-24

## 2024-12-24 NOTE — TELEPHONE ENCOUNTER
Is the patient due for a refill? Yes    Was the patient seen the past year? Yes    Date of last office visit: 08.12.2024    Does the patient have an upcoming appointment?  No       Provider to refill:SW    Does the patient have [facility] Plus and need 100-day supply? (This applies to ALL medications) Yes, quantity updated to 100 days

## 2024-12-30 ENCOUNTER — APPOINTMENT (OUTPATIENT)
Dept: MEDICAL GROUP | Facility: PHYSICIAN GROUP | Age: 74
End: 2024-12-30
Payer: MEDICARE

## 2025-01-01 DIAGNOSIS — K21.9 GASTROESOPHAGEAL REFLUX DISEASE, UNSPECIFIED WHETHER ESOPHAGITIS PRESENT: ICD-10-CM

## 2025-01-02 ENCOUNTER — APPOINTMENT (OUTPATIENT)
Dept: MEDICAL GROUP | Facility: PHYSICIAN GROUP | Age: 75
End: 2025-01-02
Payer: MEDICARE

## 2025-01-03 RX ORDER — OMEPRAZOLE 40 MG/1
40 CAPSULE, DELAYED RELEASE ORAL DAILY
Qty: 100 CAPSULE | Refills: 0 | Status: SHIPPED | OUTPATIENT
Start: 2025-01-03

## 2025-01-03 NOTE — TELEPHONE ENCOUNTER
Received request via: Pharmacy    Was the patient seen in the last year in this department? No    Does the patient have an active prescription (recently filled or refills available) for medication(s) requested? No    [facility]    Does the patient have [facility] Plus and need 100-day supply? (This applies to ALL medications) Yes, quantity updated to 100 days

## 2025-01-13 ENCOUNTER — HOSPITAL ENCOUNTER (OUTPATIENT)
Dept: LAB | Facility: MEDICAL CENTER | Age: 75
End: 2025-01-13
Attending: STUDENT IN AN ORGANIZED HEALTH CARE EDUCATION/TRAINING PROGRAM
Payer: MEDICARE

## 2025-01-13 DIAGNOSIS — E89.0 POSTSURGICAL HYPOTHYROIDISM: ICD-10-CM

## 2025-01-13 DIAGNOSIS — Z13.1 SCREENING FOR DIABETES MELLITUS: ICD-10-CM

## 2025-01-13 DIAGNOSIS — D75.89 MACROCYTOSIS: ICD-10-CM

## 2025-01-13 DIAGNOSIS — G89.29 CHRONIC BILATERAL LOW BACK PAIN WITH BILATERAL SCIATICA: ICD-10-CM

## 2025-01-13 DIAGNOSIS — M54.41 CHRONIC BILATERAL LOW BACK PAIN WITH BILATERAL SCIATICA: ICD-10-CM

## 2025-01-13 DIAGNOSIS — M54.42 CHRONIC BILATERAL LOW BACK PAIN WITH BILATERAL SCIATICA: ICD-10-CM

## 2025-01-13 DIAGNOSIS — E78.5 DYSLIPIDEMIA: ICD-10-CM

## 2025-01-13 DIAGNOSIS — M85.80 OSTEOPENIA, UNSPECIFIED LOCATION: ICD-10-CM

## 2025-01-13 LAB
25(OH)D3 SERPL-MCNC: 47 NG/ML (ref 30–100)
ALBUMIN SERPL BCP-MCNC: 4.4 G/DL (ref 3.2–4.9)
ALBUMIN/GLOB SERPL: 1.8 G/DL
ALP SERPL-CCNC: 113 U/L (ref 30–99)
ALT SERPL-CCNC: 20 U/L (ref 2–50)
ANION GAP SERPL CALC-SCNC: 12 MMOL/L (ref 7–16)
AST SERPL-CCNC: 24 U/L (ref 12–45)
BASOPHILS # BLD AUTO: 1.1 % (ref 0–1.8)
BASOPHILS # BLD: 0.05 K/UL (ref 0–0.12)
BILIRUB SERPL-MCNC: 0.6 MG/DL (ref 0.1–1.5)
BUN SERPL-MCNC: 14 MG/DL (ref 8–22)
CALCIUM ALBUM COR SERPL-MCNC: 9.2 MG/DL (ref 8.5–10.5)
CALCIUM SERPL-MCNC: 9.5 MG/DL (ref 8.5–10.5)
CHLORIDE SERPL-SCNC: 104 MMOL/L (ref 96–112)
CHOLEST SERPL-MCNC: 169 MG/DL (ref 100–199)
CO2 SERPL-SCNC: 24 MMOL/L (ref 20–33)
CREAT SERPL-MCNC: 0.69 MG/DL (ref 0.5–1.4)
EOSINOPHIL # BLD AUTO: 0.15 K/UL (ref 0–0.51)
EOSINOPHIL NFR BLD: 3.2 % (ref 0–6.9)
ERYTHROCYTE [DISTWIDTH] IN BLOOD BY AUTOMATED COUNT: 46.8 FL (ref 35.9–50)
EST. AVERAGE GLUCOSE BLD GHB EST-MCNC: 105 MG/DL
FASTING STATUS PATIENT QL REPORTED: NORMAL
GFR SERPLBLD CREATININE-BSD FMLA CKD-EPI: 91 ML/MIN/1.73 M 2
GLOBULIN SER CALC-MCNC: 2.5 G/DL (ref 1.9–3.5)
GLUCOSE SERPL-MCNC: 97 MG/DL (ref 65–99)
HBA1C MFR BLD: 5.3 % (ref 4–5.6)
HCT VFR BLD AUTO: 45.2 % (ref 37–47)
HDLC SERPL-MCNC: 70 MG/DL
HGB BLD-MCNC: 15.2 G/DL (ref 12–16)
IMM GRANULOCYTES # BLD AUTO: 0.01 K/UL (ref 0–0.11)
IMM GRANULOCYTES NFR BLD AUTO: 0.2 % (ref 0–0.9)
LDLC SERPL CALC-MCNC: 77 MG/DL
LYMPHOCYTES # BLD AUTO: 1.84 K/UL (ref 1–4.8)
LYMPHOCYTES NFR BLD: 39.2 % (ref 22–41)
MCH RBC QN AUTO: 33.1 PG (ref 27–33)
MCHC RBC AUTO-ENTMCNC: 33.6 G/DL (ref 32.2–35.5)
MCV RBC AUTO: 98.5 FL (ref 81.4–97.8)
MONOCYTES # BLD AUTO: 0.59 K/UL (ref 0–0.85)
MONOCYTES NFR BLD AUTO: 12.6 % (ref 0–13.4)
NEUTROPHILS # BLD AUTO: 2.05 K/UL (ref 1.82–7.42)
NEUTROPHILS NFR BLD: 43.7 % (ref 44–72)
NRBC # BLD AUTO: 0 K/UL
NRBC BLD-RTO: 0 /100 WBC (ref 0–0.2)
PLATELET # BLD AUTO: 287 K/UL (ref 164–446)
PMV BLD AUTO: 10.3 FL (ref 9–12.9)
POTASSIUM SERPL-SCNC: 4.2 MMOL/L (ref 3.6–5.5)
PROT SERPL-MCNC: 6.9 G/DL (ref 6–8.2)
RBC # BLD AUTO: 4.59 M/UL (ref 4.2–5.4)
SODIUM SERPL-SCNC: 140 MMOL/L (ref 135–145)
TRIGL SERPL-MCNC: 111 MG/DL (ref 0–149)
TSH SERPL DL<=0.005 MIU/L-ACNC: 1.1 UIU/ML (ref 0.38–5.33)
WBC # BLD AUTO: 4.7 K/UL (ref 4.8–10.8)

## 2025-01-13 PROCEDURE — 80061 LIPID PANEL: CPT

## 2025-01-13 PROCEDURE — 36415 COLL VENOUS BLD VENIPUNCTURE: CPT

## 2025-01-13 PROCEDURE — 82306 VITAMIN D 25 HYDROXY: CPT

## 2025-01-13 PROCEDURE — 80053 COMPREHEN METABOLIC PANEL: CPT

## 2025-01-13 PROCEDURE — 85025 COMPLETE CBC W/AUTO DIFF WBC: CPT

## 2025-01-13 PROCEDURE — 83036 HEMOGLOBIN GLYCOSYLATED A1C: CPT

## 2025-01-13 PROCEDURE — 84443 ASSAY THYROID STIM HORMONE: CPT

## 2025-01-14 RX ORDER — GABAPENTIN 300 MG/1
300 CAPSULE ORAL 3 TIMES DAILY
Qty: 270 CAPSULE | Refills: 0 | Status: SHIPPED | OUTPATIENT
Start: 2025-01-14

## 2025-01-14 NOTE — TELEPHONE ENCOUNTER
Received request via: Pharmacy    Was the patient seen in the last year in this department? Yes    Does the patient have an active prescription (recently filled or refills available) for medication(s) requested? No    [facility]    Does the patient have [facility] Plus and need 100-day supply? (This applies to ALL medications) Yes, quantity updated to 100 days

## 2025-01-20 DIAGNOSIS — N89.8 VAGINAL DRYNESS: ICD-10-CM

## 2025-01-21 RX ORDER — ESTRADIOL 0.1 MG/G
1 CREAM VAGINAL DAILY
Qty: 42.5 G | Refills: 0 | Status: SHIPPED | OUTPATIENT
Start: 2025-01-21

## 2025-01-21 NOTE — TELEPHONE ENCOUNTER
Received request via: Patient    Was the patient seen in the last year in this department? Yes    Does the patient have an active prescription (recently filled or refills available) for medication(s) requested? No    Pharmacy Name: [facility]/pharmacy #[id number] - [place] - [id number]     Does the patient have longterm Plus and need 100-day supply? (This applies to ALL medications) Yes, quantity updated to 100 days

## 2025-02-18 ENCOUNTER — APPOINTMENT (OUTPATIENT)
Dept: MEDICAL GROUP | Facility: PHYSICIAN GROUP | Age: 75
End: 2025-02-18
Payer: MEDICARE

## 2025-02-18 VITALS
OXYGEN SATURATION: 97 % | WEIGHT: 150 LBS | HEIGHT: 66 IN | DIASTOLIC BLOOD PRESSURE: 84 MMHG | BODY MASS INDEX: 24.11 KG/M2 | TEMPERATURE: 97.4 F | HEART RATE: 84 BPM | SYSTOLIC BLOOD PRESSURE: 110 MMHG

## 2025-02-18 DIAGNOSIS — E89.0 POSTSURGICAL HYPOTHYROIDISM: ICD-10-CM

## 2025-02-18 DIAGNOSIS — R74.8 ELEVATED ALKALINE PHOSPHATASE LEVEL: ICD-10-CM

## 2025-02-18 DIAGNOSIS — E78.5 DYSLIPIDEMIA: ICD-10-CM

## 2025-02-18 DIAGNOSIS — D75.89 MACROCYTOSIS: ICD-10-CM

## 2025-02-18 DIAGNOSIS — I48.0 PAF (PAROXYSMAL ATRIAL FIBRILLATION) (HCC): ICD-10-CM

## 2025-02-18 PROCEDURE — 3074F SYST BP LT 130 MM HG: CPT | Performed by: STUDENT IN AN ORGANIZED HEALTH CARE EDUCATION/TRAINING PROGRAM

## 2025-02-18 PROCEDURE — 99214 OFFICE O/P EST MOD 30 MIN: CPT | Performed by: STUDENT IN AN ORGANIZED HEALTH CARE EDUCATION/TRAINING PROGRAM

## 2025-02-18 PROCEDURE — 3079F DIAST BP 80-89 MM HG: CPT | Performed by: STUDENT IN AN ORGANIZED HEALTH CARE EDUCATION/TRAINING PROGRAM

## 2025-02-18 ASSESSMENT — ENCOUNTER SYMPTOMS
FEVER: 0
SHORTNESS OF BREATH: 0
DIZZINESS: 0
HEADACHES: 0
CHILLS: 0

## 2025-02-18 ASSESSMENT — FIBROSIS 4 INDEX: FIB4 SCORE: 1.38

## 2025-02-18 ASSESSMENT — PATIENT HEALTH QUESTIONNAIRE - PHQ9: CLINICAL INTERPRETATION OF PHQ2 SCORE: 0

## 2025-02-18 NOTE — PROGRESS NOTES
Verbal consent was acquired by the patient to use [employer] ambient listening note generation during this visit.    Subjective:     HPI:   History of Present Illness  The patient presents for follow up.    Continues to follow with pain management at [facility] for chronic back pain.  Underwent a radiofrequency ablation around November.  Reports mild improvement in her pain since that procedure.     Continues to follow with cardiology for management of paroxysmal atrial fibrillation, on metoprolol 25 mg daily, as well as Xarelto 20 mg daily for anticoagulation.    We are going over her lab results today.        Past medical, surgical, family, and social history were reviewed and updated.     Medications:    Current Outpatient Medications:     estradiol (ESTRACE) 0.1 MG/GM vaginal cream, INSERT 1 G INTO THE VAGINA EVERY DAY., Disp: 42.5 g, Rfl: 0    gabapentin (NEURONTIN) 300 MG Cap, TAKE 1 CAPSULE BY MOUTH THREE TIMES A DAY, Disp: 270 Capsule, Rfl: 0    omeprazole (PRILOSEC) 40 MG delayed-release capsule, TAKE 1 CAPSULE BY MOUTH EVERY DAY, Disp: 100 Capsule, Rfl: 0    metoprolol SR (TOPROL XL) 25 MG TABLET SR 24 HR, TAKE 1 TABLET BY MOUTH EVERY DAY, Disp: 100 Tablet, Rfl: 2    simvastatin (ZOCOR) 20 MG Tab, TAKE 1 TABLET BY MOUTH EVERY DAY, Disp: 100 Tablet, Rfl: 2    rivaroxaban (XARELTO) 20 MG Tab tablet, Take 1 Tablet by mouth with dinner., Disp: 100 Tablet, Rfl: 2    levothyroxine (SYNTHROID) 88 MCG Tab, TAKE 1 TABLET BY MOUTH EVERY DAY IN THE MORNING ON AN EMPTY STOMACH, Disp: 100 Tablet, Rfl: 2    magnesium hydroxide (MILK OF MAGNESIA) 400 MG/5ML Suspension, Take 30 mL by mouth 1 time a day as needed. Pt uses as needed, Disp: , Rfl:     triamcinolone acetonide (KENALOG) 0.1 % Cream, Apply  topically 2 times a day., Disp: , Rfl:     acetaminophen (TYLENOL) 500 MG Tab, Take 1,000 mg by mouth 3 times a day as needed for Moderate Pain., Disp: , Rfl:     diphenhydrAMINE (BENADRYL ALLERGY) 25 MG Tab, Take  "25 mg by mouth at bedtime as needed for Sleep., Disp: , Rfl:     loratadine (CLARITIN) 10 MG Tab, Take 10 mg by mouth every day., Disp: , Rfl:     methocarbamol (ROBAXIN) 750 MG Tab, Take 750 mg by mouth 3 times a day., Disp: , Rfl:     Omega-3 Fatty Acids (FISH OIL PO), Take 1 tablet by mouth., Disp: , Rfl:     Allergies:  Patient has no known allergies.      Health Maintenance: Completed    ROS:  Review of Systems   Constitutional:  Negative for chills and fever.   Respiratory:  Negative for shortness of breath.    Cardiovascular:  Negative for chest pain.   Neurological:  Negative for dizziness and headaches.       Objective:     Exam:  /84 (BP Location: Left arm, Patient Position: Sitting, BP Cuff Size: Adult)   Pulse 84   Temp 36.3 °C (97.4 °F) (Temporal)   Ht 1.676 m (5' 6\")   Wt 68 kg (150 lb)   SpO2 97%   BMI 24.21 kg/m²  Body mass index is 24.21 kg/m².    Physical Exam  Vitals reviewed.   Constitutional:       General: She is not in acute distress.  Eyes:      Extraocular Movements: Extraocular movements intact.   Cardiovascular:      Rate and Rhythm: Normal rate and regular rhythm.      Heart sounds: No murmur heard.     No friction rub. No gallop.   Pulmonary:      Effort: Pulmonary effort is normal.      Breath sounds: No wheezing, rhonchi or rales.   Musculoskeletal:      Cervical back: Neck supple.   Skin:     General: Skin is warm and dry.   Neurological:      Mental Status: She is alert.   Psychiatric:         Mood and Affect: Mood normal.         Results  - Laboratory Studies:    Results for orders placed or performed during the hospital encounter of 01/13/25   VITAMIN D,25 HYDROXY (DEFICIENCY)    Collection Time: 01/13/25 11:05 AM   Result Value Ref Range    25-Hydroxy   Vitamin D 25 47 30 - 100 ng/mL   TSH WITH REFLEX TO FT4    Collection Time: 01/13/25 11:05 AM   Result Value Ref Range    TSH 1.100 0.380 - 5.330 uIU/mL   Lipid Profile    Collection Time: 01/13/25 11:05 AM   Result " Value Ref Range    Cholesterol,Tot 169 100 - 199 mg/dL    Triglycerides 111 0 - 149 mg/dL    HDL 70 >=40 mg/dL    LDL 77 <100 mg/dL   HEMOGLOBIN A1C    Collection Time: 01/13/25 11:05 AM   Result Value Ref Range    Glycohemoglobin 5.3 4.0 - 5.6 %    Est Avg Glucose 105 mg/dL   CBC WITH DIFFERENTIAL    Collection Time: 01/13/25 11:05 AM   Result Value Ref Range    WBC 4.7 (L) 4.8 - 10.8 K/uL    RBC 4.59 4.20 - 5.40 M/uL    Hemoglobin 15.2 12.0 - 16.0 g/dL    Hematocrit 45.2 37.0 - 47.0 %    MCV 98.5 (H) 81.4 - 97.8 fL    MCH 33.1 (H) 27.0 - 33.0 pg    MCHC 33.6 32.2 - 35.5 g/dL    RDW 46.8 35.9 - 50.0 fL    Platelet Count 287 164 - 446 K/uL    MPV 10.3 9.0 - 12.9 fL    Neutrophils-Polys 43.70 (L) 44.00 - 72.00 %    Lymphocytes 39.20 22.00 - 41.00 %    Monocytes 12.60 0.00 - 13.40 %    Eosinophils 3.20 0.00 - 6.90 %    Basophils 1.10 0.00 - 1.80 %    Immature Granulocytes 0.20 0.00 - 0.90 %    Nucleated RBC 0.00 0.00 - 0.20 /100 WBC    Neutrophils (Absolute) 2.05 1.82 - 7.42 K/uL    Lymphs (Absolute) 1.84 1.00 - 4.80 K/uL    Monos (Absolute) 0.59 0.00 - 0.85 K/uL    Eos (Absolute) 0.15 0.00 - 0.51 K/uL    Baso (Absolute) 0.05 0.00 - 0.12 K/uL    Immature Granulocytes (abs) 0.01 0.00 - 0.11 K/uL    NRBC (Absolute) 0.00 K/uL   Comp Metabolic Panel    Collection Time: 01/13/25 11:05 AM   Result Value Ref Range    Sodium 140 135 - 145 mmol/L    Potassium 4.2 3.6 - 5.5 mmol/L    Chloride 104 96 - 112 mmol/L    Co2 24 20 - 33 mmol/L    Anion Gap 12.0 7.0 - 16.0    Glucose 97 65 - 99 mg/dL    Bun 14 8 - 22 mg/dL    Creatinine 0.69 0.50 - 1.40 mg/dL    Calcium 9.5 8.5 - 10.5 mg/dL    Correct Calcium 9.2 8.5 - 10.5 mg/dL    AST(SGOT) 24 12 - 45 U/L    ALT(SGPT) 20 2 - 50 U/L    Alkaline Phosphatase 113 (H) 30 - 99 U/L    Total Bilirubin 0.6 0.1 - 1.5 mg/dL    Albumin 4.4 3.2 - 4.9 g/dL    Total Protein 6.9 6.0 - 8.2 g/dL    Globulin 2.5 1.9 - 3.5 g/dL    A-G Ratio 1.8 g/dL   FASTING STATUS    Collection Time: 01/13/25 11:05 AM    Result Value Ref Range    Fasting Status Fasting    ESTIMATED GFR    Collection Time: 01/13/25 11:05 AM   Result Value Ref Range    GFR (CKD-EPI) 91 >60 mL/min/1.73 m 2         Assessment & Plan:     1. Postsurgical hypothyroidism        2. Dyslipidemia        3. Elevated alkaline phosphatase level  ALKALINE PHOSPHATASE    GAMMA GT (GGT)    5' NUCLEOTIDASE      4. Macrocytosis  VITAMIN B12    FOLATE      5. PAF (paroxysmal atrial fibrillation) (HCC)            Assessment & Plan  Hypothyroidism  Chronic, controlled. Patient is taking levothyroxine and tolerating it well. TSH is within normal limits. Continue levothyroxine 88 mcg daily.    Dyslipidemia  Chronic, controlled. She is on a statin for primary prevention and tolerating it well. The last cholesterol panel was within normal limits. Continue simvastatin 20 mg daily.    Elevated alkaline phosphatase.  Noted on recent labs. Recheck in 3 months.     Macrocytosis.  Noted on labs. Will check B12 and folate on next labs.        HCC Gap Form    Diagnosis to address: I48.0 - PAF (paroxysmal atrial fibrillation) (HCC)  Assessment and plan: Chronic, stable, as based on today's assessment and impact on other conditions evaluated today. Continue with current treatment plan: Current medication regimen per cardiology. Follow-up with specialist as directed, but at least annually.  Last edited 02/18/25 15:41 PST by HUMBERTO [email or web address].           Follow-up  The patient will follow up in 6 months.      Please note that this dictation was created using voice recognition software. I have made every reasonable attempt to correct obvious errors, but I expect that there are errors of grammar and possibly content that I did not discover before finalizing the note.

## 2025-03-14 ENCOUNTER — HOSPITAL ENCOUNTER (OUTPATIENT)
Dept: RADIOLOGY | Facility: MEDICAL CENTER | Age: 75
End: 2025-03-14
Attending: STUDENT IN AN ORGANIZED HEALTH CARE EDUCATION/TRAINING PROGRAM
Payer: MEDICARE

## 2025-03-14 DIAGNOSIS — Z12.31 VISIT FOR SCREENING MAMMOGRAM: ICD-10-CM

## 2025-03-14 PROCEDURE — 77067 SCR MAMMO BI INCL CAD: CPT

## 2025-03-18 ENCOUNTER — RESULTS FOLLOW-UP (OUTPATIENT)
Dept: MEDICAL GROUP | Facility: PHYSICIAN GROUP | Age: 75
End: 2025-03-18
Payer: MEDICARE

## 2025-03-20 ENCOUNTER — PATIENT MESSAGE (OUTPATIENT)
Dept: HEALTH INFORMATION MANAGEMENT | Facility: OTHER | Age: 75
End: 2025-03-20

## 2025-04-09 ENCOUNTER — APPOINTMENT (OUTPATIENT)
Dept: URBAN - METROPOLITAN AREA CLINIC 20 | Facility: CLINIC | Age: 75
Setting detail: DERMATOLOGY
End: 2025-04-09

## 2025-04-09 DIAGNOSIS — D22 MELANOCYTIC NEVI: ICD-10-CM

## 2025-04-09 DIAGNOSIS — L82.1 OTHER SEBORRHEIC KERATOSIS: ICD-10-CM

## 2025-04-09 DIAGNOSIS — L82.0 INFLAMED SEBORRHEIC KERATOSIS: ICD-10-CM

## 2025-04-09 DIAGNOSIS — L81.4 OTHER MELANIN HYPERPIGMENTATION: ICD-10-CM

## 2025-04-09 DIAGNOSIS — D18.0 HEMANGIOMA: ICD-10-CM

## 2025-04-09 DIAGNOSIS — L57.8 OTHER SKIN CHANGES DUE TO CHRONIC EXPOSURE TO NONIONIZING RADIATION: ICD-10-CM

## 2025-04-09 PROBLEM — D18.01 HEMANGIOMA OF SKIN AND SUBCUTANEOUS TISSUE: Status: ACTIVE | Noted: 2025-04-09

## 2025-04-09 PROBLEM — D22.5 MELANOCYTIC NEVI OF TRUNK: Status: ACTIVE | Noted: 2025-04-09

## 2025-04-09 PROCEDURE — 17110 DESTRUCTION B9 LES UP TO 14: CPT

## 2025-04-09 PROCEDURE — 99213 OFFICE O/P EST LOW 20 MIN: CPT | Mod: 25

## 2025-04-09 PROCEDURE — ? COUNSELING

## 2025-04-09 PROCEDURE — ? LIQUID NITROGEN

## 2025-04-09 ASSESSMENT — LOCATION ZONE DERM
LOCATION ZONE: LEG
LOCATION ZONE: NECK
LOCATION ZONE: TRUNK
LOCATION ZONE: ARM
LOCATION ZONE: SCALP
LOCATION ZONE: FACE

## 2025-04-09 ASSESSMENT — LOCATION SIMPLE DESCRIPTION DERM
LOCATION SIMPLE: ANTERIOR SCALP
LOCATION SIMPLE: RIGHT FOREHEAD
LOCATION SIMPLE: RIGHT FOREARM
LOCATION SIMPLE: LEFT BREAST
LOCATION SIMPLE: LEFT PRETIBIAL REGION
LOCATION SIMPLE: LEFT THIGH
LOCATION SIMPLE: LEFT ANTERIOR NECK
LOCATION SIMPLE: CHEST
LOCATION SIMPLE: RIGHT CHEEK
LOCATION SIMPLE: RIGHT UPPER ARM
LOCATION SIMPLE: RIGHT UPPER BACK
LOCATION SIMPLE: LEFT UPPER BACK
LOCATION SIMPLE: RIGHT TEMPLE
LOCATION SIMPLE: LEFT CHEEK
LOCATION SIMPLE: RIGHT PRETIBIAL REGION
LOCATION SIMPLE: LEFT FOREARM
LOCATION SIMPLE: LEFT UPPER ARM
LOCATION SIMPLE: RIGHT THIGH

## 2025-04-09 ASSESSMENT — LOCATION DETAILED DESCRIPTION DERM
LOCATION DETAILED: MID-FRONTAL SCALP
LOCATION DETAILED: LEFT MID-UPPER BACK
LOCATION DETAILED: RIGHT ANTERIOR DISTAL THIGH
LOCATION DETAILED: RIGHT MID TEMPLE
LOCATION DETAILED: RIGHT PROXIMAL PRETIBIAL REGION
LOCATION DETAILED: LEFT DISTAL DORSAL FOREARM
LOCATION DETAILED: RIGHT INFERIOR FOREHEAD
LOCATION DETAILED: RIGHT INFERIOR UPPER BACK
LOCATION DETAILED: LEFT ANTERIOR DISTAL THIGH
LOCATION DETAILED: LEFT MEDIAL BREAST 7-8:00 REGION
LOCATION DETAILED: LEFT ANTERIOR PROXIMAL UPPER ARM
LOCATION DETAILED: RIGHT MID-UPPER BACK
LOCATION DETAILED: LEFT INFERIOR UPPER BACK
LOCATION DETAILED: RIGHT CENTRAL MALAR CHEEK
LOCATION DETAILED: LEFT INFERIOR CENTRAL MALAR CHEEK
LOCATION DETAILED: LEFT MEDIAL SUPERIOR CHEST
LOCATION DETAILED: RIGHT ANTERIOR PROXIMAL UPPER ARM
LOCATION DETAILED: RIGHT DISTAL DORSAL FOREARM
LOCATION DETAILED: LEFT ANTERIOR PROXIMAL THIGH
LOCATION DETAILED: LEFT DISTAL PRETIBIAL REGION
LOCATION DETAILED: RIGHT SUPERIOR MEDIAL UPPER BACK
LOCATION DETAILED: RIGHT LATERAL MALAR CHEEK
LOCATION DETAILED: LEFT INFERIOR LATERAL NECK

## 2025-04-11 DIAGNOSIS — K21.9 GASTROESOPHAGEAL REFLUX DISEASE, UNSPECIFIED WHETHER ESOPHAGITIS PRESENT: ICD-10-CM

## 2025-04-11 RX ORDER — OMEPRAZOLE 40 MG/1
40 CAPSULE, DELAYED RELEASE ORAL DAILY
Qty: 100 CAPSULE | Refills: 3 | Status: SHIPPED | OUTPATIENT
Start: 2025-04-11

## 2025-04-11 NOTE — TELEPHONE ENCOUNTER
Received request via: Pharmacy    Was the patient seen in the last year in this department? Yes    Does the patient have an active prescription (recently filled or refills available) for medication(s) requested? No    Pharm[facility] Name: [facility]    Does the patient have residential Plus and need 100-day supply? (This applies to ALL medications) Yes, quantity updated to 100 days

## 2025-04-15 NOTE — ASSESSMENT & PLAN NOTE
Chronic, stable. Reviewed lipid profile from January 2025. Currently taking simvastatin 20 mg daily. Provided education on heart healthy diet with adequate intake of fruits, vegetables, and whole grains. Encourage 30 minutes of moderate exercise 3-4 times a week. Denies chest pain and claudication. Routinely monitored by primary care provider.

## 2025-04-15 NOTE — ASSESSMENT & PLAN NOTE
Chronic, stable. Secondary to thyroid disregulation from thyroid cancer. Currently taking metoprolol SR 25 mg daily and rivaroxaban 20 mg daily. Reports intermittent episodes of palpitations and shortness of breath. Routinely monitored by cardiology.

## 2025-04-15 NOTE — ASSESSMENT & PLAN NOTE
Chronic, stable. Reports history of partial thyroidectomy in 1990 and 1993 with radioactive iodine therapy. Currently taking levothyroxine 88 mcg daily as prescribed. Denies fatigue, palpitations, hair and skin changes, temperature intolerance, and weight loss or weight gain. Managed by primary care provider.

## 2025-04-15 NOTE — ASSESSMENT & PLAN NOTE
Chronic, stable. Continues on metoprolol SR 25 mg daily. Reports occasional palpitations. Routinely monitored by cardiology.

## 2025-04-15 NOTE — ASSESSMENT & PLAN NOTE
Chronic, ongoing. Currently taking omeprazole 40 mg 3 times a week. Denies specific dietary triggers. Reports that she elevates the head of her bed if she eats or drinks after 6pm. Denies early satiety, unintentional weight loss, belching, and persistent burning pain in chest or upper abdomen. Followed by primary care provider.

## 2025-04-21 ENCOUNTER — APPOINTMENT (OUTPATIENT)
Dept: MEDICAL GROUP | Facility: MEDICAL CENTER | Age: 75
End: 2025-04-21
Attending: FAMILY MEDICINE
Payer: MEDICARE

## 2025-04-21 VITALS
HEART RATE: 76 BPM | HEIGHT: 66 IN | WEIGHT: 152.2 LBS | SYSTOLIC BLOOD PRESSURE: 122 MMHG | DIASTOLIC BLOOD PRESSURE: 76 MMHG | BODY MASS INDEX: 24.46 KG/M2 | OXYGEN SATURATION: 95 % | TEMPERATURE: 97.5 F

## 2025-04-21 DIAGNOSIS — D68.69 SECONDARY HYPERCOAGULABLE STATE (HCC): ICD-10-CM

## 2025-04-21 DIAGNOSIS — E78.5 DYSLIPIDEMIA: ICD-10-CM

## 2025-04-21 DIAGNOSIS — K21.9 GASTROESOPHAGEAL REFLUX DISEASE, UNSPECIFIED WHETHER ESOPHAGITIS PRESENT: ICD-10-CM

## 2025-04-21 DIAGNOSIS — I47.10 PAROXYSMAL SUPRAVENTRICULAR TACHYCARDIA (HCC): ICD-10-CM

## 2025-04-21 DIAGNOSIS — I48.0 PAF (PAROXYSMAL ATRIAL FIBRILLATION) (HCC): ICD-10-CM

## 2025-04-21 DIAGNOSIS — E89.0 POSTSURGICAL HYPOTHYROIDISM: ICD-10-CM

## 2025-04-21 PROCEDURE — 1125F AMNT PAIN NOTED PAIN PRSNT: CPT

## 2025-04-21 PROCEDURE — 3074F SYST BP LT 130 MM HG: CPT

## 2025-04-21 PROCEDURE — G0439 PPPS, SUBSEQ VISIT: HCPCS

## 2025-04-21 PROCEDURE — 3078F DIAST BP <80 MM HG: CPT

## 2025-04-21 SDOH — ECONOMIC STABILITY: TRANSPORTATION INSECURITY: IN THE PAST 12 MONTHS, HAS LACK OF TRANSPORTATION KEPT YOU FROM MEDICAL APPOINTMENTS OR FROM GETTING MEDICATIONS?: NO

## 2025-04-21 SDOH — ECONOMIC STABILITY: FOOD INSECURITY: WITHIN THE PAST 12 MONTHS, THE FOOD YOU BOUGHT JUST DIDN'T LAST AND YOU DIDN'T HAVE MONEY TO GET MORE.: NEVER TRUE

## 2025-04-21 SDOH — ECONOMIC STABILITY: FOOD INSECURITY: WITHIN THE PAST 12 MONTHS, YOU WORRIED THAT YOUR FOOD WOULD RUN OUT BEFORE YOU GOT THE MONEY TO BUY MORE.: NEVER TRUE

## 2025-04-21 SDOH — ECONOMIC STABILITY: FOOD INSECURITY: HOW HARD IS IT FOR YOU TO PAY FOR THE VERY BASICS LIKE FOOD, HOUSING, MEDICAL CARE, AND HEATING?: NOT HARD AT ALL

## 2025-04-21 ASSESSMENT — ENCOUNTER SYMPTOMS: GENERAL WELL-BEING: FAIR

## 2025-04-21 ASSESSMENT — ACTIVITIES OF DAILY LIVING (ADL)
LACK_OF_TRANSPORTATION: NO
BATHING_REQUIRES_ASSISTANCE: 0

## 2025-04-21 ASSESSMENT — PAIN SCALES - GENERAL: PAINLEVEL_OUTOF10: 2=MINIMAL-SLIGHT

## 2025-04-21 ASSESSMENT — FIBROSIS 4 INDEX: FIB4 SCORE: 1.38

## 2025-04-21 ASSESSMENT — PATIENT HEALTH QUESTIONNAIRE - PHQ9: CLINICAL INTERPRETATION OF PHQ2 SCORE: 0

## 2025-04-21 NOTE — PROGRESS NOTES
Comprehensive Health Assessment Program     Argelia Jimenez is a 74 y.o. here for her comprehensive health assessment.    Patient Active Problem List    Diagnosis Date Noted    Other constipation 06/14/2024    KEON (stress urinary incontinence, female) 02/07/2024    PAF (paroxysmal atrial fibrillation) ([facility]) 11/03/2023    Incomplete uterovaginal prolapse 06/22/2023    Cystocele, lateral 06/22/2023    Rectocele 06/22/2023    Urinary incontinence, mixed 06/22/2023    Atrophic vaginitis 06/22/2023    Palpitations 04/25/2023    Anticoagulated 04/25/2023    Secondary hypercoagulable state ([facility]) 08/09/2022    BMI 24.0-24.9, adult 08/09/2022    Degenerative disc disease, lumbar 08/09/2022    Dyslipidemia 07/26/2019    PAC (premature atrial contraction)     PSVT (paroxysmal supraventricular tachycardia) ([facility])     History of thyroid cancer     Postsurgical hypothyroidism     GERD (gastroesophageal reflux disease)     Insomnia        Current Outpatient Medications   Medication Sig Dispense Refill    omeprazole (PRILOSEC) 40 MG delayed-release capsule TAKE 1 CAPSULE BY MOUTH EVERY  Capsule 3    estradiol (ESTRACE) 0.1 MG/GM vaginal cream INSERT 1 G INTO THE VAGINA EVERY DAY. 42.5 g 0    gabapentin (NEURONTIN) 300 MG Cap TAKE 1 CAPSULE BY MOUTH THREE TIMES A  Capsule 0    metoprolol SR (TOPROL XL) 25 MG TABLET SR 24 HR TAKE 1 TABLET BY MOUTH EVERY  Tablet 2    simvastatin (ZOCOR) 20 MG Tab TAKE 1 TABLET BY MOUTH EVERY  Tablet 2    rivaroxaban (XARELTO) 20 MG Tab tablet Take 1 Tablet by mouth with dinner. 100 Tablet 2    levothyroxine (SYNTHROID) 88 MCG Tab TAKE 1 TABLET BY MOUTH EVERY DAY IN THE MORNING ON AN EMPTY STOMACH 100 Tablet 2    magnesium hydroxide (MILK OF MAGNESIA) 400 MG/5ML Suspension Take 30 mL by mouth 1 time a day as needed. Pt uses as needed      triamcinolone acetonide (KENALOG) 0.1 % Cream Apply  topically 2 times a day.      acetaminophen (TYLENOL) 500 MG Tab Take 1,000  mg by mouth 3 times a day as needed for Moderate Pain.      diphenhydrAMINE (BENADRYL ALLERGY) 25 MG Tab Take 25 mg by mouth at bedtime as needed for Sleep.      loratadine (CLARITIN) 10 MG Tab Take 10 mg by mouth every day.      methocarbamol (ROBAXIN) 750 MG Tab Take 750 mg by mouth 3 times a day.      Omega-3 Fatty Acids (FISH OIL PO) Take 1 tablet by mouth.       No current facility-administered medications for this visit.          Current supplements as per medication list.     Allergies:   Patient has no known allergies.  Social History     Tobacco Use    Smoking status: Never    Smokeless tobacco: Never   Vaping Use    Vaping status: Never Used   Substance Use Topics    Alcohol use: Yes     Alcohol/week: 1.2 - 3.6 oz     Types: 2 - 6 Glasses of wine per week     Comment: 4-5 a week.    Drug use: No     Family History   Problem Relation Age of Onset    Cancer Mother 60        breast cancer    Heart Disease Sister         rapid heartbeat    Cancer Brother         colon cancer    Alcohol/Drug Brother         alcoholism    No Known Problems Brother      Argelia  has a past medical history of Adverse effect of anesthesia (1990 & 1993), Anesthesia (10/31/2023), Arrhythmia, Arthritis (10/31/2023), Bowel habit changes (10/31/2023), Bronchitis (10/31/2023), Cancer (HCC) (10/31/2023), Cataract (2017), Chronic bilateral low back pain with bilateral sciatica (10/31/2023), Chronic use of opiate for therapeutic purpose, GERD (gastroesophageal reflux disease), Gynecological disorder (2022), Heart burn (10/31/2023), High cholesterol (2021), History of thyroid cancer, Hyperlipidemia, Hypertension (10/31/2023), Indigestion (10/31/2023), Insomnia, PAC (premature atrial contraction), PONV (postoperative nausea and vomiting) (10/31/2023), PONV (postoperative nausea and vomiting) (02/07/2024), Postsurgical hypothyroidism (10/31/2023), PSVT (paroxysmal supraventricular tachycardia) ([facility]), Snoring (2010?), Urinary bladder disorder  (October 2020), and Urinary incontinence.    She has no past medical history of Acute nasopharyngitis or Infectious disease.   Past Surgical History:   Procedure Laterality Date    CA LAPAROSCOPY, SURG, COLPOPEXY  2/7/2024    Procedure: SACROCOLPOPEXY, ROBOT-ASSISTED, USING DA EMERALD XI;  Surgeon: Antoni Patterson M.D.;  Location: SURGERY [facility];  Service: Gyn Robotic    CA POST COLPORRHAPHY,RECTUM/VAGINA  2/7/2024    Procedure: ANTERIOR REPAIR / POSTERIOR REPAIR / PERINEORRHAPHY;  Surgeon: Antoni Patterson M.D.;  Location: SURGERY [facility];  Service: Gyn Robotic    CA CYSTOURETHROSCOPY  2/7/2024    Procedure: CYSTOURETHROSCOPY;  Surgeon: Antoni Patterson M.D.;  Location: [facility];  Service: Gyn Robotic    ROBOTIC-ASSISTED LAPAROSCOPIC SUPRACERVICAL HYSTERECTOMY  2/7/2024    Procedure: ROBOTIC ASSISTED LAPAROSCOPIC SUPRACERVICAL HYSTERECTOMY, BILATERAL SALPINGO-OOPHORECTOMY;  Surgeon: Antoni Patterson M.D.;  Location: SURGERY [facility];  Service: Gyn Robotic    BLADDER SLING FEMALE  2/7/2024    Procedure: RETROPUBIC MIDURETHERAL SLING;  Surgeon: Antoni Patterson M.D.;  Location: SURGERY [facility];  Service: Gyn Robotic    OTHER Bilateral 10/31/2023    IOLOU    PB LAMINOTOMY,LUMBAR DISK,1 INTRSP N/A 03/03/2021    Procedure: LAMINECTOMY, SPINE, LUMBAR, WITH DISCECTOMY - L2-S1;  Surgeon: Robert Mcdaniel M.D.;  Location: SURGERY [facility];  Service: Neurosurgery    FORAMINOTOMY N/A 03/03/2021    Procedure: FORAMINOTOMY, SPINE;  Surgeon: Robert Mcdaniel M.D.;  Location: SURGERY [facility];  Service: Neurosurgery    OTHER  12/2020    lipoma left arm    COLONOSCOPY  2016    normal    THYROIDECTOMY Right 1993    THYROIDECTOMY Left 1990    OTHER Left 1971    ruptured ovarian cyst/appendectomy    APPENDECTOMY      GYN SURGERY  1971    Ruptured ovarian cyst    OTHER NEUROLOGICAL SURG  March 2021    Laminectomy    TONSILLECTOMY         Screening:  In the last six months have you experienced any leakage of urine? Yes, reports  occasional leakage with use of absorbent pads.    Depression Screening  Little interest or pleasure in doing things?  0 - not at all  Feeling down, depressed , or hopeless? 0 - not at all  Patient Health Questionnaire Score:  0    If depressive symptoms identified deferred to follow up visit unless specifically addressed in assessment and plan.    Interpretation of PHQ-9 Total Score   Score Severity   1-4 No Depression   5-9 Mild Depression   10-14 Moderate Depression   15-19 Moderately Severe Depression   20-27 Severe Depression    Screening for Cognitive Impairment  Do you or any of your friends or family members have any concern about your memory? No  Three Minute Recall (Village, Kitchen, Baby) 3/3    Martín clock face with all 12 numbers and set the hands to show 10 minutes past 11.  Yes    Cognitive concerns identified deferred for follow up unless specifically addressed in assessment and plan.    Fall Risk Assessment  Has the patient had two or more falls in the last year or any fall with injury in the last year?  No    Safety Assessment  Do you always wear your seatbelt?  Yes  Any changes to home needed to function safely? No  Difficulty hearing.  No  Patient counseled about all safety risks that were identified.    Functional Assessment ADLs  Are there any barriers preventing you from cooking for yourself or meeting nutritional needs?  No.    Are there any barriers preventing you from driving safely or obtaining transportation?  No.    Are there any barriers preventing you from using a telephone or calling for help?  No    Are there any barriers preventing you from shopping?  No.    Are there any barriers preventing you from taking care of your own finances?  No    Are there any barriers preventing you from managing your medications?  No    Are there any barriers preventing you from showering, bathing or dressing yourself? No    Are there any barriers preventing you from doing housework or laundry? No    Are  there any barriers preventing you from using the toilet?No    Are you currently engaging in any exercise or physical activity?  Yes. Walks.    Self-Assessment of Health  What is your perception of your health? Fair    Do you sleep more than six hours a night? Yes    In the past 7 days, how much did pain keep you from doing your normal work? None    Do you spend quality time with family or friends (virtually or in person)? Yes    Do you usually eat a heart healthy diet that constists of a variety of fruits, vegetables, whole grains and fiber? Yes Not as much  Do you eat foods high in fat and/or Fast Food more than three times per week? Yes    How concerned are you that your medical conditions are not being well managed? Not at all    Are you worried that in the next 2 months, you may not have stable housing that you own, rent, or stay in as part of a household? No        Advance Care Planning  Do you have an Advance Directive, Living Will, Durable Power of , or POLST? Yes    Living Will            Health Maintenance Summary            Current Care Gaps       COVID-19 Vaccine (4 - 2024-25 season) Overdue since 9/1/2024 08/25/2022  Imm Admin: PFIZER KRIS HAND SARS-COV-2 VACCINATION (12+)    11/08/2021  Imm Admin: Fariha SARS-CoV-2 Vaccine    03/20/2021  Imm Admin: Fariha SARS-CoV-2 Vaccine                      Upcoming       Mammogram (Yearly) Next due on 3/14/2026      03/14/2025  MA-SCREENING MAMMO BILAT W/TOMOSYNTHESIS W/CAD    03/11/2024  MA-SCREENING MAMMO BILAT W/TOMOSYNTHESIS W/CAD    12/23/2022  MA-SCREENING MAMMO BILAT W/TOMOSYNTHESIS W/CAD    11/05/2021  MA-SCREENING MAMMO BILAT W/TOMOSYNTHESIS W/CAD    10/14/2020  MA-DIAGNOSTIC MAMMO LEFT W/TOMOSYNTHESIS W/O CAD     Only the first 5 history entries have been loaded, but more history exists.            Annual Wellness Visit (Yearly) Next due on 4/21/2026 04/21/2025  Level of Service: MO ANNUAL WELLNESS VISIT-INCLUDES PPPS SUBSEQUE*     04/21/2025  Done - Comprehensive Health Assessment    12/15/2023  Level of Service: ANNUAL WELLNESS VISIT-INCLUDES PPPS SUBSEQUE*    08/09/2022  Level of Service: CT ANNUAL WELLNESS VISIT-INCLUDES PPPS SUBSEQUE*    01/22/2020  Visit Dx: Medicare annual wellness visit, subsequent      Only the first 5 history entries have been loaded, but more history exists.              Colorectal Cancer Screening (Colonoscopy - Every 5 Years) Next due on 7/25/2027 07/25/2022  REFERRAL TO GI FOR COLONOSCOPY    07/21/2022  REFERRAL TO GI FOR COLONOSCOPY    10/14/2016  Colonoscopy (Done)              Bone Density Scan (Every 5 Years) Next due on 5/1/2029 05/01/2024  DS-BONE DENSITY STUDY (DEXA)    02/14/2020  DS-BONE DENSITY STUDY (DEXA)              IMM DTaP/Tdap/Td Vaccine (3 - Td or Tdap) Next due on 7/1/2034 07/01/2024  Imm Admin: Tdap Vaccine    07/17/2012  Imm Admin: Tdap Vaccine                      Completed or No Longer Recommended       Hepatitis B Vaccine (Hep B) (Series Information) Completed      07/22/2019  Imm Admin: Hepatitis B Vaccine (Adol/Adult)    12/31/2018  Imm Admin: Hepatitis B Vaccine (Adol/Adult)    11/28/2018  Imm Admin: Hepatitis B Vaccine (Adol/Adult)              Influenza Vaccine (Series Information) Completed      09/24/2024  Imm Admin: Influenza high-dose trivalent (PF)    08/28/2023  Imm Admin: Influenza Vaccine, Quadrivalent, Adjuvanted (Pf)    09/14/2022  Imm Admin: Influenza Vaccine Quad Inj (Pf)    09/27/2021  Imm Admin: Influenza Vaccine Adult HD    10/06/2020  Imm Admin: Influenza Vaccine Adult HD      Only the first 5 history entries have been loaded, but more history exists.              Zoster (Shingles) Vaccines (Series Information) Completed      09/16/2020  Imm Admin: Zoster Vaccine Recombinant (RZV) (SHINGRIX)    01/22/2020  Imm Admin: Zoster Vaccine Recombinant (RZV) (SHINGRIX)    10/03/2011  Imm Admin: Zoster Vaccine Live (ZVL) (Zostavax) - HISTORICAL DATA               Hepatitis C Screening  Completed      09/14/2018  Hepatitis C Antibody component of HEP C VIRUS ANTIBODY              Pneumococcal Vaccine: 50+ Years (Series Information) Completed      11/28/2016  Imm Admin: Pneumococcal polysaccharide vaccine (PPSV-23)    10/22/2015  Imm Admin: Pneumococcal Conjugate Vaccine (Prevnar/PCV-13)              Hepatitis A Vaccine (Hep A) (Series Information) Aged Out      07/22/2019  Imm Admin: Hepatitis A Vaccine, Adult    02/24/2016  Imm Admin: Hepatitis A Vaccine, Adult              HPV Vaccines (Series Information) Aged Out      No completion history exists for this topic.              Polio Vaccine (Inactivated Polio) (Series Information) Aged Out     No completion history exists for this topic.              Meningococcal Immunization (Series Information) Aged Out     No completion history exists for this topic.              Urine Drug Screening  Discontinued        Frequency changed to Never automatically (Topic No Longer Applies)    03/08/2022  Pain Management Screen    09/16/2020  Pain Management Screen    12/13/2018  [facility] PAIN MANAGEMENT SCREEN    11/09/2017  [facility] PAIN MANAGEMENT SCREEN     Only the first 5 history entries have been loaded, but more history exists.                          Patient Care Team:  Lashell Walter P.A.-C. as PCP - General (Physician Assistant)  Lashell Walter P.A.-C. as PCP - H Paneled (Physician Assistant)  Alek Sandoval Ass't as    Antoni Patterson M.D. (OB & Gyn - Gynecology)    Financial Resource Strain: Low Risk  (4/21/2025)    Overall Financial Resource Strain (CARDIA)     Difficulty of Paying Living Expenses: Not hard at all      Transportation Needs: No Transportation Needs (4/21/2025)    PRAPARE - Transportation     Lack of Transportation (Medical): No     Lack of Transportation (Non-Medical): No      Food Insecurity: No Food Insecurity (4/21/2025)    Hunger Vital Sign     Worried About Running Out  "of Food in the Last Year: Never true     Ran Out of Food in the Last Year: Never true        Encounter Vitals  Temperature: 36.4 °C (97.5 °F)  Blood Pressure : 122/76  Pulse: 76  Pulse Oximetry: 95 %  Weight: 69 kg (152 lb 3.2 oz)  Height: 167.6 cm (5' 6\")  BMI (Calculated): 24.57  Pain Score: 2=Minimal-Slight     ROS:  No fever, chills, nausea, vomiting, diarrhea, chest pain or shortness of breath. See HPI.    Physical Exam  Vitals reviewed.   Constitutional:       Appearance: Normal appearance.   Cardiovascular:      Rate and Rhythm: Normal rate and regular rhythm.      Pulses: Normal pulses.      Heart sounds: Normal heart sounds.   Pulmonary:      Effort: Pulmonary effort is normal.      Breath sounds: Normal breath sounds.   Skin:     General: Skin is warm and dry.      Capillary Refill: Capillary refill takes less than 2 seconds.   Neurological:      General: No focal deficit present.      Mental Status: She is alert and oriented to person, place, and time.   Psychiatric:         Mood and Affect: Mood normal.       Assessment and Plan. The following treatment and monitoring plan is recommended:    Dyslipidemia  Chronic, stable. Reviewed lipid profile from January 2025. Currently taking simvastatin 20 mg daily. Provided education on heart healthy diet with adequate intake of fruits, vegetables, and whole grains. Encourage 30 minutes of moderate exercise 3-4 times a week. Denies chest pain and claudication. Routinely monitored by primary care provider.    GERD (gastroesophageal reflux disease)  Chronic, ongoing. Currently taking omeprazole 40 mg 3 times a week. Denies specific dietary triggers. Reports that she elevates the head of her bed if she eats or drinks after 6pm. Denies early satiety, unintentional weight loss, belching, and persistent burning pain in chest or upper abdomen. Followed by primary care provider.    PAF (paroxysmal atrial fibrillation) (HCC)  Secondary hypercoagulable state (HCC)  Chronic, " stable. Reports diagnosis secondary to thyroid disregulation from thyroid cancer. Currently taking metoprolol SR 25 mg daily and rivaroxaban 20 mg daily. Reports intermittent episodes of palpitations and shortness of breath. Routinely monitored by cardiology.     PSVT (paroxysmal supraventricular tachycardia) (HCC)  Chronic, stable. Continues on metoprolol SR 25 mg daily. Reports occasional palpitations. Monitored by cardiology.    Postsurgical hypothyroidism  Chronic, stable. Reports history of partial thyroidectomy in 1990 and 1993 with radioactive iodine therapy. Currently taking levothyroxine 88 mcg daily as prescribed. Denies fatigue, hair and skin changes, temperature intolerance, and weight loss or weight gain. Managed by primary care provider.        Services suggested: No services needed at this time  Health Care Screening: Age-appropriate preventive services recommended by USPTF and ACIP covered by Medicare were discussed today. Services ordered if indicated and agreed upon by the patient.  Referrals offered: Community-based lifestyle interventions to reduce health risks and promote self-management and wellness, fall prevention, nutrition, physical activity, tobacco-use cessation, weight loss, and mental health services as per orders if indicated.    Discussion today about general wellness and lifestyle habits:    Prevent falls and reduce trip hazards; Cautioned about securing or removing rugs.  Have a working fire alarm and carbon monoxide detector.  Engage in regular physical activity and social activities.    Follow-up: Return for appointment with Primary Care Provider as needed.

## 2025-05-06 NOTE — TELEPHONE ENCOUNTER
needs a holter order, LA ordered 48 hour holter, dx: CHRISTIAN Pride, Med Ass't  Steph Black, R.N.     Holter Monitor ordered    no

## 2025-06-09 ENCOUNTER — APPOINTMENT (OUTPATIENT)
Dept: LAB | Facility: MEDICAL CENTER | Age: 75
End: 2025-06-09
Payer: MEDICARE

## 2025-06-09 DIAGNOSIS — R74.8 ELEVATED ALKALINE PHOSPHATASE LEVEL: ICD-10-CM

## 2025-06-09 DIAGNOSIS — D75.89 MACROCYTOSIS: ICD-10-CM

## 2025-06-09 LAB
ALP SERPL-CCNC: 75 U/L (ref 30–99)
GGT SERPL-CCNC: 20 U/L (ref 7–34)
VIT B12 SERPL-MCNC: 1765 PG/ML (ref 211–911)

## 2025-06-09 PROCEDURE — 36415 COLL VENOUS BLD VENIPUNCTURE: CPT

## 2025-06-09 PROCEDURE — 84075 ASSAY ALKALINE PHOSPHATASE: CPT

## 2025-06-09 PROCEDURE — 82746 ASSAY OF FOLIC ACID SERUM: CPT

## 2025-06-09 PROCEDURE — 82977 ASSAY OF GGT: CPT

## 2025-06-09 PROCEDURE — 82607 VITAMIN B-12: CPT

## 2025-06-09 PROCEDURE — 83915 ASSAY OF NUCLEOTIDASE: CPT

## 2025-06-10 LAB — FOLATE SERPL-MCNC: 8.5 NG/ML

## 2025-06-11 ENCOUNTER — RESULTS FOLLOW-UP (OUTPATIENT)
Dept: MEDICAL GROUP | Facility: PHYSICIAN GROUP | Age: 75
End: 2025-06-11

## 2025-06-11 LAB — 5NT SERPL-CCNC: 11 U/L (ref 0–15)

## 2025-06-23 DIAGNOSIS — M54.42 CHRONIC BILATERAL LOW BACK PAIN WITH BILATERAL SCIATICA: ICD-10-CM

## 2025-06-23 DIAGNOSIS — G89.29 CHRONIC BILATERAL LOW BACK PAIN WITH BILATERAL SCIATICA: ICD-10-CM

## 2025-06-23 DIAGNOSIS — M54.41 CHRONIC BILATERAL LOW BACK PAIN WITH BILATERAL SCIATICA: ICD-10-CM

## 2025-06-23 DIAGNOSIS — N89.8 VAGINAL DRYNESS: ICD-10-CM

## 2025-06-23 NOTE — TELEPHONE ENCOUNTER
Received request via: Pharmacy    Was the patient seen in the last year in this department? Yes    Does the patient have an active prescription (recently filled or refills available) for medication(s) requested? No    Pharmacy Name: [facility]    Does the patient have detention Plus and need 100-day supply? (This applies to ALL medications) Yes, quantity updated to 100 days

## 2025-06-23 NOTE — TELEPHONE ENCOUNTER
Received request via: Pharmacy    Was the patient seen in the last year in this department? Yes    Does the patient have an active prescription (recently filled or refills available) for medication(s) requested? No    Pharmacy Name: [facility]    Does the patient have long-term Plus and need 100-day supply? (This applies to ALL medications) Yes, quantity updated to 100 days

## 2025-06-24 RX ORDER — ESTRADIOL 0.1 MG/G
CREAM VAGINAL
Qty: 42.5 G | Refills: 0 | Status: SHIPPED | OUTPATIENT
Start: 2025-06-24

## 2025-06-24 RX ORDER — GABAPENTIN 300 MG/1
300 CAPSULE ORAL 3 TIMES DAILY
Qty: 270 CAPSULE | Refills: 0 | Status: SHIPPED | OUTPATIENT
Start: 2025-06-24

## 2025-06-26 ENCOUNTER — TELEPHONE (OUTPATIENT)
Dept: CARDIOLOGY | Facility: MEDICAL CENTER | Age: 75
End: 2025-06-26
Payer: MEDICARE

## 2025-06-26 NOTE — LETTER
PROCEDURE/SURGERY CLEARANCE FORM    Date: 6/26/2025   Patient Name: Argelia Jimenez    Dear Surgeon or Proceduralist,     The above patient is cleared to have the following procedure/surgery: Lumbar Radiofrequency Ablation     Thank you for your request for cardiac stratification of our mutual patient Argelia Jimenez 1950. We have reviewed their [facility] records; and to the best of our understanding this patient has not had stenting, ablation, watchman, cardiothoracic surgery or hospitalization for cardiovascular reasons in the past 6 months.  Argelia Jimenez has been seen within the past 15 months and is considered to have non-modifiable cardiac risk for this low-risk procedure/surgery. They may proceed from a cardiovascular standpoint and may hold their antiplatelet/anticoagulation as briefly as possible. Please have patient resume this medication when hemodynamically stable to do so.     Aspirin or Prasugrel   - hold 7 days prior to procedure/surgery, resume when hemodynamically stable      Clopidrogrel or Ticagrelor  - hold 7 days for all neurological procedures, hold 5 days prior to all other procedure/surgery,  resume when hemodynamically stable     Warfarin - hold 7 days for all neurological procedures, hold 5 days prior to all other procedure/surgery and coordinate with [facility] ([phone]) INR testing and dose management.      Pradaxa/Xarelto/Eliquis/Savesya - hold 1 day prior to procedure for low bleeding risk procedure, 2 days for high bleeding risk procedure, or consider holding 3 days or longer for patients with reduced kidney function (CrCl <30mL/min) or spinal/cranial surgeries/procedures.      If they have a mechanical heart valve, please coordinate with [facility] ([phone]) the proper management of their anticoagulant in the periprocedural or perioperative period.      Some patients have higher risk for cardiovascular complications  or holding medication. If our patient has had prior complications of holding antiplatelet or anticoagulants in the past and we have seen them after these events, we have addressed these concerns with the patient. They are at an unknown degree of increased risk for recurrent complication.  You may hold anticoagulation/antiplatelets for the procedure or surgery if the benefits of the procedure or surgery outweigh this nonmodifiable risk.      If Argelia Jimenez 1950 has new symptoms of heart failure decompensation, unstable arrythmia, or angina please reach out and we will assess the patient.      If you have other patient-specific concerns, please feel free to reach out to the patient's cardiologist directly at [phone].  Thank you,   [facility]    __ _Electronically signed________  Provider: PHIL Regalado

## 2025-06-27 NOTE — TELEPHONE ENCOUNTER
Last OV: 8/12/24  Proposed Surgery: Lumbar Radiofrequency Ablation  Surgery Date: TBD  Requesting Office Name: Sofia Holbrook  Fax Number: [phone]  Preference of Location (default is surgery center unless specified by Cardiologist or [facility])  Prior Clearance Addressed: No    Is this a general clearance? YES   Anticoags/Antiplatelets: Xarelto  Anticoags/Antiplatelet managed by Cardiology? YES    Outstanding Cardiac Imaging : No  Ablation, Cardioversion, Stent, Cardiac Devices, Catheterization, Watchman: No  TAVR/Valve, Mitral Clip, Watchman (including open heart),: N/A   Recent Cardiac Hospitalization: No            When: N/A  History (cardiac history): Past Medical History[1]        Is this a dental clearance? NO  Ablation, Cardioversion, Watchman, Stents, Cath, Devices within the last 3 months? No   If yes- Send dental wait letter, do not forward to provider for review.     TAVR / Valve, Mitral clip within the last 6 months? No  If yes- Send dental wait letter, do not forward to provider for review.     If completing a general clearance, continue per protocol.           Surgical Clearance Letter Sent: YES   **Scan clearance request letter into [facility].**        [1]   Past Medical History:  Diagnosis Date    Adverse effect of anesthesia 1990 & 1993    Post-op vomitting, & neck/shoulder pain    Anesthesia 10/31/2023    severe PONV, history of motion sickness    Arrhythmia     PAC's,PVC's    Arthritis 10/31/2023    left wrist, right leg/hip    Bowel habit changes 10/31/2023    constipation, medicated    Bronchitis 10/31/2023    history of    Cancer (HCC) 10/31/2023    thyroid at age 40    Cataract 2017    Removed 2017 & 2018    Chronic bilateral low back pain with bilateral sciatica 10/31/2023    at present    Chronic use of opiate for therapeutic purpose     GERD (gastroesophageal reflux disease)     Gynecological disorder 2022    Pelvic Organ Prolapse-this surgery    Heart burn 10/31/2023    medicated     High cholesterol 2021    Now taking Simvastatin    History of thyroid cancer     Hyperlipidemia     Hypertension 10/31/2023    medicated    Indigestion 10/31/2023    medicated    Insomnia     PAC (premature atrial contraction)     PONV (postoperative nausea and vomiting) 10/31/2023    pt has history of motion sickness    PONV (postoperative nausea and vomiting) 02/07/2024    Postsurgical hypothyroidism 10/31/2023    total thyroidectomy, medicated    PSVT (paroxysmal supraventricular tachycardia) (HCC)     Snoring 2010?    As reported by spouse    Urinary bladder disorder October 2020    Recurrent UTI    Urinary incontinence

## 2025-07-14 NOTE — TELEPHONE ENCOUNTER
Received request via: Pharmacy    Was the patient seen in the last year in this department? Yes    Does the patient have an active prescription (recently filled or refills available) for medication(s) requested? No    Pharmacy Name:     Does the patient have [facility] and need 100-day supply? (This applies to ALL medications) Yes, quantity updated to 100 days

## 2025-07-15 RX ORDER — LEVOTHYROXINE SODIUM 88 UG/1
88 TABLET ORAL
Qty: 100 TABLET | Refills: 3 | Status: SHIPPED | OUTPATIENT
Start: 2025-07-15

## 2025-07-16 DIAGNOSIS — E78.5 DYSLIPIDEMIA: ICD-10-CM

## 2025-07-16 DIAGNOSIS — I47.10 PAROXYSMAL SUPRAVENTRICULAR TACHYCARDIA (HCC): ICD-10-CM

## 2025-07-16 DIAGNOSIS — I49.1 PAC (PREMATURE ATRIAL CONTRACTION): ICD-10-CM

## 2025-07-16 DIAGNOSIS — I48.0 PAROXYSMAL ATRIAL FIBRILLATION (HCC): ICD-10-CM

## 2025-07-16 NOTE — TELEPHONE ENCOUNTER
Chart reviewed - last OV 08/12/2024 with RS  Last labs 01/13/2025  Next FV 08/27/2025 with CW   Rx note added to keep appt in August for additional refills      ----------------------------------------------------------  HK (ADD) please advise or approve if appropriate - thank you

## 2025-07-22 ENCOUNTER — TELEPHONE (OUTPATIENT)
Dept: CARDIOLOGY | Facility: MEDICAL CENTER | Age: 75
End: 2025-07-22
Payer: MEDICARE

## 2025-07-22 DIAGNOSIS — E78.5 DYSLIPIDEMIA: ICD-10-CM

## 2025-07-22 DIAGNOSIS — I49.1 PAC (PREMATURE ATRIAL CONTRACTION): ICD-10-CM

## 2025-07-22 DIAGNOSIS — I48.0 PAROXYSMAL ATRIAL FIBRILLATION (HCC): ICD-10-CM

## 2025-07-22 DIAGNOSIS — I47.10 PAROXYSMAL SUPRAVENTRICULAR TACHYCARDIA (HCC): ICD-10-CM

## 2025-07-22 NOTE — TELEPHONE ENCOUNTER
"Pt previously seen by Dr. Esparza and scheduled for FV with CW on 8/27/25.   Noted previous Rx for Xarelto dated 10/24/24 was \"Faxed to [place] [facility] [phone].\"  Noted recent Rx placed by HK as previous ADD on 7/16/25 for Xarelto was sent to [facility] on [address]   Reordered Xarelto per HK under ANI as today's ADD to be sent to [place] [facility] per pt request.  ANI signed paper printed rx.  Faxed printed and signed paper Xarelto rx to desired [place] pharmacy at fax number listed per pt request. Confirmation report and signed Xarelto rx scanned into [employer] [place].       Phone Number Called: [phone]  Call outcome: Spoke to patient regarding message below.  Message: Called to inform patient that we will fax over the prescription for her Xarelto to her preferred [place] pharmacy but first wanted to confirm correct fax number. Pt did confirm [place] [facility] fax number is [phone]. Informed pt that I will fax this over promptly. Advised pt to call back if she has any additional questions/concerns. Pt verbalizes understanding of all discussed above.     "

## 2025-07-22 NOTE — TELEPHONE ENCOUNTER
RS    Caller: Argelia Jimenez     Topic/issue: Patient states she was notified about her   rivaroxaban (XARELTO) 20 MG Tab tablet [[id number]] being available for pickup at the [facility] pharmacy, patient states she has been getting her medication form a pharmacy in [place] as it was the cheapest option available to her. Patient would like to confirm that this is still the case so that she is not charged a large amount for this medication. Please advise.    Callback Number: [phone]     Thank you,  Ramon EDMONDSON

## 2025-08-21 ENCOUNTER — APPOINTMENT (OUTPATIENT)
Dept: MEDICAL GROUP | Facility: PHYSICIAN GROUP | Age: 75
End: 2025-08-21
Payer: MEDICARE

## 2025-08-21 PROBLEM — M54.41 CHRONIC BILATERAL LOW BACK PAIN WITH BILATERAL SCIATICA: Status: ACTIVE | Noted: 2025-08-21

## 2025-08-21 PROBLEM — G89.29 CHRONIC BILATERAL LOW BACK PAIN WITH BILATERAL SCIATICA: Status: ACTIVE | Noted: 2025-08-21

## 2025-08-21 PROBLEM — M54.42 CHRONIC BILATERAL LOW BACK PAIN WITH BILATERAL SCIATICA: Status: ACTIVE | Noted: 2025-08-21

## 2025-08-21 ASSESSMENT — ENCOUNTER SYMPTOMS
CHILLS: 0
HEADACHES: 0
DIZZINESS: 0
SHORTNESS OF BREATH: 0
FEVER: 0

## 2025-08-21 ASSESSMENT — FIBROSIS 4 INDEX: FIB4 SCORE: 1.38

## 2025-08-27 ENCOUNTER — OFFICE VISIT (OUTPATIENT)
Dept: CARDIOLOGY | Facility: MEDICAL CENTER | Age: 75
End: 2025-08-27
Attending: INTERNAL MEDICINE
Payer: MEDICARE

## 2025-08-27 VITALS
RESPIRATION RATE: 14 BRPM | BODY MASS INDEX: 24.27 KG/M2 | DIASTOLIC BLOOD PRESSURE: 74 MMHG | WEIGHT: 151 LBS | OXYGEN SATURATION: 96 % | HEART RATE: 65 BPM | HEIGHT: 66 IN | SYSTOLIC BLOOD PRESSURE: 110 MMHG

## 2025-08-27 DIAGNOSIS — I49.1 PAC (PREMATURE ATRIAL CONTRACTION): Primary | ICD-10-CM

## 2025-08-27 DIAGNOSIS — R93.1 AGATSTON CAC SCORE, <100: ICD-10-CM

## 2025-08-27 DIAGNOSIS — E78.5 DYSLIPIDEMIA: ICD-10-CM

## 2025-08-27 DIAGNOSIS — I47.10 PAROXYSMAL SUPRAVENTRICULAR TACHYCARDIA (HCC): ICD-10-CM

## 2025-08-27 PROBLEM — I48.0 HYPERCOAGULABLE STATE DUE TO PAROXYSMAL ATRIAL FIBRILLATION (HCC): Chronic | Status: ACTIVE | Noted: 2023-04-25

## 2025-08-27 PROBLEM — D68.69 HYPERCOAGULABLE STATE DUE TO PAROXYSMAL ATRIAL FIBRILLATION (HCC): Chronic | Status: ACTIVE | Noted: 2023-04-25

## 2025-08-27 PROCEDURE — 99213 OFFICE O/P EST LOW 20 MIN: CPT | Performed by: INTERNAL MEDICINE

## 2025-08-27 RX ORDER — METOPROLOL SUCCINATE 25 MG/1
25 TABLET, EXTENDED RELEASE ORAL DAILY
Qty: 100 TABLET | Refills: 3 | Status: SHIPPED | OUTPATIENT
Start: 2025-08-27 | End: 2025-08-27 | Stop reason: SDUPTHER

## 2025-08-27 RX ORDER — METOPROLOL SUCCINATE 25 MG/1
25 TABLET, EXTENDED RELEASE ORAL DAILY
Qty: 100 TABLET | Refills: 3 | Status: SHIPPED | OUTPATIENT
Start: 2025-08-27

## 2025-08-27 ASSESSMENT — LIFESTYLE VARIABLES
HOW MANY STANDARD DRINKS CONTAINING ALCOHOL DO YOU HAVE ON A TYPICAL DAY: 1 OR 2
HOW OFTEN DO YOU HAVE SIX OR MORE DRINKS ON ONE OCCASION: NEVER
HOW OFTEN DO YOU HAVE A DRINK CONTAINING ALCOHOL: 2-3 TIMES A WEEK
SKIP TO QUESTIONS 9-10: 1
AUDIT-C TOTAL SCORE: 3

## 2025-08-27 ASSESSMENT — FIBROSIS 4 INDEX: FIB4 SCORE: 1.4

## (undated) DEVICE — GLOVE SZ 6.5 BIOGEL PI MICRO - PF LF (50PR/BX)

## (undated) DEVICE — SYRINGE 10 ML CONTROL LL (25EA/BX 4BX/CA)

## (undated) DEVICE — SHEARS MONOPOLAR CURVED  DA VINCI 10X'S REUSABLE

## (undated) DEVICE — GOWN SURGEONS LARGE - (32/CA)

## (undated) DEVICE — STERI STRIP COMPOUND BENZOIN - TINCTURE 0.6ML WITH APPLICATOR (40EA/BX)

## (undated) DEVICE — PAD LAP STERILE 18 X 18 - (5/PK 40PK/CA)

## (undated) DEVICE — SODIUM CHL IRRIGATION 0.9% 1000ML (12EA/CA)

## (undated) DEVICE — SUCTION INSTRUMENT YANKAUER OPEN TIP W/O VENT (50EA/CA)

## (undated) DEVICE — ELECTRODE DUAL RETURN W/ CORD - (50/PK)

## (undated) DEVICE — KIT EVACUATER 3 SPRING PVC LF 1/8 DRAIN SIZE (10EA/CA)"

## (undated) DEVICE — DRAPE UNDER BUTTOCKS FLUID - (20/CA)

## (undated) DEVICE — CHLORAPREP 26 ML APPLICATOR - ORANGE TINT(25/CA)

## (undated) DEVICE — SUCTION INSTRUMENT YANKAUER BULBOUS TIP W/O VENT (50EA/CA)

## (undated) DEVICE — SUTURE GENERAL

## (undated) DEVICE — SLEEVE, VASO, THIGH, MED

## (undated) DEVICE — ELECTRODE 850 FOAM ADHESIVE - HYDROGEL RADIOTRNSPRNT (50/PK)

## (undated) DEVICE — GLOVE BIOGEL PI ORTHO SZ 7.5 PF LF (40PR/BX)

## (undated) DEVICE — SYRINGE NON SAFETY 10 CC 20 GA X 1-1/2 IN (100/BX, 4BX/CA)

## (undated) DEVICE — GLOVE BIOGEL SZ 6.5 SURGICAL PF LTX (50PR/BX 4BX/CA)

## (undated) DEVICE — DRAPE LAPAROTOMY T SHEET - (12EA/CA)

## (undated) DEVICE — Device

## (undated) DEVICE — SUTURE GOR-TEX CV-2 TH-26 (2NO2A=12PK/BX)  (2NO2B=36PK/BX)

## (undated) DEVICE — TRAY CATHETER FOLEY URINE METER W/STATLOCK 350ML (10EA/CA)

## (undated) DEVICE — SUTURE 3-0 VICRYL PLUS RB-1 - 8 X 18 INCH (12/BX)

## (undated) DEVICE — SET SUCTION/IRRIGATION WITH DISPOSABLE TIP (6/CA )PART #0250-070-520 IS A SUB

## (undated) DEVICE — TRAY SRGPRP PVP IOD WT PRP - (20/CA)

## (undated) DEVICE — SUTURE 2-0 PDS PLUS SH 27 (36EA/BX)"

## (undated) DEVICE — FORCEPS PROGRASP (18UN/EA)

## (undated) DEVICE — GLOVE BIOGEL SZ 8.5 SURGICAL PF LTX - (50PR/BX 4BX/CA)

## (undated) DEVICE — DRAPE COLUMN  BOX OF 20

## (undated) DEVICE — LACTATED RINGERS INJ 1000 ML - (14EA/CA 60CA/PF)

## (undated) DEVICE — SET LEADWIRE 5 LEAD BEDSIDE DISPOSABLE ECG (1SET OF 5/EA)

## (undated) DEVICE — APPLICATOR SURGIFLO - (6EA/CA)

## (undated) DEVICE — NEEDLE NON SAFETY HYPO 22 GA X 1 1/2 IN (100/BX)

## (undated) DEVICE — CANISTER SUCTION 3000ML MECHANICAL FILTER AUTO SHUTOFF MEDI-VAC NONSTERILE LF DISP  (40EA/CA)

## (undated) DEVICE — TOWELS CLOTH SURGICAL - (4/PK 20PK/CA)

## (undated) DEVICE — GLOVE BIOGEL SZ 7 SURGICAL PF LTX - (50PR/BX 4BX/CA)

## (undated) DEVICE — DRAPE STRLE REG TOWEL 18X24 - (10/BX 4BX/CA)"

## (undated) DEVICE — DRAPE MAYO STAND - (30/CA)

## (undated) DEVICE — DRIVER LARGE NEEDLE (15UN/EA)

## (undated) DEVICE — SET EXTENSION WITH 2 PORTS (48EA/CA) ***PART #2C8610 IS A SUBSTITUTE*****

## (undated) DEVICE — BLANKET WARMING LOWER BODY - (10/CA) INACTIVE USE #8585

## (undated) DEVICE — SUTURE 2-0 20CM STRATAFIX SPIRAL SH NEEDLE (12/BX)

## (undated) DEVICE — HEADREST PRONEVIEW LARGE - (10/CA)

## (undated) DEVICE — SUTURE 2-0 STRATAFIX SPIRAL PDS SH (12EA/BX)

## (undated) DEVICE — NEEDLE NON-SAFETY HYPO 21 GA X 1 1/2 IN HYPO (100/BX)

## (undated) DEVICE — ARMREST CRADLE FOAM - (2PR/PK 12PR/CA)

## (undated) DEVICE — SYSTEM CLEARIFY VISUALIZATION (10EA/PK)

## (undated) DEVICE — SUTURE 2-0 VICRYL PLUS CT-1 - 8 X 18 INCH(12/BX)

## (undated) DEVICE — UTERINE MANIP V-CARE STANDARD DAVINCI (8EA/CA)

## (undated) DEVICE — SENSOR SPO2 NEO LNCS ADHESIVE (20/BX) SEE USER NOTES

## (undated) DEVICE — TRAY FOLEY CATHETER STATLOCK 16FR SURESTEP  (10EA/CA)

## (undated) DEVICE — TUBING CLEARLINK DUO-VENT - C-FLO (48EA/CA)

## (undated) DEVICE — GLOVE BIOGEL INDICATOR SZ 7.5 SURGICAL PF LTX - (50PR/BX 4BX/CA)

## (undated) DEVICE — SUTURE 0 VICRYL PLUS UR-6 - 27 INCH (36/BX)

## (undated) DEVICE — MASK ANESTHESIA ADULT  - (100/CA)

## (undated) DEVICE — KIT SURGIFLO W/OUT THROMBIN - (6EA/CA)

## (undated) DEVICE — NEEDLE INSFL 120MM 14GA VRRS - (20/BX)

## (undated) DEVICE — SET EPIDURAL BURRON NON-SAFETY (12EA/CA)

## (undated) DEVICE — LACTATED RINGERS INJ. 500 ML - (24EA/CA)

## (undated) DEVICE — PACK NEURO - (2EA/CA)

## (undated) DEVICE — DRESSING POST OP BORDER 4IN X 12 IN (25EA/CA)

## (undated) DEVICE — COVER LIGHT HANDLE ALC PLUS DISP (18EA/BX)

## (undated) DEVICE — PAD OR TABLE DA VINCI 2IN X 20IN X 72IN - (12EA/CA)

## (undated) DEVICE — TOOL DISSECT MATCH HEAD

## (undated) DEVICE — GLOVE BIOGEL INDICATOR SZ 7SURGICAL PF LTX - (50/BX 4BX/CA)

## (undated) DEVICE — SLEEVE STERILE  A599T - 30/BX 2BX/CS

## (undated) DEVICE — BAG RETRIEVAL 5MM (10EA/BX)

## (undated) DEVICE — RINGDISP RETRACTOR LONESTAR

## (undated) DEVICE — BLANKET WARMING UPPER BODY - (10/CA)

## (undated) DEVICE — SUTURE 2-0 VICRYL PLUS SH - 27 INCH (36/BX)

## (undated) DEVICE — GLOVE BIOGEL ECLIPSE PF LATEX SIZE 7.5

## (undated) DEVICE — LIGHT SOURCE MIS 12FT

## (undated) DEVICE — GLOVE BIOGEL PI INDICATOR SZ 8.5 SURGICAL PF LF - (50PR/BX 4BX/CA)

## (undated) DEVICE — TUBING C&T SET FLYING LEADS DRAIN TUBING (10EA/BX)

## (undated) DEVICE — TIP SACROCOLPOPEXY

## (undated) DEVICE — GOWN WARMING STANDARD FLEX - (30/CA)

## (undated) DEVICE — JELLY SURGILUBE STERILE TUBE 4.25 OZ (1/EA)

## (undated) DEVICE — SYRINGE ASEPTO - (50EA/CA

## (undated) DEVICE — GLOVE BIOGEL SZ 6 PF LATEX - (50EA/BX 4BX/CA)

## (undated) DEVICE — KIT ANESTHESIA W/CIRCUIT & 3/LT BAG W/FILTER (20EA/CA)

## (undated) DEVICE — BLADE SURGICAL #10 - (50/BX)

## (undated) DEVICE — SUTURE 0 VICRYL PLUS CT-2 - 27 INCH (36/BX)

## (undated) DEVICE — FORCEPS MARYLAND BIPOLAR (14UN/EA)

## (undated) DEVICE — SPONGE XRAY 8X4 STERL. 12PL - (10EA/TY 80TY/CA)

## (undated) DEVICE — OBTURATOR BLADELESS STANDARD 8MM (6EA/BX)

## (undated) DEVICE — GLOVE BIOGEL INDICATOR SZ 6.5 SURGICAL PF LTX - (50PR/BX 4BX/CA)

## (undated) DEVICE — TUBE CONNECT SUCTION CLEAR 120 X 1/4" (50EA/CA)"

## (undated) DEVICE — SLEEVE VASO CALF MED - (10PR/CA)

## (undated) DEVICE — PACK TRENGUARD 450 PROCEDURE (12EA/CA)

## (undated) DEVICE — SUTURE 4-0 MONOCRYL PLUS PS-2 - 27 INCH (36/BX)

## (undated) DEVICE — DRIVER LARGE SUTURECUT NEEDLE (15UN/EA)

## (undated) DEVICE — BRIEF STRETCH MATERNITY M/L - FITS 20-60IN (5EA/BG 20BG/CA)

## (undated) DEVICE — CLOSURE SKIN STRIP 1/2 X 4 IN - (STERI STRIP) (50/BX 4BX/CA)

## (undated) DEVICE — DRAPE ARM  BOX OF 20

## (undated) DEVICE — GOWN SURGEONS X-LARGE - DISP. (30/CA)

## (undated) DEVICE — SUTURE 2-0 PROLENE SH (36PK/BX)

## (undated) DEVICE — SUTURE 1 VICRYL PLUS CTX - 8 X 18 INCH (12/BX)

## (undated) DEVICE — GLOVE BIOGEL PI INDICATOR SZ 6.5 SURGICAL PF LF - (50/BX 4BX/CA)

## (undated) DEVICE — TUBE E-T HI-LO CUFF 6.5MM (10EA/BX)

## (undated) DEVICE — COVER TIP ENDOWRIST HOT SHEAR - (10EA/BX) DA VINCI

## (undated) DEVICE — SEALER BIPOLAR 2.3 AQUAMANTYS

## (undated) DEVICE — NEPTUNE 4 PORT MANIFOLD - (20/PK)

## (undated) DEVICE — SENSOR OXIMETER ADULT SPO2 RD SET (20EA/BX)

## (undated) DEVICE — SCRUB SOLUTION EXIDINE 4% 40Z - 48/CS CHLORAHEXADINE GLUCONATE

## (undated) DEVICE — MIDAS LUBRICATOR DIFFUSER PACK (4EA/CA)

## (undated) DEVICE — SEAL 5MM-8MM UNIVERSAL  BOX OF 10

## (undated) DEVICE — NEEDLE SPINAL NON-SAFETY 18 GA X 3 IN (25EA/BX)

## (undated) DEVICE — WATER IRRIG. STER 3000 ML - (4/CA)

## (undated) DEVICE — PROTECTOR ULNA NERVE - (36PR/CA)

## (undated) DEVICE — DERMABOND ADVANCED - (12EA/BX)

## (undated) DEVICE — ADHESIVE DERMABOND HVD MINI (12EA/BX)

## (undated) DEVICE — GLOVE BIOGEL PI INDICATOR SZ 7.0 SURGICAL PF LF - (50/BX 4BX/CA)

## (undated) DEVICE — OBTURATOR BLADELESS LONG 8MM (6EA/BX)

## (undated) DEVICE — PENCIL ELECTSURG 10FT BTN SWH - (50/CA)